# Patient Record
Sex: FEMALE | Race: WHITE | Employment: UNEMPLOYED | URBAN - METROPOLITAN AREA
[De-identification: names, ages, dates, MRNs, and addresses within clinical notes are randomized per-mention and may not be internally consistent; named-entity substitution may affect disease eponyms.]

---

## 2019-12-05 ENCOUNTER — OFFICE VISIT (OUTPATIENT)
Dept: OBGYN CLINIC | Age: 54
End: 2019-12-05
Payer: COMMERCIAL

## 2019-12-05 VITALS
BODY MASS INDEX: 27.28 KG/M2 | WEIGHT: 180 LBS | SYSTOLIC BLOOD PRESSURE: 130 MMHG | DIASTOLIC BLOOD PRESSURE: 76 MMHG | HEIGHT: 68 IN

## 2019-12-05 DIAGNOSIS — N81.89 PELVIC FLOOR WEAKNESS IN FEMALE: ICD-10-CM

## 2019-12-05 DIAGNOSIS — N95.0 PMB (POSTMENOPAUSAL BLEEDING): ICD-10-CM

## 2019-12-05 DIAGNOSIS — R68.82 DECREASED SEX DRIVE: Primary | ICD-10-CM

## 2019-12-05 DIAGNOSIS — N94.10 DYSPAREUNIA, FEMALE: ICD-10-CM

## 2019-12-05 DIAGNOSIS — N95.2 ATROPHIC VAGINITIS: ICD-10-CM

## 2019-12-05 LAB
FOLLICLE STIMULATING HORMONE: 26.6 MIU/ML
GONADOTROPIN, CHORIONIC (HCG) QUANT: <0.1 MIU/ML
LUTEINIZING HORMONE: 37.1 MIU/ML
T4 FREE: 1.19 NG/DL (ref 0.84–1.68)
TSH REFLEX: 6.5 UIU/ML (ref 0.44–3.86)

## 2019-12-05 PROCEDURE — 36415 COLL VENOUS BLD VENIPUNCTURE: CPT | Performed by: OBSTETRICS & GYNECOLOGY

## 2019-12-05 PROCEDURE — 99203 OFFICE O/P NEW LOW 30 MIN: CPT | Performed by: OBSTETRICS & GYNECOLOGY

## 2019-12-05 RX ORDER — CLOTRIMAZOLE 1 %
CREAM (GRAM) TOPICAL
COMMUNITY
Start: 2018-09-19 | End: 2020-11-02

## 2019-12-05 RX ORDER — MAGNESIUM OXIDE 400 MG/1
TABLET ORAL
Refills: 3 | COMMUNITY
Start: 2019-11-25 | End: 2021-04-28 | Stop reason: SDUPTHER

## 2019-12-05 RX ORDER — SPIRONOLACTONE 25 MG/1
25 TABLET ORAL
COMMUNITY
Start: 2019-06-21 | End: 2019-12-10 | Stop reason: ALTCHOICE

## 2019-12-05 RX ORDER — POTASSIUM CHLORIDE 750 MG/1
TABLET, FILM COATED, EXTENDED RELEASE ORAL
COMMUNITY
Start: 2019-12-02 | End: 2019-12-10 | Stop reason: ALTCHOICE

## 2019-12-05 RX ORDER — LEVOTHYROXINE SODIUM 0.03 MG/1
TABLET ORAL
COMMUNITY
Start: 2019-05-29 | End: 2019-12-10 | Stop reason: SDUPTHER

## 2019-12-05 RX ORDER — FOLIC ACID 1 MG/1
TABLET ORAL
COMMUNITY
Start: 2019-12-02 | End: 2020-08-18 | Stop reason: SDUPTHER

## 2019-12-05 RX ORDER — MONTELUKAST SODIUM 10 MG/1
TABLET ORAL
Refills: 3 | COMMUNITY
Start: 2019-11-25 | End: 2020-03-18 | Stop reason: SDUPTHER

## 2019-12-05 RX ORDER — DESLORATADINE 5 MG/1
TABLET ORAL
COMMUNITY
Start: 2019-07-24 | End: 2020-08-18 | Stop reason: SDUPTHER

## 2019-12-05 RX ORDER — ALBUTEROL SULFATE 2.5 MG/3ML
SOLUTION RESPIRATORY (INHALATION)
COMMUNITY
Start: 2014-08-12 | End: 2021-04-28 | Stop reason: SDUPTHER

## 2019-12-05 ASSESSMENT — ENCOUNTER SYMPTOMS
BLOOD IN STOOL: 0
ALLERGIC/IMMUNOLOGIC NEGATIVE: 1
NAUSEA: 0
CONSTIPATION: 0
RESPIRATORY NEGATIVE: 1
ANAL BLEEDING: 0
VOMITING: 0
ABDOMINAL PAIN: 0
DIARRHEA: 0
EYES NEGATIVE: 1
RECTAL PAIN: 0
ABDOMINAL DISTENTION: 0

## 2019-12-09 ENCOUNTER — TELEPHONE (OUTPATIENT)
Dept: OBGYN CLINIC | Age: 54
End: 2019-12-09

## 2019-12-10 ENCOUNTER — OFFICE VISIT (OUTPATIENT)
Dept: PRIMARY CARE CLINIC | Age: 54
End: 2019-12-10
Payer: COMMERCIAL

## 2019-12-10 VITALS
TEMPERATURE: 99.1 F | BODY MASS INDEX: 27.34 KG/M2 | WEIGHT: 180.4 LBS | RESPIRATION RATE: 18 BRPM | DIASTOLIC BLOOD PRESSURE: 76 MMHG | HEART RATE: 83 BPM | OXYGEN SATURATION: 98 % | HEIGHT: 68 IN | SYSTOLIC BLOOD PRESSURE: 120 MMHG

## 2019-12-10 DIAGNOSIS — E87.6 HYPOKALEMIA: ICD-10-CM

## 2019-12-10 DIAGNOSIS — Z00.00 ANNUAL PHYSICAL EXAM: Primary | ICD-10-CM

## 2019-12-10 DIAGNOSIS — Z00.00 ANNUAL PHYSICAL EXAM: ICD-10-CM

## 2019-12-10 DIAGNOSIS — E83.42 HYPOMAGNESEMIA: ICD-10-CM

## 2019-12-10 DIAGNOSIS — J45.30 MILD PERSISTENT ASTHMA WITHOUT COMPLICATION: ICD-10-CM

## 2019-12-10 DIAGNOSIS — I10 ESSENTIAL HYPERTENSION: ICD-10-CM

## 2019-12-10 DIAGNOSIS — Z00.00 HEALTH CARE MAINTENANCE: ICD-10-CM

## 2019-12-10 DIAGNOSIS — E03.9 ACQUIRED HYPOTHYROIDISM: ICD-10-CM

## 2019-12-10 PROBLEM — Z12.11 COLON CANCER SCREENING: Status: ACTIVE | Noted: 2018-08-28

## 2019-12-10 PROBLEM — K57.30 DIVERTICULOSIS OF SIGMOID COLON: Status: ACTIVE | Noted: 2018-09-11

## 2019-12-10 PROBLEM — M25.642 STIFFNESS OF FINGER JOINT, LEFT: Status: ACTIVE | Noted: 2018-09-27

## 2019-12-10 PROBLEM — S63.259A CLOSED DISLOCATION OF FINGER OF LEFT HAND: Status: ACTIVE | Noted: 2018-05-04

## 2019-12-10 LAB
ALBUMIN SERPL-MCNC: 4.6 G/DL (ref 3.5–4.6)
ALP BLD-CCNC: 147 U/L (ref 40–130)
ALT SERPL-CCNC: 17 U/L (ref 0–33)
ANION GAP SERPL CALCULATED.3IONS-SCNC: 20 MEQ/L (ref 9–15)
AST SERPL-CCNC: 41 U/L (ref 0–35)
BASOPHILS ABSOLUTE: 0.1 K/UL (ref 0–0.2)
BASOPHILS RELATIVE PERCENT: 1.3 %
BILIRUB SERPL-MCNC: 0.9 MG/DL (ref 0.2–0.7)
BUN BLDV-MCNC: 12 MG/DL (ref 6–20)
CALCIUM SERPL-MCNC: 9.5 MG/DL (ref 8.5–9.9)
CHLORIDE BLD-SCNC: 93 MEQ/L (ref 95–107)
CHOLESTEROL, TOTAL: 188 MG/DL (ref 0–199)
CO2: 20 MEQ/L (ref 20–31)
CREAT SERPL-MCNC: 0.85 MG/DL (ref 0.5–0.9)
EOSINOPHILS ABSOLUTE: 0.2 K/UL (ref 0–0.7)
EOSINOPHILS RELATIVE PERCENT: 4.5 %
GFR AFRICAN AMERICAN: >60
GFR NON-AFRICAN AMERICAN: >60
GLOBULIN: 3.4 G/DL (ref 2.3–3.5)
GLUCOSE BLD-MCNC: 67 MG/DL (ref 70–99)
HBA1C MFR BLD: 5.2 % (ref 4.8–5.9)
HCT VFR BLD CALC: 41.8 % (ref 37–47)
HDLC SERPL-MCNC: 63 MG/DL (ref 40–59)
HEMOGLOBIN: 14.1 G/DL (ref 12–16)
LDL CHOLESTEROL CALCULATED: 98 MG/DL (ref 0–129)
LYMPHOCYTES ABSOLUTE: 1.3 K/UL (ref 1–4.8)
LYMPHOCYTES RELATIVE PERCENT: 27.3 %
MAGNESIUM: 1.8 MG/DL (ref 1.7–2.4)
MCH RBC QN AUTO: 31.9 PG (ref 27–31.3)
MCHC RBC AUTO-ENTMCNC: 33.8 % (ref 33–37)
MCV RBC AUTO: 94.6 FL (ref 82–100)
MONOCYTES ABSOLUTE: 0.5 K/UL (ref 0.2–0.8)
MONOCYTES RELATIVE PERCENT: 10.3 %
NEUTROPHILS ABSOLUTE: 2.7 K/UL (ref 1.4–6.5)
NEUTROPHILS RELATIVE PERCENT: 56.6 %
PDW BLD-RTO: 15 % (ref 11.5–14.5)
PLATELET # BLD: 205 K/UL (ref 130–400)
POTASSIUM SERPL-SCNC: 4.6 MEQ/L (ref 3.4–4.9)
RBC # BLD: 4.42 M/UL (ref 4.2–5.4)
SODIUM BLD-SCNC: 133 MEQ/L (ref 135–144)
TOTAL PROTEIN: 8 G/DL (ref 6.3–8)
TRIGL SERPL-MCNC: 137 MG/DL (ref 0–150)
WBC # BLD: 4.7 K/UL (ref 4.8–10.8)

## 2019-12-10 PROCEDURE — 99396 PREV VISIT EST AGE 40-64: CPT | Performed by: FAMILY MEDICINE

## 2019-12-10 RX ORDER — FLUOXETINE 10 MG/1
CAPSULE ORAL
COMMUNITY
Start: 2019-12-09 | End: 2021-08-24

## 2019-12-10 RX ORDER — ALBUTEROL SULFATE 90 UG/1
2 AEROSOL, METERED RESPIRATORY (INHALATION) EVERY 4 HOURS PRN
Qty: 1 INHALER | Refills: 1 | Status: SHIPPED | OUTPATIENT
Start: 2019-12-10

## 2019-12-10 RX ORDER — LEVOTHYROXINE SODIUM 0.05 MG/1
50 TABLET ORAL DAILY
Qty: 30 TABLET | Refills: 1 | Status: SHIPPED | OUTPATIENT
Start: 2019-12-10 | End: 2020-01-14 | Stop reason: SDUPTHER

## 2019-12-10 SDOH — ECONOMIC STABILITY: INCOME INSECURITY: HOW HARD IS IT FOR YOU TO PAY FOR THE VERY BASICS LIKE FOOD, HOUSING, MEDICAL CARE, AND HEATING?: NOT HARD AT ALL

## 2019-12-10 SDOH — ECONOMIC STABILITY: FOOD INSECURITY: WITHIN THE PAST 12 MONTHS, THE FOOD YOU BOUGHT JUST DIDN'T LAST AND YOU DIDN'T HAVE MONEY TO GET MORE.: NEVER TRUE

## 2019-12-10 SDOH — ECONOMIC STABILITY: TRANSPORTATION INSECURITY
IN THE PAST 12 MONTHS, HAS THE LACK OF TRANSPORTATION KEPT YOU FROM MEDICAL APPOINTMENTS OR FROM GETTING MEDICATIONS?: NO

## 2019-12-10 SDOH — ECONOMIC STABILITY: TRANSPORTATION INSECURITY
IN THE PAST 12 MONTHS, HAS LACK OF TRANSPORTATION KEPT YOU FROM MEETINGS, WORK, OR FROM GETTING THINGS NEEDED FOR DAILY LIVING?: NO

## 2019-12-10 SDOH — ECONOMIC STABILITY: FOOD INSECURITY: WITHIN THE PAST 12 MONTHS, YOU WORRIED THAT YOUR FOOD WOULD RUN OUT BEFORE YOU GOT MONEY TO BUY MORE.: NEVER TRUE

## 2019-12-10 ASSESSMENT — ENCOUNTER SYMPTOMS
ABDOMINAL PAIN: 0
SHORTNESS OF BREATH: 0
NAUSEA: 0
TROUBLE SWALLOWING: 0
DIARRHEA: 0
BACK PAIN: 0
VOMITING: 0
CONSTIPATION: 0
COUGH: 0
EYE PAIN: 0
CHEST TIGHTNESS: 0

## 2019-12-10 ASSESSMENT — PATIENT HEALTH QUESTIONNAIRE - PHQ9
1. LITTLE INTEREST OR PLEASURE IN DOING THINGS: 0
2. FEELING DOWN, DEPRESSED OR HOPELESS: 0
SUM OF ALL RESPONSES TO PHQ QUESTIONS 1-9: 0
SUM OF ALL RESPONSES TO PHQ9 QUESTIONS 1 & 2: 0
SUM OF ALL RESPONSES TO PHQ QUESTIONS 1-9: 0

## 2019-12-11 ENCOUNTER — HOSPITAL ENCOUNTER (OUTPATIENT)
Dept: ULTRASOUND IMAGING | Age: 54
Discharge: HOME | End: 2019-12-13
Payer: COMMERCIAL

## 2019-12-11 DIAGNOSIS — R79.89 ELEVATED LFTS: Primary | ICD-10-CM

## 2019-12-11 DIAGNOSIS — N95.0 PMB (POSTMENOPAUSAL BLEEDING): ICD-10-CM

## 2019-12-11 PROCEDURE — 76830 TRANSVAGINAL US NON-OB: CPT

## 2019-12-11 PROCEDURE — 76856 US EXAM PELVIC COMPLETE: CPT

## 2019-12-12 ENCOUNTER — TELEPHONE (OUTPATIENT)
Dept: OBGYN CLINIC | Age: 54
End: 2019-12-12

## 2019-12-13 ENCOUNTER — OFFICE VISIT (OUTPATIENT)
Dept: OBGYN CLINIC | Age: 54
End: 2019-12-13
Payer: COMMERCIAL

## 2019-12-13 ENCOUNTER — TELEPHONE (OUTPATIENT)
Dept: OBGYN CLINIC | Age: 54
End: 2019-12-13

## 2019-12-13 VITALS
BODY MASS INDEX: 27.43 KG/M2 | SYSTOLIC BLOOD PRESSURE: 136 MMHG | WEIGHT: 181 LBS | DIASTOLIC BLOOD PRESSURE: 70 MMHG | HEIGHT: 68 IN

## 2019-12-13 DIAGNOSIS — N95.0 PMB (POSTMENOPAUSAL BLEEDING): ICD-10-CM

## 2019-12-13 DIAGNOSIS — N83.8 OVARIAN MASS: Primary | ICD-10-CM

## 2019-12-13 DIAGNOSIS — N83.8 OVARIAN MASS: ICD-10-CM

## 2019-12-13 DIAGNOSIS — N94.89 ADNEXAL MASS: ICD-10-CM

## 2019-12-13 LAB — CA 125: 14.4 U/ML (ref 0–35)

## 2019-12-13 PROCEDURE — 99213 OFFICE O/P EST LOW 20 MIN: CPT | Performed by: OBSTETRICS & GYNECOLOGY

## 2019-12-13 ASSESSMENT — ENCOUNTER SYMPTOMS
ABDOMINAL PAIN: 0
VOMITING: 0
ALLERGIC/IMMUNOLOGIC NEGATIVE: 1
BLOOD IN STOOL: 0
RESPIRATORY NEGATIVE: 1
CONSTIPATION: 0
RECTAL PAIN: 0
ABDOMINAL DISTENTION: 0
EYES NEGATIVE: 1
NAUSEA: 0
ANAL BLEEDING: 0
DIARRHEA: 0

## 2019-12-16 ENCOUNTER — TELEPHONE (OUTPATIENT)
Dept: OBGYN CLINIC | Age: 54
End: 2019-12-16

## 2019-12-16 ENCOUNTER — TELEPHONE (OUTPATIENT)
Dept: PRIMARY CARE CLINIC | Age: 54
End: 2019-12-16

## 2019-12-26 ENCOUNTER — HOSPITAL ENCOUNTER (OUTPATIENT)
Dept: WOMENS IMAGING | Age: 54
Discharge: HOME | End: 2019-12-28
Payer: COMMERCIAL

## 2019-12-26 DIAGNOSIS — Z00.00 HEALTH CARE MAINTENANCE: ICD-10-CM

## 2019-12-26 DIAGNOSIS — Z00.00 ANNUAL PHYSICAL EXAM: ICD-10-CM

## 2019-12-26 PROCEDURE — 77067 SCR MAMMO BI INCL CAD: CPT

## 2019-12-30 ENCOUNTER — TELEPHONE (OUTPATIENT)
Dept: OBGYN CLINIC | Age: 54
End: 2019-12-30

## 2019-12-30 DIAGNOSIS — N95.1 MENOPAUSAL STATE: Primary | ICD-10-CM

## 2020-01-09 PROBLEM — Z12.11 COLON CANCER SCREENING: Status: RESOLVED | Noted: 2018-08-28 | Resolved: 2020-01-09

## 2020-01-14 ENCOUNTER — OFFICE VISIT (OUTPATIENT)
Dept: PRIMARY CARE CLINIC | Age: 55
End: 2020-01-14
Payer: COMMERCIAL

## 2020-01-14 VITALS
WEIGHT: 183 LBS | RESPIRATION RATE: 18 BRPM | BODY MASS INDEX: 27.74 KG/M2 | HEART RATE: 87 BPM | SYSTOLIC BLOOD PRESSURE: 110 MMHG | DIASTOLIC BLOOD PRESSURE: 70 MMHG | HEIGHT: 68 IN | OXYGEN SATURATION: 98 % | TEMPERATURE: 98.2 F

## 2020-01-14 DIAGNOSIS — R79.89 ELEVATED LFTS: ICD-10-CM

## 2020-01-14 DIAGNOSIS — E03.9 ACQUIRED HYPOTHYROIDISM: ICD-10-CM

## 2020-01-14 DIAGNOSIS — N95.1 MENOPAUSAL STATE: ICD-10-CM

## 2020-01-14 PROBLEM — R19.00 PELVIC MASS: Status: ACTIVE | Noted: 2019-12-18

## 2020-01-14 LAB
ALBUMIN SERPL-MCNC: 4.5 G/DL (ref 3.5–4.6)
ALP BLD-CCNC: 175 U/L (ref 40–130)
ALT SERPL-CCNC: 23 U/L (ref 0–33)
ANION GAP SERPL CALCULATED.3IONS-SCNC: 13 MEQ/L (ref 9–15)
AST SERPL-CCNC: 46 U/L (ref 0–35)
BILIRUB SERPL-MCNC: 1.1 MG/DL (ref 0.2–0.7)
BUN BLDV-MCNC: 8 MG/DL (ref 6–20)
CALCIUM SERPL-MCNC: 9.7 MG/DL (ref 8.5–9.9)
CHLORIDE BLD-SCNC: 98 MEQ/L (ref 95–107)
CO2: 23 MEQ/L (ref 20–31)
CREAT SERPL-MCNC: 0.84 MG/DL (ref 0.5–0.9)
FOLLICLE STIMULATING HORMONE: 26.3 MIU/ML
GFR AFRICAN AMERICAN: >60
GFR NON-AFRICAN AMERICAN: >60
GLOBULIN: 3.5 G/DL (ref 2.3–3.5)
GLUCOSE BLD-MCNC: 105 MG/DL (ref 70–99)
LUTEINIZING HORMONE: 62.1 MIU/ML
POTASSIUM SERPL-SCNC: 5.5 MEQ/L (ref 3.4–4.9)
SODIUM BLD-SCNC: 134 MEQ/L (ref 135–144)
T4 FREE: 1.17 NG/DL (ref 0.84–1.68)
TOTAL PROTEIN: 8 G/DL (ref 6.3–8)
TSH SERPL DL<=0.05 MIU/L-ACNC: 7.07 UIU/ML (ref 0.44–3.86)

## 2020-01-14 PROCEDURE — 99213 OFFICE O/P EST LOW 20 MIN: CPT | Performed by: FAMILY MEDICINE

## 2020-01-14 RX ORDER — HYDROCHLOROTHIAZIDE 25 MG/1
25 TABLET ORAL EVERY MORNING
Qty: 90 TABLET | Refills: 3 | Status: SHIPPED | OUTPATIENT
Start: 2020-01-14 | End: 2020-12-23 | Stop reason: SDUPTHER

## 2020-01-14 RX ORDER — SPIRONOLACTONE 25 MG/1
TABLET ORAL
COMMUNITY
Start: 2019-12-14 | End: 2020-01-14

## 2020-01-14 RX ORDER — LEVOTHYROXINE SODIUM 0.07 MG/1
75 TABLET ORAL DAILY
Qty: 30 TABLET | Refills: 1 | Status: SHIPPED | OUTPATIENT
Start: 2020-01-14 | End: 2020-02-27 | Stop reason: SDUPTHER

## 2020-01-14 RX ORDER — MONTELUKAST SODIUM 10 MG/1
10 TABLET ORAL
COMMUNITY
End: 2020-01-14

## 2020-01-14 RX ORDER — LEVOTHYROXINE SODIUM 0.03 MG/1
50 TABLET ORAL
COMMUNITY
End: 2020-01-14

## 2020-01-14 RX ORDER — FLUOXETINE 10 MG/1
10 CAPSULE ORAL
COMMUNITY
Start: 2019-12-09 | End: 2020-01-14

## 2020-01-14 ASSESSMENT — PATIENT HEALTH QUESTIONNAIRE - PHQ9
1. LITTLE INTEREST OR PLEASURE IN DOING THINGS: 0
SUM OF ALL RESPONSES TO PHQ9 QUESTIONS 1 & 2: 0
2. FEELING DOWN, DEPRESSED OR HOPELESS: 0
SUM OF ALL RESPONSES TO PHQ QUESTIONS 1-9: 0
SUM OF ALL RESPONSES TO PHQ QUESTIONS 1-9: 0

## 2020-01-14 ASSESSMENT — ENCOUNTER SYMPTOMS
DIARRHEA: 0
CONSTIPATION: 0
NAUSEA: 0
CHEST TIGHTNESS: 0
SHORTNESS OF BREATH: 0
TROUBLE SWALLOWING: 0
VOMITING: 0
ABDOMINAL PAIN: 0

## 2020-01-14 NOTE — PROGRESS NOTES
Subjective:      Patient ID: Yvan Pineda is a 48 y.o. female who presents today for:  Chief Complaint   Patient presents with    Follow-up     follow for thyroid       HPI     She presents for follow-up on her blood pressure and thyroid. Patient states that she was confused and accidentally kept taking her spironolactone instead of stopping it, as we discussed at her previous visit. She states that she has been feeling a bit better since starting Synthroid. She states that she has noticed some overall retention of water in her arms and legs lately. She and her  adopted 2 baby kittens, and she is extremely excited about them. She denies any other complaints. She is due for repeat thyroid function test today.       Past Medical History:   Diagnosis Date    Allergic rhinitis     Allergies     Asthma     Closed dislocation of finger of left hand 5/4/2018    Essential hypertension 5/15/2018    Hyperthyroidism     Thyroid disease      Past Surgical History:   Procedure Laterality Date    MANDIBLE SURGERY       Social History     Socioeconomic History    Marital status:      Spouse name: Agnes Kemp Number of children: 0    Years of education: 23    Highest education level: Professional school degree (e.g., MD, DDS, DVM, VIVIENNE)   Occupational History    Occupation: retired   Social Needs    Financial resource strain: Not hard at all   Marli-Jesús insecurity:     Worry: Never true     Inability: Never true    Transportation needs:     Medical: No     Non-medical: No   Tobacco Use    Smoking status: Former Smoker    Smokeless tobacco: Never Used   Substance and Sexual Activity    Alcohol use: Yes     Comment: occassionally    Drug use: Not Currently    Sexual activity: Yes     Partners: Male     Comment:  and monogamous   Lifestyle    Physical activity:     Days per week: Not on file     Minutes per session: Not on file    Stress: Not on file   Relationships    Social connections:     Talks on phone: Not on file     Gets together: Not on file     Attends Zoroastrian service: Not on file     Active member of club or organization: Not on file     Attends meetings of clubs or organizations: Not on file     Relationship status: Not on file    Intimate partner violence:     Fear of current or ex partner: Not on file     Emotionally abused: Not on file     Physically abused: Not on file     Forced sexual activity: Not on file   Other Topics Concern    Not on file   Social History Narrative    Not on file     Family History   Problem Relation Age of Onset    Anemia Mother     Asthma Mother     Obesity Mother     Alcohol Abuse Neg Hx     Allergy (Severe) Neg Hx     Arthritis Neg Hx     Arrhythmia Neg Hx     Atrial Fibrillation Neg Hx     Birth Defects Neg Hx     Breast Cancer Neg Hx     Cancer Neg Hx     Coronary Art Dis Neg Hx     Colon Cancer Neg Hx     Depression Neg Hx     Diabetes Neg Hx     Early Death Neg Hx     Hearing Loss Neg Hx     Heart Attack Neg Hx     Heart Disease Neg Hx     High Blood Pressure Neg Hx     High Cholesterol Neg Hx     Kidney Disease Neg Hx     Learning Disabilities Neg Hx     Mental Illness Neg Hx     Mental Retardation Neg Hx     Miscarriages / Stillbirths Neg Hx     Osteoporosis Neg Hx     Prostate Cancer Neg Hx     Stroke Neg Hx     Substance Abuse Neg Hx     Vision Loss Neg Hx      Allergies   Allergen Reactions    Neomycin-Bacitracin-Polymyxin  [Bacitracin-Neomycin-Polymyxin] Rash     Current Outpatient Medications on File Prior to Visit   Medication Sig Dispense Refill    conjugated estrogens (PREMARIN) 0.625 MG/GM vaginal cream Use 0.5 g pv 2 to 3 times weekly.  1 Tube 3    FLUoxetine (PROZAC) 10 MG capsule       albuterol sulfate  (90 Base) MCG/ACT inhaler Inhale 2 puffs into the lungs every 4 hours as needed for Wheezing or Shortness of Breath 1 Inhaler 1    levothyroxine (SYNTHROID) 50 MCG tablet Take 1 tablet by mouth Daily 30 tablet 1    montelukast (SINGULAIR) 10 MG tablet TAKE 1 TABLET BY MOUTH DAILY AT BEDTIME. FOR ASTHMA AND ALLERGIES  3    folic acid (FOLVITE) 1 MG tablet       magnesium oxide (MAG-OX) 400 MG tablet TAKE 1 TABLET BY MOUTH EVERY DAY  3    albuterol (PROVENTIL) (2.5 MG/3ML) 0.083% nebulizer solution Inhale by nebulizer over 5-15 minutes three (3) to four (4) times a day as needed for cough/wheezing/shortness of breath      desloratadine (CLARINEX) 5 MG tablet TAKE 1 TABLET BY MOUTH EVERY DAY      conjugated estrogens (PREMARIN) 0.625 MG/GM vaginal cream Use 0.5 g pv 2 to 3 times weekly.  clotrimazole (LOTRIMIN) 1 % cream Apply topically       No current facility-administered medications on file prior to visit. Review of Systems   Constitutional: Positive for fatigue. Negative for chills, fever and unexpected weight change. HENT: Negative for tinnitus and trouble swallowing. Eyes: Negative for visual disturbance. Respiratory: Negative for chest tightness and shortness of breath. Cardiovascular: Negative for chest pain. Gastrointestinal: Negative for abdominal pain, constipation, diarrhea, nausea and vomiting. Skin: Negative for rash. Neurological: Negative for weakness and headaches. Psychiatric/Behavioral: Negative for suicidal ideas. Objective:   /70 (Site: Left Upper Arm, Position: Sitting, Cuff Size: Medium Adult)   Pulse 87   Temp 98.2 °F (36.8 °C) (Oral)   Resp 18   Ht 5' 8\" (1.727 m)   Wt 183 lb (83 kg)   SpO2 98%   BMI 27.83 kg/m²     Physical Exam  Vitals signs and nursing note reviewed. Constitutional:       Appearance: Normal appearance. She is well-developed. HENT:      Head: Normocephalic and atraumatic. Eyes:      Pupils: Pupils are equal, round, and reactive to light. Neck:      Musculoskeletal: Normal range of motion and neck supple. Cardiovascular:      Rate and Rhythm: Normal rate and regular rhythm. Heart sounds: Normal heart sounds. Pulmonary:      Effort: Pulmonary effort is normal.      Breath sounds: Normal breath sounds. Abdominal:      General: Bowel sounds are normal.      Palpations: Abdomen is soft. Musculoskeletal:      Right lower leg: Edema (Trace) present. Left lower leg: Edema (Trace) present. Skin:     General: Skin is warm and dry. Neurological:      General: No focal deficit present. Mental Status: She is alert and oriented to person, place, and time. Psychiatric:         Mood and Affect: Mood normal.         Behavior: Behavior normal.         Thought Content: Thought content normal.         Judgment: Judgment normal.         No results found for this visit on 01/14/20. Assessment:       Diagnosis Orders   1. Essential hypertension  hydrochlorothiazide (HYDRODIURIL) 25 MG tablet   2. Acquired hypothyroidism  TSH without Reflex    T4, Free         Plan:      Orders Placed This Encounter   Procedures    TSH without Reflex     Standing Status:   Future     Standing Expiration Date:   1/13/2021    T4, Free     Standing Status:   Future     Standing Expiration Date:   1/14/2021     Orders Placed This Encounter   Medications    hydrochlorothiazide (HYDRODIURIL) 25 MG tablet     Sig: Take 1 tablet by mouth every morning     Dispense:  90 tablet     Refill:  3     Spironolactone DC'd. HCTZ started. We will recheck cmp and thyroid function test today. Return in about 10 months (around 11/14/2020) for annual physical.    UpToDate was referenced for current practice guidelines and the current standard of care pertaining specifically to this patient. Please note this report has been partially produced using speech recognition software and may cause contain errors related to that system including grammar, punctuation and spelling as well as words and phrases that may seem inappropriate.  If there are questions or concerns please feel free to contact me to clarify.     Mere Swartz, DO

## 2020-02-03 RX ORDER — LEVOTHYROXINE SODIUM 0.05 MG/1
TABLET ORAL
Qty: 30 TABLET | Refills: 1 | OUTPATIENT
Start: 2020-02-03

## 2020-02-03 NOTE — TELEPHONE ENCOUNTER
Pharmacy  requesting medication refill.  Please approve or deny this request.    Rx requested:  Requested Prescriptions     Pending Prescriptions Disp Refills    levothyroxine (SYNTHROID) 50 MCG tablet [Pharmacy Med Name: LEVOTHYROXINE 50 MCG TABLET] 30 tablet 1     Sig: TAKE 1 TABLET BY MOUTH EVERY DAY         Last Office Visit:   1/14/2020      Next Visit Date:  Future Appointments   Date Time Provider Faith Alejandre   11/17/2020 11:00 AM Mere Klocwork, DO Faith Alejandre

## 2020-02-25 ENCOUNTER — TELEPHONE (OUTPATIENT)
Dept: PRIMARY CARE CLINIC | Age: 55
End: 2020-02-25

## 2020-02-26 ENCOUNTER — OFFICE VISIT (OUTPATIENT)
Dept: PRIMARY CARE CLINIC | Age: 55
End: 2020-02-26
Payer: COMMERCIAL

## 2020-02-26 VITALS
RESPIRATION RATE: 18 BRPM | DIASTOLIC BLOOD PRESSURE: 74 MMHG | HEART RATE: 93 BPM | BODY MASS INDEX: 28.13 KG/M2 | SYSTOLIC BLOOD PRESSURE: 114 MMHG | TEMPERATURE: 98.2 F | OXYGEN SATURATION: 98 % | WEIGHT: 185.6 LBS | HEIGHT: 68 IN

## 2020-02-26 DIAGNOSIS — E03.9 ACQUIRED HYPOTHYROIDISM: ICD-10-CM

## 2020-02-26 LAB
T4 FREE: 1.61 NG/DL (ref 0.84–1.68)
TSH SERPL DL<=0.05 MIU/L-ACNC: 2.91 UIU/ML (ref 0.44–3.86)

## 2020-02-26 PROCEDURE — 99213 OFFICE O/P EST LOW 20 MIN: CPT | Performed by: FAMILY MEDICINE

## 2020-02-26 ASSESSMENT — ENCOUNTER SYMPTOMS
TROUBLE SWALLOWING: 0
SHORTNESS OF BREATH: 0
CHEST TIGHTNESS: 0
NAUSEA: 0
CONSTIPATION: 0
VOMITING: 0
ABDOMINAL PAIN: 0
DIARRHEA: 0

## 2020-02-26 NOTE — PROGRESS NOTES
sulfate  (90 Base) MCG/ACT inhaler Inhale 2 puffs into the lungs every 4 hours as needed for Wheezing or Shortness of Breath 1 Inhaler 1    montelukast (SINGULAIR) 10 MG tablet TAKE 1 TABLET BY MOUTH DAILY AT BEDTIME. FOR ASTHMA AND ALLERGIES  3    folic acid (FOLVITE) 1 MG tablet       magnesium oxide (MAG-OX) 400 MG tablet TAKE 1 TABLET BY MOUTH EVERY DAY  3    albuterol (PROVENTIL) (2.5 MG/3ML) 0.083% nebulizer solution Inhale by nebulizer over 5-15 minutes three (3) to four (4) times a day as needed for cough/wheezing/shortness of breath      desloratadine (CLARINEX) 5 MG tablet TAKE 1 TABLET BY MOUTH EVERY DAY      clotrimazole (LOTRIMIN) 1 % cream Apply topically       No current facility-administered medications on file prior to visit. Review of Systems   Constitutional: Negative for chills, fever and unexpected weight change. HENT: Negative for tinnitus and trouble swallowing. Eyes: Negative for visual disturbance. Respiratory: Negative for chest tightness and shortness of breath. Cardiovascular: Negative for chest pain. Gastrointestinal: Negative for abdominal pain, constipation, diarrhea, nausea and vomiting. Genitourinary: Positive for vaginal bleeding. Negative for decreased urine volume, difficulty urinating, dyspareunia, dysuria, flank pain, frequency, hematuria, pelvic pain, urgency, vaginal discharge and vaginal pain. Skin: Negative for rash. Neurological: Negative for weakness and headaches. Psychiatric/Behavioral: Negative for suicidal ideas. Objective:   /74 (Site: Right Upper Arm, Position: Sitting, Cuff Size: Large Adult)   Pulse 93   Temp 98.2 °F (36.8 °C) (Oral)   Resp 18   Ht 5' 8\" (1.727 m)   Wt 185 lb 9.6 oz (84.2 kg)   SpO2 98%   BMI 28.22 kg/m²     Physical Exam  Vitals signs and nursing note reviewed. Constitutional:       Appearance: Normal appearance. She is well-developed. HENT:      Head: Normocephalic and atraumatic.

## 2020-02-27 RX ORDER — LEVOTHYROXINE SODIUM 0.07 MG/1
75 TABLET ORAL DAILY
Qty: 90 TABLET | Refills: 2 | Status: SHIPPED | OUTPATIENT
Start: 2020-02-27 | End: 2020-11-22 | Stop reason: SDUPTHER

## 2020-03-18 RX ORDER — MONTELUKAST SODIUM 10 MG/1
10 TABLET ORAL NIGHTLY
Qty: 90 TABLET | Refills: 3 | Status: SHIPPED | OUTPATIENT
Start: 2020-03-18 | End: 2021-04-28 | Stop reason: SDUPTHER

## 2020-06-15 ENCOUNTER — TELEPHONE (OUTPATIENT)
Dept: FAMILY MEDICINE CLINIC | Age: 55
End: 2020-06-15

## 2020-06-15 NOTE — TELEPHONE ENCOUNTER
Called patient to scheduled a colonoscopy. Patient stated that she has already had one and it was done with [facility]. Patient stated that will call back to get the fax number to fax the records over.

## 2020-07-27 ENCOUNTER — VIRTUAL VISIT (OUTPATIENT)
Dept: PRIMARY CARE CLINIC | Age: 55
End: 2020-07-27
Payer: COMMERCIAL

## 2020-07-27 PROCEDURE — 99441 PR PHYS/QHP TELEPHONE EVALUATION 5-10 MIN: CPT | Performed by: FAMILY MEDICINE

## 2020-07-27 ASSESSMENT — ENCOUNTER SYMPTOMS
ABDOMINAL PAIN: 0
DIARRHEA: 0
CONSTIPATION: 0
TROUBLE SWALLOWING: 0
VOMITING: 0
SHORTNESS OF BREATH: 0
CHEST TIGHTNESS: 0
NAUSEA: 0

## 2020-07-27 NOTE — PROGRESS NOTES
2020    TELEHEALTH EVALUATION -- Audio/Visual (During MQVDF-48 public health emergency)    Due to [place] 19 outbreak, patient's office visit was converted to a virtual visit. Patient was contacted and agreed to proceed with a virtual visit via Telephone Visit  The risks and benefits of converting to a virtual visit were discussed in light of the current infectious disease epidemic. Patient also understood that insurance coverage and co-pays are up to their individual insurance plans. HPI:    Mimi Duarte (:  1965) has requested an audio evaluation for the following concern(s):    Postop vaginal spotting. Patient underwent D&C at [facility] on 2020. She is having light vaginal spotting that necessitates 1 panty liner per day. She is about 4 weeks postop and is wondering when this will completely resolve. She has reached out to her surgeon, who has reassured the patient that this is normal.      Review of Systems   Constitutional: Negative for chills, fever and unexpected weight change. HENT: Negative for tinnitus and trouble swallowing. Eyes: Negative for visual disturbance. Respiratory: Negative for chest tightness and shortness of breath. Cardiovascular: Negative for chest pain. Gastrointestinal: Negative for abdominal pain, constipation, diarrhea, nausea and vomiting. Genitourinary: Positive for vaginal bleeding. Negative for difficulty urinating, dysuria, frequency, hematuria, pelvic pain, urgency, vaginal discharge and vaginal pain. Skin: Negative for rash. Neurological: Negative for weakness and headaches. Psychiatric/Behavioral: Negative for suicidal ideas. Prior to Visit Medications    Medication Sig Taking?  Authorizing Provider   montelukast (SINGULAIR) 10 MG tablet Take 1 tablet by mouth nightly  Mere Servetas, DO   levothyroxine (SYNTHROID) 75 MCG tablet Take 1 tablet by mouth Daily  Mere Servetas, DO   WIXELA INHUB 100-50 MCG/DOSE 7/27/2020 at 1:21 PM        Pursuant to the emergency declaration under the [facility] waiver authority and the [employer] Act, this Virtual  Visit was conducted, with patient's consent, to reduce the patient's risk of exposure to COVID-19 and provide continuity of care for an established patient. Services were provided through a telephone synchronous discussion virtually to substitute for in-person clinic visit.

## 2020-07-29 ENCOUNTER — TELEPHONE (OUTPATIENT)
Dept: FAMILY MEDICINE CLINIC | Age: 55
End: 2020-07-29

## 2020-07-29 NOTE — TELEPHONE ENCOUNTER
Pt called on the list for  colorectal screening. States that this was done 2 yrs ago at the [facility] not due for 3 more yrs .

## 2020-08-18 RX ORDER — DESLORATADINE 5 MG/1
5 TABLET ORAL DAILY
Qty: 90 TABLET | Refills: 3 | Status: SHIPPED | OUTPATIENT
Start: 2020-08-18 | End: 2021-04-28 | Stop reason: SDUPTHER

## 2020-08-18 RX ORDER — FOLIC ACID 1 MG/1
1 TABLET ORAL DAILY
Qty: 90 TABLET | Refills: 3 | Status: SHIPPED | OUTPATIENT
Start: 2020-08-18 | End: 2021-04-28 | Stop reason: SDUPTHER

## 2020-10-06 ENCOUNTER — PROCEDURE VISIT (OUTPATIENT)
Dept: OBGYN CLINIC | Age: 55
End: 2020-10-06
Payer: COMMERCIAL

## 2020-10-06 VITALS — DIASTOLIC BLOOD PRESSURE: 68 MMHG | WEIGHT: 185 LBS | SYSTOLIC BLOOD PRESSURE: 132 MMHG | BODY MASS INDEX: 28.13 KG/M2

## 2020-10-06 PROCEDURE — 99213 OFFICE O/P EST LOW 20 MIN: CPT | Performed by: OBSTETRICS & GYNECOLOGY

## 2020-10-06 ASSESSMENT — ENCOUNTER SYMPTOMS
DIARRHEA: 0
ALLERGIC/IMMUNOLOGIC NEGATIVE: 1
ANAL BLEEDING: 0
EYES NEGATIVE: 1
NAUSEA: 0
RECTAL PAIN: 0
CONSTIPATION: 0
BLOOD IN STOOL: 0
ABDOMINAL PAIN: 0
ABDOMINAL DISTENTION: 0
RESPIRATORY NEGATIVE: 1
VOMITING: 0

## 2020-10-06 NOTE — PROGRESS NOTES
Patient here to discuss IUD removal.  Patient states she started having \" PMB \"  After no cycle x 1 year. Started spotting daily x 6 weeks. Saw Dr Holli Kaufman [facility] ) and had EMB; negative. Mirena IUD placed to control spotting ( 6/25/2020 ). Bleeding resolved with Mirena. States she has also noticed mood irritability since IUD placed. Occasional NS as well. Discussed above and that menopausal sx more likely related to perimenopause and not IUD. Discussed natural supplements for sx including  Soy, Flaxseed, Black Cohash, Estroven or Vitamin E. Also discussed other meds if necessary including low dose anti-depressants with risks and benefits. All questions answered. After discussion, patient opted to keep IUD in place for now. F/U as directed. Vitals:  /68   Wt 185 lb (83.9 kg)   LMP  (LMP Unknown)   BMI 28.13 kg/m²   Past Medical History:   Diagnosis Date    Allergic rhinitis     Allergies     Asthma     Closed dislocation of finger of left hand 5/4/2018    Essential hypertension 5/15/2018    Hyperthyroidism     Thyroid disease      Past Surgical History:   Procedure Laterality Date    MANDIBLE SURGERY       Allergies:  Neomycin-bacitracin-polymyxin  [bacitracin-neomycin-polymyxin]  Current Outpatient Medications   Medication Sig Dispense Refill    desloratadine (CLARINEX) 5 MG tablet Take 1 tablet by mouth daily 90 tablet 3    folic acid (FOLVITE) 1 MG tablet Take 1 tablet by mouth daily 90 tablet 3    montelukast (SINGULAIR) 10 MG tablet Take 1 tablet by mouth nightly 90 tablet 3    levothyroxine (SYNTHROID) 75 MCG tablet Take 1 tablet by mouth Daily 90 tablet 2    WIXELA INHUB 100-50 MCG/DOSE diskus inhaler INHALE 1 PUFF AS INSTRUCTED TWICE DAILY. RINSE AND GARGLE MOUTH WITH WATER AFTER EACH USE      conjugated estrogens (PREMARIN) 0.625 MG/GM vaginal cream Use 0.5 g pv 2 to 3 times weekly.  1 Tube 3    conjugated estrogens (PREMARIN) 0.625 MG/GM vaginal cream Use 0.5 g pv 2 to 3 times weekly.  hydrochlorothiazide (HYDRODIURIL) 25 MG tablet Take 1 tablet by mouth every morning 90 tablet 3    FLUoxetine (PROZAC) 10 MG capsule       albuterol sulfate  (90 Base) MCG/ACT inhaler Inhale 2 puffs into the lungs every 4 hours as needed for Wheezing or Shortness of Breath 1 Inhaler 1    magnesium oxide (MAG-OX) 400 MG tablet TAKE 1 TABLET BY MOUTH EVERY DAY  3    albuterol (PROVENTIL) (2.5 MG/3ML) 0.083% nebulizer solution Inhale by nebulizer over 5-15 minutes three (3) to four (4) times a day as needed for cough/wheezing/shortness of breath      clotrimazole (LOTRIMIN) 1 % cream Apply topically       No current facility-administered medications for this visit.       Social History     Socioeconomic History    Marital status:      Spouse name: Waldemar Ibarra Number of children: 0    Years of education: 23    Highest education level: Professional school degree (e.g., MD, DDS, DVM, VIVIENNE)   Occupational History    Occupation: retired   Social Needs    Financial resource strain: Not hard at all   DrivenBI insecurity     Worry: Never true     Inability: Never true   Jumptap needs     Medical: No     Non-medical: No   Tobacco Use    Smoking status: Former Smoker    Smokeless tobacco: Never Used   Substance and Sexual Activity    Alcohol use: Yes     Comment: occassionally    Drug use: Not Currently    Sexual activity: Yes     Partners: Male     Comment:  and monogamous   Lifestyle    Physical activity     Days per week: Not on file     Minutes per session: Not on file    Stress: Not on file   Relationships    Social connections     Talks on phone: Not on file     Gets together: Not on file     Attends Hinduism service: Not on file     Active member of club or organization: Not on file     Attends meetings of clubs or organizations: Not on file     Relationship status: Not on file    Intimate partner violence     Fear of current or ex partner: Not on file     Emotionally abused: Not on file     Physically abused: Not on file     Forced sexual activity: Not on file   Other Topics Concern    Not on file   Social History Narrative    Not on file     Family History   Problem Relation Age of Onset    Anemia Mother     Asthma Mother     Obesity Mother     Alcohol Abuse Neg Hx     Allergy (Severe) Neg Hx     Arthritis Neg Hx     Arrhythmia Neg Hx     Atrial Fibrillation Neg Hx     Birth Defects Neg Hx     Breast Cancer Neg Hx     Cancer Neg Hx     Coronary Art Dis Neg Hx     Colon Cancer Neg Hx     Depression Neg Hx     Diabetes Neg Hx     Early Death Neg Hx     Hearing Loss Neg Hx     Heart Attack Neg Hx     Heart Disease Neg Hx     High Blood Pressure Neg Hx     High Cholesterol Neg Hx     Kidney Disease Neg Hx     Learning Disabilities Neg Hx     Mental Illness Neg Hx     Mental Retardation Neg Hx     Miscarriages / Stillbirths Neg Hx     Osteoporosis Neg Hx     Prostate Cancer Neg Hx     Stroke Neg Hx     Substance Abuse Neg Hx     Vision Loss Neg Hx        Review of Systems   Constitutional: Negative. Negative for activity change, appetite change, chills, diaphoresis, fatigue, fever and unexpected weight change. HENT: Negative. Eyes: Negative. Respiratory: Negative. Cardiovascular: Negative. Gastrointestinal: Negative for abdominal distention, abdominal pain, anal bleeding, blood in stool, constipation, diarrhea, nausea, rectal pain and vomiting. Endocrine: Negative. Genitourinary: Negative for decreased urine volume, difficulty urinating, dyspareunia, dysuria, enuresis, flank pain, frequency, genital sores, hematuria, menstrual problem, pelvic pain, urgency, vaginal bleeding, vaginal discharge and vaginal pain. Musculoskeletal: Negative. Skin: Negative. Allergic/Immunologic: Negative. Neurological: Negative. Hematological: Negative. Psychiatric/Behavioral: Positive for dysphoric mood.        [facility] Physical Exam  Constitutional:       General: She is not in acute distress. Appearance: She is well-developed. She is not diaphoretic. HENT:      Head: Normocephalic and atraumatic. Eyes:      Conjunctiva/sclera: Conjunctivae normal.   Neck:      Musculoskeletal: Normal range of motion and neck supple. Cardiovascular:      Rate and Rhythm: Normal rate and regular rhythm. Pulmonary:      Effort: Pulmonary effort is normal. No respiratory distress. Genitourinary:     Vagina: Normal.   Musculoskeletal: Normal range of motion. General: No tenderness or deformity. Skin:     General: Skin is warm and dry. Coloration: Skin is not pale. Neurological:      Mental Status: She is alert and oriented to person, place, and time. Motor: No abnormal muscle tone. Coordination: Coordination normal.   Psychiatric:         Behavior: Behavior normal.         Thought Content: Thought content normal.         Judgment: Judgment normal.         Assessment:      Diagnosis Orders   1. Postmenopausal symptoms           :      Medications placedthis encounter:  No orders of the defined types were placed in this encounter. Orders placedthis encounter:  No orders of the defined types were placed in this encounter.         Follow up:  Return for Annual.

## 2020-11-02 ENCOUNTER — OFFICE VISIT (OUTPATIENT)
Dept: OBGYN CLINIC | Age: 55
End: 2020-11-02
Payer: COMMERCIAL

## 2020-11-02 VITALS — BODY MASS INDEX: 28.28 KG/M2 | DIASTOLIC BLOOD PRESSURE: 82 MMHG | SYSTOLIC BLOOD PRESSURE: 124 MMHG | WEIGHT: 186 LBS

## 2020-11-02 PROCEDURE — 99396 PREV VISIT EST AGE 40-64: CPT | Performed by: OBSTETRICS & GYNECOLOGY

## 2020-11-02 ASSESSMENT — ENCOUNTER SYMPTOMS
BLOOD IN STOOL: 0
CONSTIPATION: 0
ABDOMINAL DISTENTION: 0
VOMITING: 0
RESPIRATORY NEGATIVE: 1
ABDOMINAL PAIN: 0
RECTAL PAIN: 0
DIARRHEA: 0
ANAL BLEEDING: 0
ALLERGIC/IMMUNOLOGIC NEGATIVE: 1
EYES NEGATIVE: 1
NAUSEA: 0

## 2020-11-02 ASSESSMENT — VISUAL ACUITY: OU: 1

## 2020-11-02 NOTE — PROGRESS NOTES
Subjective:      Patient ID: Ellen Heredia. Franki Overton is a 50 y.o. female    Annual exam.  [place] IUD in place ( see previous note ). States that still having some spotting q 2 weeks. May be cycles, but will order US to confirm IUD placement appropriate. No GYN complaints. Pap performed and screening mammogram ordered. Monthly SBE encouraged. Screening colonoscopy recommended per routine. F/U annual exam or prn. Vitals:  /82   Wt 186 lb (84.4 kg)   LMP  (LMP Unknown)   BMI 28.28 kg/m²   Past Medical History:   Diagnosis Date    Allergic rhinitis     Allergies     Asthma     Closed dislocation of finger of left hand 5/4/2018    Essential hypertension 5/15/2018    Hyperthyroidism     Thyroid disease      Past Surgical History:   Procedure Laterality Date    MANDIBLE SURGERY       Allergies:  Neomycin-bacitracin-polymyxin  [bacitracin-neomycin-polymyxin]  Current Outpatient Medications   Medication Sig Dispense Refill    desloratadine (CLARINEX) 5 MG tablet Take 1 tablet by mouth daily 90 tablet 3    folic acid (FOLVITE) 1 MG tablet Take 1 tablet by mouth daily 90 tablet 3    montelukast (SINGULAIR) 10 MG tablet Take 1 tablet by mouth nightly 90 tablet 3    levothyroxine (SYNTHROID) 75 MCG tablet Take 1 tablet by mouth Daily 90 tablet 2    WIXELA INHUB 100-50 MCG/DOSE diskus inhaler INHALE 1 PUFF AS INSTRUCTED TWICE DAILY.  RINSE AND GARGLE MOUTH WITH WATER AFTER EACH USE      hydrochlorothiazide (HYDRODIURIL) 25 MG tablet Take 1 tablet by mouth every morning 90 tablet 3    FLUoxetine (PROZAC) 10 MG capsule       albuterol sulfate  (90 Base) MCG/ACT inhaler Inhale 2 puffs into the lungs every 4 hours as needed for Wheezing or Shortness of Breath 1 Inhaler 1    magnesium oxide (MAG-OX) 400 MG tablet TAKE 1 TABLET BY MOUTH EVERY DAY  3    albuterol (PROVENTIL) (2.5 MG/3ML) 0.083% nebulizer solution Inhale by nebulizer over 5-15 minutes three (3) to four (4) times a day as needed for cough/wheezing/shortness of breath      conjugated estrogens (PREMARIN) 0.625 MG/GM vaginal cream Use 0.5 g pv 2 to 3 times weekly. (Patient not taking: Reported on 11/2/2020) 1 Tube 3     No current facility-administered medications for this visit.       Social History     Socioeconomic History    Marital status:      Spouse name: Hezzie Koyanagi Number of children: 0    Years of education: 23    Highest education level: Professional school degree (e.g., MD, DDS, DVM, VIVIENNE)   Occupational History    Occupation: retired   Social Needs    Financial resource strain: Not hard at all   Tellyo insecurity     Worry: Never true     Inability: Never true   [employer] needs     Medical: No     Non-medical: No   Tobacco Use    Smoking status: Former Smoker    Smokeless tobacco: Never Used   Substance and Sexual Activity    Alcohol use: Yes     Comment: occassionally    Drug use: Not Currently    Sexual activity: Yes     Partners: Male     Comment:  and monogamous   Lifestyle    Physical activity     Days per week: Not on file     Minutes per session: Not on file    Stress: Not on file   Relationships    Social connections     Talks on phone: Not on file     Gets together: Not on file     Attends Taoism service: Not on file     Active member of club or organization: Not on file     Attends meetings of clubs or organizations: Not on file     Relationship status: Not on file    Intimate partner violence     Fear of current or ex partner: Not on file     Emotionally abused: Not on file     Physically abused: Not on file     Forced sexual activity: Not on file   Other Topics Concern    Not on file   Social History Narrative    Not on file     Family History   Problem Relation Age of Onset    Anemia Mother     Asthma Mother     Obesity Mother     Alcohol Abuse Neg Hx     Allergy (Severe) Neg Hx     Arthritis Neg Hx     Arrhythmia Neg Hx     Atrial Fibrillation Neg Hx     Birth Defects Neg Hx  Breast Cancer Neg Hx     Cancer Neg Hx     Coronary Art Dis Neg Hx     Colon Cancer Neg Hx     Depression Neg Hx     Diabetes Neg Hx     Early Death Neg Hx     Hearing Loss Neg Hx     Heart Attack Neg Hx     Heart Disease Neg Hx     High Blood Pressure Neg Hx     High Cholesterol Neg Hx     Kidney Disease Neg Hx     Learning Disabilities Neg Hx     Mental Illness Neg Hx     Mental Retardation Neg Hx     Miscarriages / Stillbirths Neg Hx     Osteoporosis Neg Hx     Prostate Cancer Neg Hx     Stroke Neg Hx     Substance Abuse Neg Hx     Vision Loss Neg Hx        Review of Systems   Constitutional: Negative. Negative for activity change, appetite change, chills, diaphoresis, fatigue, fever and unexpected weight change. HENT: Negative. Eyes: Negative. Respiratory: Negative. Cardiovascular: Negative. Gastrointestinal: Negative for abdominal distention, abdominal pain, anal bleeding, blood in stool, constipation, diarrhea, nausea, rectal pain and vomiting. Endocrine: Negative. Genitourinary: Negative for decreased urine volume, difficulty urinating, dyspareunia, dysuria, enuresis, flank pain, frequency, genital sores, hematuria, menstrual problem, pelvic pain, urgency, vaginal bleeding, vaginal discharge and vaginal pain. Musculoskeletal: Negative. Skin: Negative. Allergic/Immunologic: Negative. Neurological: Negative. Hematological: Negative. Psychiatric/Behavioral: Negative. Objective:     Physical Exam  Constitutional:       Appearance: She is well-developed. HENT:      Head: Normocephalic. Eyes:      General: Lids are normal. Vision grossly intact. Neck:      Musculoskeletal: Normal range of motion and neck supple. Thyroid: No thyromegaly. Cardiovascular:      Rate and Rhythm: Normal rate and regular rhythm. Heart sounds: Normal heart sounds. Pulmonary:      Effort: Pulmonary effort is normal. No respiratory distress.       Breath sounds: Normal breath sounds. No wheezing or rales. Chest:      Chest wall: No tenderness. Breasts:         Right: Normal. No swelling, bleeding, inverted nipple, mass, nipple discharge, skin change or tenderness. Left: Normal. No swelling, bleeding, inverted nipple, mass, nipple discharge, skin change or tenderness. Abdominal:      General: There is no distension. Palpations: Abdomen is soft. There is no mass. Tenderness: There is no abdominal tenderness. There is no guarding or rebound. Hernia: No hernia is present. There is no hernia in the left inguinal area or right inguinal area. Genitourinary:     General: Normal vulva. Pubic Area: No rash. Labia:         Right: No rash, tenderness, lesion or injury. Left: No rash, tenderness, lesion or injury. Urethra: No prolapse, urethral swelling or urethral lesion. Vagina: Normal. No signs of injury and foreign body. No vaginal discharge, erythema, tenderness or bleeding. Cervix: No cervical motion tenderness, discharge or friability. Uterus: Not deviated, not enlarged, not fixed and not tender. Adnexa:         Right: No mass, tenderness or fullness. Left: No mass, tenderness or fullness. Rectum: Normal.   Musculoskeletal: Normal range of motion. General: No tenderness. Lymphadenopathy:      Cervical: No cervical adenopathy. Upper Body:      Right upper body: No supraclavicular or axillary adenopathy. Left upper body: No supraclavicular or axillary adenopathy. Lower Body: No right inguinal adenopathy. No left inguinal adenopathy. Skin:     General: Skin is warm and dry. Coloration: Skin is not pale. Findings: No erythema or rash. Neurological:      Mental Status: She is alert and oriented to person, place, and time. Psychiatric:         Behavior: Behavior normal.         Thought Content:  Thought content normal.         Judgment: Judgment normal.         Assessment:       Diagnosis Orders   1. Pap smear, as part of routine gynecological examination  PAP SMEAR   2. Screening for human papillomavirus  PAP SMEAR   3. Screening mammogram, encounter for  BIN DIGITAL SCREEN W OR WO CAD BILATERAL   4. Spotting          Plan:      Medications placedthis encounter:  No orders of the defined types were placed in this encounter. Orders placedthis encounter:  Orders Placed This Encounter   Procedures    BIN DIGITAL SCREEN W OR WO CAD BILATERAL     Standing Status:   Future     Standing Expiration Date:   11/2/2021    PAP SMEAR     Patient History:    No LMP recorded (lmp unknown). Patient is postmenopausal.  OBGYN Status: Postmenopausal  Past Surgical History:  No date: MANDIBLE SURGERY      Social History    Tobacco Use      Smoking status: Former Smoker      Smokeless tobacco: Never Used       Standing Status:   Future     Standing Expiration Date:   11/2/2021     Order Specific Question:   Collection Type     Answer: Thin Prep     Order Specific Question:   Prior Abnormal Pap Test     Answer:   No     Order Specific Question:   Screening or Diagnostic     Answer:   Screening     Order Specific Question:   HPV Requested? Answer:   Yes     Comments:   16/18     Order Specific Question:   High Risk Patient     Answer:   N/A         Follow up:  Return in about 1 year (around 11/2/2021) for Annual, Ultrasound, Results Review. Repeat Annual GYN exam every 1 year. Cervical Cytology exam starts age 24. If Negative Cytology;  follow-up screening per current guidelines. Mammograms yearly starting at age 36. Calcium and Vitamin D dosing reviewed ( age appropriate ). Colonoscopy and bone density screening discussed ( age appropriate ). Birth control and STD prevention discussed ( age appropriate ). Gardisil counseling completed for all patients 7-35 yo. Routine health maintenance ( per PCP and guidelines ).

## 2020-11-04 ENCOUNTER — HOSPITAL ENCOUNTER (OUTPATIENT)
Dept: ULTRASOUND IMAGING | Age: 55
Discharge: HOME | End: 2020-11-06
Payer: COMMERCIAL

## 2020-11-04 PROCEDURE — 76830 TRANSVAGINAL US NON-OB: CPT

## 2020-11-04 PROCEDURE — 76856 US EXAM PELVIC COMPLETE: CPT

## 2020-11-05 ENCOUNTER — OFFICE VISIT (OUTPATIENT)
Dept: OBGYN CLINIC | Age: 55
End: 2020-11-05
Payer: COMMERCIAL

## 2020-11-05 VITALS
SYSTOLIC BLOOD PRESSURE: 142 MMHG | WEIGHT: 186 LBS | DIASTOLIC BLOOD PRESSURE: 76 MMHG | BODY MASS INDEX: 28.19 KG/M2 | HEIGHT: 68 IN

## 2020-11-05 PROCEDURE — 99214 OFFICE O/P EST MOD 30 MIN: CPT | Performed by: OBSTETRICS & GYNECOLOGY

## 2020-11-05 ASSESSMENT — ENCOUNTER SYMPTOMS
DIARRHEA: 0
ALLERGIC/IMMUNOLOGIC NEGATIVE: 1
ANAL BLEEDING: 0
RESPIRATORY NEGATIVE: 1
RECTAL PAIN: 0
VOMITING: 0
CONSTIPATION: 0
ABDOMINAL PAIN: 0
EYES NEGATIVE: 1
BLOOD IN STOOL: 0
NAUSEA: 0
ABDOMINAL DISTENTION: 0

## 2020-11-05 NOTE — PROGRESS NOTES
Patient here for US results ( see previous note ). H/O right dermoid since last year. Saw Dr Angel Reyes ( GYN/ONC ) after finding of 11 cm solid/cystic right adnexal mass last year on exam.  Rads report did not separate size of ovary from cystic component on that [address].  negative. Dr Angel Reyes did a CT and counseled patient that this was statistically a benign dermoid and recommended conservative observation based on co-morbidities for surgery. Patient had repeat US 6/2020 and ovarian size smaller with cystic component being 5 cm. Also had IUD placed at that time for spotting after EMB negative. US just performed:         EXAMINATION: US NON OB TRANSVAGINAL, US PELVIS COMPLETE         CLINICAL HISTORY: SPOTTING, HISTORY OF CYSTIC OVARIAN LESION. Z30.431 IUD check up ICD10         COMPARISONS: PELVIC ULTRASOUND FROM DECEMBER 11, 2019         TECHNIQUE: Transabdominal and endovaginal multiplanar grayscale and Doppler ultrasound images of the pelvis were obtained.         FINDINGS:      The uterus measures 7.1 x 3.2 x 3.8 cm. The endometrial stripe measures 3 mm and there is an endometrial IUD device in place.         There is no free fluid.         The right ovary measures 8.3 x 5.7 x 5.6 cm for an estimated volume of 140 mL. There is a large cyst arising from the right ovary with a diameter of 6.2 cm. There is an eccentric nodule within the cyst which measures 1.7 cm in greatest diameter.         The left ovary measures 2.2 x 1.3 x 1.7 cm for an estimated volume of 2.4 mL. There are no focal solid or cystic lesions.               Impression         1. There is an IUD within the endometrial cavity. The uterus is normal in size and sonographic appearance. 2. Left ovary is within normal limits. 3. There is a large, greater than 6 cm cystic mass arising from the right ovary in containing a mural nodule.  The findings are similar to the prior study from 11 months prior however the differential includes a malignant cystic ovarian tumor versus    endometrioma versus other etiologies. Pelvic MRI correlation may be considered.                Discussed US as above with smaller ovarian size and overall stability of dermoid at 6 cm. Discussed options again including conservative management with repeat  and US 6 months vs surgical removal.  Risks and benefits discussed. Patient has opted to proceed with conservative management for now. Cyst is currently asx. Precautions discussed. Pt was seen with total face to face time of 25 minutes with more than 50% of the visit being counseling and education regarding encounter dx of ovarian cyst.  See discussion /counseling details as stated above. Vitals:  BP (!) 142/76   Ht 5' 8\" (1.727 m)   Wt 186 lb (84.4 kg)   LMP  (LMP Unknown)   BMI 28.28 kg/m²   Past Medical History:   Diagnosis Date    Allergic rhinitis     Allergies     Asthma     Closed dislocation of finger of left hand 5/4/2018    Dermoid tumor     Essential hypertension 5/15/2018    Hyperthyroidism     Thyroid disease      Past Surgical History:   Procedure Laterality Date    MANDIBLE SURGERY       Allergies:  Neomycin-bacitracin-polymyxin  [bacitracin-neomycin-polymyxin]  Current Outpatient Medications   Medication Sig Dispense Refill    desloratadine (CLARINEX) 5 MG tablet Take 1 tablet by mouth daily 90 tablet 3    folic acid (FOLVITE) 1 MG tablet Take 1 tablet by mouth daily 90 tablet 3    montelukast (SINGULAIR) 10 MG tablet Take 1 tablet by mouth nightly 90 tablet 3    levothyroxine (SYNTHROID) 75 MCG tablet Take 1 tablet by mouth Daily 90 tablet 2    WIXELA INHUB 100-50 MCG/DOSE diskus inhaler INHALE 1 PUFF AS INSTRUCTED TWICE DAILY. RINSE AND GARGLE MOUTH WITH WATER AFTER EACH USE      conjugated estrogens (PREMARIN) 0.625 MG/GM vaginal cream Use 0.5 g pv 2 to 3 times weekly.  (Patient not taking: Reported on 11/2/2020) 1 Tube 3    hydrochlorothiazide (HYDRODIURIL) 25 MG tablet Take 1 Not on file   Social History Narrative    Not on file        Family History   Problem Relation Age of Onset    Anemia Mother     Asthma Mother     Obesity Mother     Alcohol Abuse Neg Hx     Allergy (Severe) Neg Hx     Arthritis Neg Hx     Arrhythmia Neg Hx     Atrial Fibrillation Neg Hx     Birth Defects Neg Hx     Breast Cancer Neg Hx     Cancer Neg Hx     Coronary Art Dis Neg Hx     Colon Cancer Neg Hx     Depression Neg Hx     Diabetes Neg Hx     Early Death Neg Hx     Hearing Loss Neg Hx     Heart Attack Neg Hx     Heart Disease Neg Hx     High Blood Pressure Neg Hx     High Cholesterol Neg Hx     Kidney Disease Neg Hx     Learning Disabilities Neg Hx     Mental Illness Neg Hx     Mental Retardation Neg Hx     Miscarriages / Stillbirths Neg Hx     Osteoporosis Neg Hx     Prostate Cancer Neg Hx     Stroke Neg Hx     Substance Abuse Neg Hx     Vision Loss Neg Hx        Review of Systems   Constitutional: Negative. Negative for activity change, appetite change, chills, diaphoresis, fatigue, fever and unexpected weight change. HENT: Negative. Eyes: Negative. Respiratory: Negative. Cardiovascular: Negative. Gastrointestinal: Negative for abdominal distention, abdominal pain, anal bleeding, blood in stool, constipation, diarrhea, nausea, rectal pain and vomiting. Endocrine: Negative. Genitourinary: Negative for decreased urine volume, difficulty urinating, dyspareunia, dysuria, enuresis, flank pain, frequency, genital sores, hematuria, menstrual problem, pelvic pain, urgency, vaginal bleeding, vaginal discharge and vaginal pain. Musculoskeletal: Negative. Skin: Negative. Allergic/Immunologic: Negative. Neurological: Negative. Hematological: Negative. Psychiatric/Behavioral: Negative. Objective:     Physical Exam  Constitutional:       General: She is not in acute distress. Appearance: She is well-developed. She is not diaphoretic.    HENT: Head: Normocephalic and atraumatic. Eyes:      Conjunctiva/sclera: Conjunctivae normal.   Neck:      Musculoskeletal: Normal range of motion and neck supple. Cardiovascular:      Rate and Rhythm: Normal rate and regular rhythm. Pulmonary:      Effort: Pulmonary effort is normal. No respiratory distress. Musculoskeletal: Normal range of motion. General: No tenderness or deformity. Skin:     General: Skin is warm and dry. Coloration: Skin is not pale. Neurological:      Mental Status: She is alert and oriented to person, place, and time. Motor: No abnormal muscle tone. Coordination: Coordination normal.   Psychiatric:         Behavior: Behavior normal.         Thought Content: Thought content normal.         Judgment: Judgment normal.         Assessment:          Diagnosis Orders   1. Follow up     2. IUD check up     3. Adnexal mass  Ca 125    US PELVIS COMPLETE    US NON OB TRANSVAGINAL        Plan:      Medications placedthis encounter:  No orders of the defined types were placed in this encounter. Orders placedthis encounter:  Orders Placed This Encounter   Procedures    US PELVIS COMPLETE     Standing Status:   Future     Standing Expiration Date:   11/5/2021    US NON OB TRANSVAGINAL     Standing Status:   Future     Standing Expiration Date:   11/5/2021    Ca 125     Standing Status:   Future     Standing Expiration Date:   11/5/2021         Follow up:  Return in about 6 months (around 5/5/2021) for Ultrasound, Results Review.

## 2020-11-06 ENCOUNTER — NURSE ONLY (OUTPATIENT)
Dept: OBGYN CLINIC | Age: 55
End: 2020-11-06

## 2020-11-06 DIAGNOSIS — N94.89 ADNEXAL MASS: ICD-10-CM

## 2020-11-06 LAB — CA 125: 14 U/ML (ref 0–35)

## 2020-11-10 NOTE — LETTER
[address] [place] [address],[facility] [address] [place] [address]  Phone: [phone]  Fax: [phone]    Juan Miguel Pan MD        November 11, 2020    [place] [address] [id number]      Dear Kasie:    The results of your most recent Pap smear are normal. This means that no cancerous or precancerous cells were seen. We recommend that you come back in 1 year for your next routine Pap smear. If you have any questions or concerns, please don't hesitate to call.     Sincerely,        Juan Miguel Pan MD

## 2020-11-22 RX ORDER — LEVOTHYROXINE SODIUM 0.07 MG/1
75 TABLET ORAL DAILY
Qty: 90 TABLET | Refills: 2 | Status: SHIPPED | OUTPATIENT
Start: 2020-11-22 | End: 2021-04-28 | Stop reason: SDUPTHER

## 2020-11-23 ENCOUNTER — OFFICE VISIT (OUTPATIENT)
Dept: PRIMARY CARE CLINIC | Age: 55
End: 2020-11-23
Payer: COMMERCIAL

## 2020-11-23 VITALS
HEIGHT: 66 IN | WEIGHT: 186.8 LBS | HEART RATE: 89 BPM | OXYGEN SATURATION: 98 % | BODY MASS INDEX: 30.02 KG/M2 | DIASTOLIC BLOOD PRESSURE: 80 MMHG | SYSTOLIC BLOOD PRESSURE: 134 MMHG | RESPIRATION RATE: 16 BRPM

## 2020-11-23 DIAGNOSIS — E87.5 HYPERKALEMIA: ICD-10-CM

## 2020-11-23 DIAGNOSIS — Z00.00 PREVENTATIVE HEALTH CARE: ICD-10-CM

## 2020-11-23 LAB
ALBUMIN SERPL-MCNC: 4.5 G/DL (ref 3.5–4.6)
ALP BLD-CCNC: 171 U/L (ref 40–130)
ALT SERPL-CCNC: 39 U/L (ref 0–33)
ANION GAP SERPL CALCULATED.3IONS-SCNC: 15 MEQ/L (ref 9–15)
AST SERPL-CCNC: 78 U/L (ref 0–35)
BILIRUB SERPL-MCNC: 1 MG/DL (ref 0.2–0.7)
BUN BLDV-MCNC: 9 MG/DL (ref 6–20)
CALCIUM SERPL-MCNC: 9.5 MG/DL (ref 8.5–9.9)
CHLORIDE BLD-SCNC: 88 MEQ/L (ref 95–107)
CO2: 27 MEQ/L (ref 20–31)
CREAT SERPL-MCNC: 0.77 MG/DL (ref 0.5–0.9)
GFR AFRICAN AMERICAN: >60
GFR NON-AFRICAN AMERICAN: >60
GLOBULIN: 3.4 G/DL (ref 2.3–3.5)
GLUCOSE BLD-MCNC: 109 MG/DL (ref 70–99)
POTASSIUM SERPL-SCNC: 3.5 MEQ/L (ref 3.4–4.9)
SODIUM BLD-SCNC: 130 MEQ/L (ref 135–144)
TOTAL PROTEIN: 7.9 G/DL (ref 6.3–8)

## 2020-11-23 PROCEDURE — 99214 OFFICE O/P EST MOD 30 MIN: CPT | Performed by: INTERNAL MEDICINE

## 2020-11-23 ASSESSMENT — PATIENT HEALTH QUESTIONNAIRE - PHQ9
SUM OF ALL RESPONSES TO PHQ QUESTIONS 1-9: 0
1. LITTLE INTEREST OR PLEASURE IN DOING THINGS: 0
2. FEELING DOWN, DEPRESSED OR HOPELESS: 0
SUM OF ALL RESPONSES TO PHQ QUESTIONS 1-9: 0
SUM OF ALL RESPONSES TO PHQ9 QUESTIONS 1 & 2: 0
SUM OF ALL RESPONSES TO PHQ QUESTIONS 1-9: 0

## 2020-11-23 ASSESSMENT — ENCOUNTER SYMPTOMS
ABDOMINAL PAIN: 0
NAUSEA: 0
COUGH: 0
DIARRHEA: 0
COLOR CHANGE: 1
SHORTNESS OF BREATH: 0
VOMITING: 0
WHEEZING: 0

## 2020-11-23 NOTE — PROGRESS NOTES
abcess to the back of right knee phone: Not on file     Gets together: Not on file     Attends Latter-day service: Not on file     Active member of club or organization: Not on file     Attends meetings of clubs or organizations: Not on file     Relationship status: Not on file    Intimate partner violence     Fear of current or ex partner: Not on file     Emotionally abused: Not on file     Physically abused: Not on file     Forced sexual activity: Not on file   Other Topics Concern    Not on file   Social History Narrative    Not on file     Family History   Problem Relation Age of Onset    Anemia Mother     Asthma Mother     Obesity Mother     Alcohol Abuse Neg Hx     Allergy (Severe) Neg Hx     Arthritis Neg Hx     Arrhythmia Neg Hx     Atrial Fibrillation Neg Hx     Birth Defects Neg Hx     Breast Cancer Neg Hx     Cancer Neg Hx     Coronary Art Dis Neg Hx     Colon Cancer Neg Hx     Depression Neg Hx     Diabetes Neg Hx     Early Death Neg Hx     Hearing Loss Neg Hx     Heart Attack Neg Hx     Heart Disease Neg Hx     High Blood Pressure Neg Hx     High Cholesterol Neg Hx     Kidney Disease Neg Hx     Learning Disabilities Neg Hx     Mental Illness Neg Hx     Mental Retardation Neg Hx     Miscarriages / Stillbirths Neg Hx     Osteoporosis Neg Hx     Prostate Cancer Neg Hx     Stroke Neg Hx     Substance Abuse Neg Hx     Vision Loss Neg Hx      Allergies:  Neomycin-bacitracin-polymyxin  [bacitracin-neomycin-polymyxin]  Patient Active Problem List   Diagnosis    Acquired hypothyroidism    Allergic conjunctivitis of both eyes    Closed dislocation of finger of left hand    Diverticulosis of sigmoid colon    Essential hypertension    Mild persistent asthma without complication    Myopia of both eyes    Seasonal and perennial allergic rhinitis    Stiffness of finger joint, left    Pelvic mass     Current Outpatient Medications on File Prior to Visit   Medication Sig Dispense Refill    levothyroxine (SYNTHROID) 75 MCG tablet Take 1 tablet by mouth Daily 90 tablet 2    desloratadine (CLARINEX) 5 MG tablet Take 1 tablet by mouth daily 90 tablet 3    folic acid (FOLVITE) 1 MG tablet Take 1 tablet by mouth daily 90 tablet 3    montelukast (SINGULAIR) 10 MG tablet Take 1 tablet by mouth nightly 90 tablet 3    WIXELA INHUB 100-50 MCG/DOSE diskus inhaler INHALE 1 PUFF AS INSTRUCTED TWICE DAILY. RINSE AND GARGLE MOUTH WITH WATER AFTER EACH USE      conjugated estrogens (PREMARIN) 0.625 MG/GM vaginal cream Use 0.5 g pv 2 to 3 times weekly. 1 Tube 3    hydrochlorothiazide (HYDRODIURIL) 25 MG tablet Take 1 tablet by mouth every morning 90 tablet 3    FLUoxetine (PROZAC) 10 MG capsule       albuterol sulfate  (90 Base) MCG/ACT inhaler Inhale 2 puffs into the lungs every 4 hours as needed for Wheezing or Shortness of Breath 1 Inhaler 1    magnesium oxide (MAG-OX) 400 MG tablet TAKE 1 TABLET BY MOUTH EVERY DAY  3    albuterol (PROVENTIL) (2.5 MG/3ML) 0.083% nebulizer solution Inhale by nebulizer over 5-15 minutes three (3) to four (4) times a day as needed for cough/wheezing/shortness of breath       No current facility-administered medications on file prior to visit. Review of Systems   Constitutional: Negative for chills, diaphoresis, fatigue and fever. HENT: Negative for congestion, ear discharge and ear pain. Respiratory: Negative for cough, shortness of breath and wheezing. Cardiovascular: Negative for chest pain. Gastrointestinal: Negative for abdominal pain, diarrhea, nausea and vomiting. Endocrine: Negative for cold intolerance and heat intolerance. Genitourinary: Negative for dysuria and frequency. Skin: Positive for color change and rash. Neurological: Negative for dizziness and light-headedness. Psychiatric/Behavioral: Negative for dysphoric mood. The patient is not nervous/anxious.       Objective:   /80 (Site: Left Upper Arm, Position: Sitting, Cuff Size: Medium Adult)   Pulse 89   Resp 16   Ht 5' 6\" (1.676 m)   Wt 186 lb 12.8 oz (84.7 kg)   LMP  (LMP Unknown)   SpO2 98%   BMI 30.15 kg/m²     Physical Exam  Constitutional:       General: She is not in acute distress. Appearance: Normal appearance. She is well-developed. Cardiovascular:      Rate and Rhythm: Normal rate and regular rhythm. Pulses: Normal pulses. Heart sounds: Normal heart sounds. No murmur. Pulmonary:      Effort: Pulmonary effort is normal. No respiratory distress. Breath sounds: Normal breath sounds. Abdominal:      General: Bowel sounds are normal. There is no distension. Palpations: Abdomen is soft. Tenderness: There is no abdominal tenderness. There is no guarding. Skin:     Comments: Left forearm and anterior thigh erythematous scaly patches. Neurological:      Mental Status: She is alert. Psychiatric:         Mood and Affect: Mood normal.         Behavior: Behavior normal.       Assessment:       Diagnosis Orders   1. Actinic keratosis  IFEOMA Dexter MD, Dermatology, [facility]   2. Hyperkalemia  Comprehensive Metabolic Panel   3.  Preventative health care  zoster recombinant adjuvanted vaccine [facility]) 50 MCG/0.5ML SUSR injection    HIV-1,2 Combo Ag/Ab By JAMES, Reflexive Panel     Plan:      Orders Placed This Encounter   Procedures    Comprehensive Metabolic Panel     Standing Status:   Future     Number of Occurrences:   1     Standing Expiration Date:   11/23/2021    HIV-1,2 Combo Ag/Ab By JAMES, Reflexive Panel     Standing Status:   Future     Number of Occurrences:   1     Standing Expiration Date:   11/23/2021   Fermín Gee MD, Dermatology, [facility]     Referral Priority:   Routine     Referral Type:   Eval and Treat     Referral Reason:   Specialty Services Required     Referred to Provider:   Yajaira Burch DO     Requested Specialty:   Dermatology     Number of Visits Requested:   1     Orders Placed This Encounter   Medications    zoster recombinant adjuvanted vaccine [facility]) 50 MCG/0.5ML SUSR injection     Sig: Inject 0.5 mLs into the muscle once for 1 dose 50 MCG IM then repeat 2-6 months. Dispense:  1 each     Refill:  1     Return in about 6 months (around 5/23/2021) for with PCP.

## 2020-11-24 LAB — HIV 1,2 COMBO ANTIGEN/ANTIBODY: NEGATIVE

## 2020-12-23 RX ORDER — HYDROCHLOROTHIAZIDE 25 MG/1
25 TABLET ORAL EVERY MORNING
Qty: 90 TABLET | Refills: 3 | Status: SHIPPED | OUTPATIENT
Start: 2020-12-23 | End: 2021-04-28 | Stop reason: SDUPTHER

## 2021-01-15 ENCOUNTER — VIRTUAL VISIT (OUTPATIENT)
Dept: PRIMARY CARE CLINIC | Age: 56
End: 2021-01-15
Payer: COMMERCIAL

## 2021-01-15 DIAGNOSIS — H26.9 CATARACT OF RIGHT EYE, UNSPECIFIED CATARACT TYPE: Primary | ICD-10-CM

## 2021-01-15 PROCEDURE — 99441 PR PHYS/QHP TELEPHONE EVALUATION 5-10 MIN: CPT | Performed by: INTERNAL MEDICINE

## 2021-01-15 NOTE — PROGRESS NOTES
Mimi Talley is a 50 y.o. female evaluated via telephone on 1/15/2021. Consent:  She and/or health care decision maker is aware that that she may receive a bill for this telephone service, depending on her insurance coverage, and has provided verbal consent to proceed: Yes  Documentation:  I communicated with the patient and/or health care decision maker. Details of this discussion including any medical advice provided:     Pt requests referral to ophthalmology for cataract resection. Told by optometrist that she had a right eye cataract. I affirm this is a Patient Initiated Episode with a Patient who has not had a related appointment within my department in the past 7 days or scheduled within the next 24 hours.     Patient identification was verified at the start of the visit: Yes    Total Time: minutes: 5-10 minutes    Note: not billable if this call serves to triage the patient into an appointment for the relevant concern    Adeel Busing

## 2021-04-28 DIAGNOSIS — J45.30 MILD PERSISTENT ASTHMA WITHOUT COMPLICATION: ICD-10-CM

## 2021-04-28 DIAGNOSIS — Z00.00 PREVENTATIVE HEALTH CARE: ICD-10-CM

## 2021-04-28 DIAGNOSIS — J30.2 SEASONAL ALLERGIES: Primary | ICD-10-CM

## 2021-04-28 RX ORDER — MAGNESIUM OXIDE 400 MG/1
TABLET ORAL
Qty: 60 TABLET | Refills: 3 | Status: SHIPPED | OUTPATIENT
Start: 2021-04-28 | End: 2021-06-21 | Stop reason: SDUPTHER

## 2021-04-28 RX ORDER — ALBUTEROL SULFATE 2.5 MG/3ML
SOLUTION RESPIRATORY (INHALATION)
Qty: 120 EACH | Refills: 5 | Status: SHIPPED | OUTPATIENT
Start: 2021-04-28

## 2021-04-28 RX ORDER — FOLIC ACID 1 MG/1
1 TABLET ORAL DAILY
Qty: 90 TABLET | Refills: 3 | Status: SHIPPED | OUTPATIENT
Start: 2021-04-28 | End: 2022-06-01

## 2021-04-28 RX ORDER — CONJUGATED ESTROGENS 0.62 MG/G
CREAM VAGINAL
Qty: 3 TUBE | Refills: 3 | Status: SHIPPED | OUTPATIENT
Start: 2021-04-28 | End: 2021-12-01 | Stop reason: SDUPTHER

## 2021-04-28 RX ORDER — DESLORATADINE 5 MG/1
5 TABLET ORAL DAILY
Qty: 90 TABLET | Refills: 3 | Status: SHIPPED | OUTPATIENT
Start: 2021-04-28 | End: 2022-05-26

## 2021-04-28 RX ORDER — MONTELUKAST SODIUM 10 MG/1
10 TABLET ORAL NIGHTLY
Qty: 90 TABLET | Refills: 3 | Status: SHIPPED | OUTPATIENT
Start: 2021-04-28 | End: 2022-03-29 | Stop reason: SDUPTHER

## 2021-06-21 RX ORDER — MAGNESIUM OXIDE 400 MG/1
TABLET ORAL
Qty: 90 TABLET | Refills: 3 | Status: SHIPPED | OUTPATIENT
Start: 2021-06-21 | End: 2021-12-01 | Stop reason: SDUPTHER

## 2021-06-30 ENCOUNTER — OFFICE VISIT (OUTPATIENT)
Dept: PRIMARY CARE CLINIC | Age: 56
End: 2021-06-30
Payer: COMMERCIAL

## 2021-06-30 VITALS
OXYGEN SATURATION: 98 % | HEIGHT: 66 IN | DIASTOLIC BLOOD PRESSURE: 82 MMHG | WEIGHT: 180.2 LBS | BODY MASS INDEX: 28.96 KG/M2 | HEART RATE: 90 BPM | RESPIRATION RATE: 18 BRPM | SYSTOLIC BLOOD PRESSURE: 122 MMHG

## 2021-06-30 DIAGNOSIS — R79.89 LFT ELEVATION: ICD-10-CM

## 2021-06-30 DIAGNOSIS — Z00.00 HEALTH CARE MAINTENANCE: ICD-10-CM

## 2021-06-30 DIAGNOSIS — R16.0 LIVER MASS: ICD-10-CM

## 2021-06-30 PROCEDURE — 99214 OFFICE O/P EST MOD 30 MIN: CPT | Performed by: INTERNAL MEDICINE

## 2021-06-30 SDOH — ECONOMIC STABILITY: FOOD INSECURITY: WITHIN THE PAST 12 MONTHS, YOU WORRIED THAT YOUR FOOD WOULD RUN OUT BEFORE YOU GOT MONEY TO BUY MORE.: NEVER TRUE

## 2021-06-30 SDOH — ECONOMIC STABILITY: FOOD INSECURITY: WITHIN THE PAST 12 MONTHS, THE FOOD YOU BOUGHT JUST DIDN'T LAST AND YOU DIDN'T HAVE MONEY TO GET MORE.: NEVER TRUE

## 2021-06-30 ASSESSMENT — PATIENT HEALTH QUESTIONNAIRE - PHQ9
SUM OF ALL RESPONSES TO PHQ QUESTIONS 1-9: 0
2. FEELING DOWN, DEPRESSED OR HOPELESS: 0
1. LITTLE INTEREST OR PLEASURE IN DOING THINGS: 0
SUM OF ALL RESPONSES TO PHQ QUESTIONS 1-9: 0
SUM OF ALL RESPONSES TO PHQ QUESTIONS 1-9: 0
SUM OF ALL RESPONSES TO PHQ9 QUESTIONS 1 & 2: 0

## 2021-06-30 ASSESSMENT — ENCOUNTER SYMPTOMS
NAUSEA: 0
DIARRHEA: 0
VOMITING: 0
COUGH: 0
ABDOMINAL PAIN: 1
WHEEZING: 0
SHORTNESS OF BREATH: 0

## 2021-06-30 ASSESSMENT — SOCIAL DETERMINANTS OF HEALTH (SDOH): HOW HARD IS IT FOR YOU TO PAY FOR THE VERY BASICS LIKE FOOD, HOUSING, MEDICAL CARE, AND HEATING?: NOT VERY HARD

## 2021-06-30 NOTE — PROGRESS NOTES
Subjective:      Patient ID: Brandon Watts. Tejas Monahan is a 49 y.o. female    Follow up for LFT elevation   HPI  Pt presents for follow up regarding LFT elevation. Yet to complete f/u labs and live US. However stopped drinking alcohol  2 months ago. She apprised me of liver US at [facility] in 2019: Hepatomegaly with hepatic steatosis. 2. Ill-defined 7.7 x 4.4 x 4.2 cm hypoechoic area adjacent to the gallbladder fossa, may reflect focal fatty sparing but recommend MRI with contrast to further evaluate. 3. Sludge within the gallbladder. 4. Small amount of ascites. Pt complains of epigastric abd mass but no pain. NO NVDC.    Past Medical History:   Diagnosis Date    Allergic rhinitis     Allergies     Asthma     Closed dislocation of finger of left hand 5/4/2018    Dermoid tumor     Essential hypertension 5/15/2018    Hyperthyroidism     Thyroid disease      Past Surgical History:   Procedure Laterality Date    MANDIBLE SURGERY       Social History     Socioeconomic History    Marital status:      Spouse name: Terry Claire Number of children: 0    Years of education: 23    Highest education level: Professional school degree (e.g., MD, DDS, DVM, VIVIENNE)   Occupational History    Occupation: retired   Tobacco Use    Smoking status: Former Smoker    Smokeless tobacco: Never Used   Vaping Use    Vaping Use: Never used   Substance and Sexual Activity    Alcohol use: Yes     Comment: occassionally    Drug use: Not Currently    Sexual activity: Yes     Partners: Male     Comment:  and monogamous   Other Topics Concern    Not on file   Social History Narrative    Not on file     Social Determinants of Health     Financial Resource Strain: Low Risk     Difficulty of Paying Living Expenses: Not very hard   Food Insecurity: No Food Insecurity    Worried About Running Out of Food in the Last Year: Never true    Ashvin of Food in the Last Year: Never true   Transportation Needs:     Lack of Transportation (Medical):      Lack of Transportation (Non-Medical):    Physical Activity:     Days of Exercise per Week:     Minutes of Exercise per Session:    Stress:     Feeling of Stress :    Social Connections:     Frequency of Communication with Friends and Family:     Frequency of Social Gatherings with Friends and Family:     Attends Mosque Services:     Active Member of Clubs or Organizations:     Attends Club or Organization Meetings:     Marital Status:    Intimate Partner Violence:     Fear of Current or Ex-Partner:     Emotionally Abused:     Physically Abused:     Sexually Abused:      Family History   Problem Relation Age of Onset    Anemia Mother     Asthma Mother     Obesity Mother     Alcohol Abuse Neg Hx     Allergy (Severe) Neg Hx     Arthritis Neg Hx     Arrhythmia Neg Hx     Atrial Fibrillation Neg Hx     Birth Defects Neg Hx     Breast Cancer Neg Hx     Cancer Neg Hx     Coronary Art Dis Neg Hx     Colon Cancer Neg Hx     Depression Neg Hx     Diabetes Neg Hx     Early Death Neg Hx     Hearing Loss Neg Hx     Heart Attack Neg Hx     Heart Disease Neg Hx     High Blood Pressure Neg Hx     High Cholesterol Neg Hx     Kidney Disease Neg Hx     Learning Disabilities Neg Hx     Mental Illness Neg Hx     Mental Retardation Neg Hx     Miscarriages / Stillbirths Neg Hx     Osteoporosis Neg Hx     Prostate Cancer Neg Hx     Stroke Neg Hx     Substance Abuse Neg Hx     Vision Loss Neg Hx      Allergies:  Neomycin-bacitracin-polymyxin  [bacitracin-neomycin-polymyxin]  Patient Active Problem List   Diagnosis    Acquired hypothyroidism    Allergic conjunctivitis of both eyes    Closed dislocation of finger of left hand    Diverticulosis of sigmoid colon    Essential hypertension    Mild persistent asthma without complication    Myopia of both eyes    Seasonal and perennial allergic rhinitis    Stiffness of finger joint, left    Pelvic mass Objective:   /82 (Site: Left Upper Arm, Position: Sitting, Cuff Size: Medium Adult)   Pulse 90   Resp 18   Ht 5' 6\" (1.676 m)   Wt 180 lb 3.2 oz (81.7 kg)   LMP  (LMP Unknown)   SpO2 98%   BMI 29.09 kg/m²     Physical Exam  Constitutional:       General: She is in acute distress (anxious about underging further work up for LFT elevation ). Appearance: She is not diaphoretic. Cardiovascular:      Rate and Rhythm: Normal rate and regular rhythm. Pulses: Normal pulses. Heart sounds: Normal heart sounds, S1 normal and S2 normal.   Pulmonary:      Effort: Pulmonary effort is normal. No respiratory distress. Breath sounds: Normal breath sounds. No wheezing or rales. Chest:      Chest wall: No tenderness. Abdominal:      General: Bowel sounds are normal.      Palpations: There is mass (soft superficial nontender epigastric region mass palpable only upon sitting up or standing, disappears with laying down). Tenderness: There is no abdominal tenderness. Neurological:      General: No focal deficit present. Mental Status: She is alert. Cranial Nerves: No cranial nerve deficit. Psychiatric:         Mood and Affect: Mood normal.         Thought Content: Thought content normal.       Assessment:       Diagnosis Orders   1. Liver mass  MRI ABDOMEN W CONTRAST   2. Health care maintenance  TSH with Reflex   3.  LFT elevation  MRI ABDOMEN W CONTRAST     Plan:      Orders Placed This Encounter   Procedures    MRI ABDOMEN W CONTRAST     3 or 4 phase MRI     Standing Status:   Future     Standing Expiration Date:   6/30/2022     Scheduling Instructions:      3 or 4 phase MRI     Order Specific Question:   Reason for exam:     Answer:   hx alcohol abuse, LFT elevation and liver mass    TSH with Reflex     Standing Status:   Future     Number of Occurrences:   1     Standing Expiration Date:   6/30/2022     No orders of the defined types were placed in this encounter. Return in about 1 month (around 7/30/2021) for follow up.

## 2021-07-08 DIAGNOSIS — R21 RASH: Primary | ICD-10-CM

## 2021-07-08 RX ORDER — MOMETASONE FUROATE 1 MG/G
CREAM TOPICAL
Qty: 1 TUBE | Refills: 1 | Status: SHIPPED | OUTPATIENT
Start: 2021-07-08 | End: 2021-12-02 | Stop reason: SDUPTHER

## 2021-07-14 ENCOUNTER — APPOINTMENT (OUTPATIENT)
Dept: ULTRASOUND IMAGING | Age: 56
End: 2021-07-14
Payer: COMMERCIAL

## 2021-07-14 ENCOUNTER — HOSPITAL ENCOUNTER (OUTPATIENT)
Dept: WOMENS IMAGING | Age: 56
Discharge: HOME | End: 2021-07-16
Payer: COMMERCIAL

## 2021-07-14 ENCOUNTER — HOSPITAL ENCOUNTER (OUTPATIENT)
Dept: ULTRASOUND IMAGING | Age: 56
Discharge: HOME | End: 2021-07-16
Payer: COMMERCIAL

## 2021-07-14 VITALS — BODY MASS INDEX: 27.4 KG/M2 | HEIGHT: 68 IN

## 2021-07-14 DIAGNOSIS — N94.89 ADNEXAL MASS: ICD-10-CM

## 2021-07-14 DIAGNOSIS — Z12.31 SCREENING MAMMOGRAM, ENCOUNTER FOR: ICD-10-CM

## 2021-07-14 PROCEDURE — 76830 TRANSVAGINAL US NON-OB: CPT

## 2021-07-14 PROCEDURE — 76856 US EXAM PELVIC COMPLETE: CPT

## 2021-07-14 PROCEDURE — 77067 SCR MAMMO BI INCL CAD: CPT

## 2021-07-16 ENCOUNTER — TELEPHONE (OUTPATIENT)
Dept: PRIMARY CARE CLINIC | Age: 56
End: 2021-07-16

## 2021-07-19 ENCOUNTER — NURSE TRIAGE (OUTPATIENT)
Dept: OTHER | Facility: CLINIC | Age: 56
End: 2021-07-19

## 2021-07-19 ENCOUNTER — OFFICE VISIT (OUTPATIENT)
Dept: PRIMARY CARE CLINIC | Age: 56
End: 2021-07-19
Payer: COMMERCIAL

## 2021-07-19 VITALS
HEART RATE: 61 BPM | DIASTOLIC BLOOD PRESSURE: 60 MMHG | TEMPERATURE: 98.5 F | SYSTOLIC BLOOD PRESSURE: 98 MMHG | WEIGHT: 179 LBS | HEIGHT: 67 IN | OXYGEN SATURATION: 98 % | BODY MASS INDEX: 28.09 KG/M2

## 2021-07-19 DIAGNOSIS — R20.2 NUMBNESS AND TINGLING OF BOTH FEET: Primary | ICD-10-CM

## 2021-07-19 DIAGNOSIS — R20.0 NUMBNESS AND TINGLING OF BOTH FEET: ICD-10-CM

## 2021-07-19 DIAGNOSIS — R16.0 LIVER MASS: ICD-10-CM

## 2021-07-19 DIAGNOSIS — R20.0 NUMBNESS AND TINGLING OF BOTH FEET: Primary | ICD-10-CM

## 2021-07-19 DIAGNOSIS — R20.2 NUMBNESS AND TINGLING OF BOTH FEET: ICD-10-CM

## 2021-07-19 DIAGNOSIS — R74.8 ALKALINE PHOSPHATASE ELEVATION: ICD-10-CM

## 2021-07-19 DIAGNOSIS — E87.1 HYPONATREMIA: ICD-10-CM

## 2021-07-19 DIAGNOSIS — R74.8 ELEVATED LIVER ENZYMES: ICD-10-CM

## 2021-07-19 LAB
ALBUMIN SERPL-MCNC: 4.8 G/DL (ref 3.5–4.6)
ALP BLD-CCNC: 82 U/L (ref 40–130)
ALT SERPL-CCNC: 14 U/L (ref 0–33)
ANION GAP SERPL CALCULATED.3IONS-SCNC: 15 MEQ/L (ref 9–15)
AST SERPL-CCNC: 21 U/L (ref 0–35)
BILIRUB SERPL-MCNC: 2.3 MG/DL (ref 0.2–0.7)
BUN BLDV-MCNC: 12 MG/DL (ref 6–20)
CALCIUM SERPL-MCNC: 10.5 MG/DL (ref 8.5–9.9)
CHLORIDE BLD-SCNC: 91 MEQ/L (ref 95–107)
CO2: 26 MEQ/L (ref 20–31)
CREAT SERPL-MCNC: 0.91 MG/DL (ref 0.5–0.9)
GAMMA GLUTAMYL TRANSFERASE: 136 U/L (ref 0–40)
GFR AFRICAN AMERICAN: >60
GFR NON-AFRICAN AMERICAN: >60
GLOBULIN: 3.2 G/DL (ref 2.3–3.5)
GLUCOSE BLD-MCNC: 91 MG/DL (ref 70–99)
HBA1C MFR BLD: 5.7 % (ref 4.8–5.9)
HEPATITIS C ANTIBODY INTERPRETATION: NORMAL
POTASSIUM SERPL-SCNC: 3.5 MEQ/L (ref 3.4–4.9)
SODIUM BLD-SCNC: 132 MEQ/L (ref 135–144)
TOTAL PROTEIN: 8 G/DL (ref 6.3–8)
VITAMIN B-12: 654 PG/ML (ref 232–1245)

## 2021-07-19 PROCEDURE — 99214 OFFICE O/P EST MOD 30 MIN: CPT | Performed by: INTERNAL MEDICINE

## 2021-07-19 ASSESSMENT — ENCOUNTER SYMPTOMS
ABDOMINAL PAIN: 0
SINUS PRESSURE: 0
SHORTNESS OF BREATH: 0
COUGH: 0
VOMITING: 0
DIARRHEA: 0
SINUS PAIN: 0
WHEEZING: 0
SORE THROAT: 0
RHINORRHEA: 0
NAUSEA: 0

## 2021-07-19 NOTE — TELEPHONE ENCOUNTER
Received call from [facility] at [facility] with Red Flag Complaint. Brief description of triage: Numbness in feet since going to [facility], last normal was last Wednesday or Thursday. States she has an appointment at 10:30 this morning but wanted to see about getting in sooner so she could head back to [facility]. Triage indicates for patient to be seen within 3 days. Advised patient to go to [facility] if unable to get appointment. Care advice provided, patient verbalizes understanding; denies any other questions or concerns; instructed to call back for any new or worsening symptoms. Called [facility] [place] but was on hold for a couple minutes so message was sent to [place] to call patient for scheduling. Attention Provider: Thank you for allowing me to participate in the care of your patient. The patient was connected to triage in response to information provided to the [facility]. Please do not respond through this encounter as the response is not directed to a shared pool. Reason for Disposition   Numbness or tingling on both sides of body and is a new symptom lasting > 24 hours    Answer Assessment - Initial Assessment Questions  1. SYMPTOM: \"What is the main symptom you are concerned about? \" (e.g., weakness, numbness)      Numbness/tingling in feet, feeling like she has socks on    2. ONSET: \"When did this start? \" (minutes, hours, days; while sleeping)      A couple weeks ago    3. LAST NORMAL: \"When was the last time you were normal (no symptoms)? \"      Last Wed or Thurs    4. PATTERN \"Does this come and go, or has it been constant since it started? \"  \"Is it present now? \"      Comes and goes    5. CARDIAC SYMPTOMS: \"Have you had any of the following symptoms: chest pain, difficulty breathing, palpitations? \"      No    6. NEUROLOGIC SYMPTOMS: \"Have you had any of the following symptoms: headache, dizziness, vision loss, double vision, changes in speech, unsteady on your feet? \"      No    7.  OTHER

## 2021-07-19 NOTE — PROGRESS NOTES
Subjective:      Patient ID: Kiki Murphy. Jaleesa Hernandez is a 49 y.o. female    Foot numbness x 5 days   HPI  Pt presents with 5 days numbness and tingling in both feet and worse at night. No foot weakness. Attests to walking in hard shoes. Normal TSH last month, HIV negative in 2020. No double vision, no incontinence or eye pain. No  fam hx MS. [place] to complete abd MRI due to cost and would rather be oblivious of any possible \"bad news\" than be made aware. She might reconsider.   Past Medical History:   Diagnosis Date    Allergic rhinitis     Allergies     Asthma     Closed dislocation of finger of left hand 2018    Dermoid tumor     Essential hypertension 5/15/2018    Hyperthyroidism     Thyroid disease      Past Surgical History:   Procedure Laterality Date    LASIK Bilateral 2021    MANDIBLE SURGERY       Social History     Socioeconomic History    Marital status:      Spouse name: Miguelina Tate Number of children: 0    Years of education: 23    Highest education level: Professional school degree (e.g., MD, DDS, DVM, VIVIENNE)   Occupational History    Occupation: retired   Tobacco Use    Smoking status: Former Smoker     Packs/day: 0.25     Years: 5.00     Pack years: 1.25     Types: Cigarettes     Quit date: 1990     Years since quittin.0    Smokeless tobacco: Never Used   Vaping Use    Vaping Use: Never used   Substance and Sexual Activity    Alcohol use: Yes     Comment: occassionally    Drug use: Not Currently    Sexual activity: Yes     Partners: Male     Comment:  and monogamous   Other Topics Concern    Not on file   Social History Narrative    Not on file     Social Determinants of Health     Financial Resource Strain: Low Risk     Difficulty of Paying Living Expenses: Not very hard   Food Insecurity: No Food Insecurity    Worried About Running Out of Food in the Last Year: Never true    Ashvin of Food in the Last Year: Never true   Transportation Needs:     Lack of Transportation (Medical):      Lack of Transportation (Non-Medical):    Physical Activity:     Days of Exercise per Week:     Minutes of Exercise per Session:    Stress:     Feeling of Stress :    Social Connections:     Frequency of Communication with Friends and Family:     Frequency of Social Gatherings with Friends and Family:     Attends Latter day Services:     Active Member of Clubs or Organizations:     Attends Club or Organization Meetings:     Marital Status:    Intimate Partner Violence:     Fear of Current or Ex-Partner:     Emotionally Abused:     Physically Abused:     Sexually Abused:      Family History   Problem Relation Age of Onset    Anemia Mother     Asthma Mother     Obesity Mother     Alcohol Abuse Neg Hx     Allergy (Severe) Neg Hx     Arthritis Neg Hx     Arrhythmia Neg Hx     Atrial Fibrillation Neg Hx     Birth Defects Neg Hx     Breast Cancer Neg Hx     Cancer Neg Hx     Coronary Art Dis Neg Hx     Colon Cancer Neg Hx     Depression Neg Hx     Diabetes Neg Hx     Early Death Neg Hx     Hearing Loss Neg Hx     Heart Attack Neg Hx     Heart Disease Neg Hx     High Blood Pressure Neg Hx     High Cholesterol Neg Hx     Kidney Disease Neg Hx     Learning Disabilities Neg Hx     Mental Illness Neg Hx     Mental Retardation Neg Hx     Miscarriages / Stillbirths Neg Hx     Osteoporosis Neg Hx     Prostate Cancer Neg Hx     Stroke Neg Hx     Substance Abuse Neg Hx     Vision Loss Neg Hx      Allergies:  Neomycin-bacitracin-polymyxin  [bacitracin-neomycin-polymyxin]  Patient Active Problem List   Diagnosis    Acquired hypothyroidism    Allergic conjunctivitis of both eyes    Closed dislocation of finger of left hand    Diverticulosis of sigmoid colon    Essential hypertension    Mild persistent asthma without complication    Myopia of both eyes    Seasonal and perennial allergic rhinitis    Stiffness of finger joint, left    Pelvic mass dysuria and frequency. Neurological: Positive for numbness. Negative for dizziness and light-headedness. Objective:   BP 98/60   Pulse 61   Temp 98.5 °F (36.9 °C)   Ht 5' 6.5\" (1.689 m)   Wt 179 lb (81.2 kg)   LMP  (LMP Unknown)   SpO2 98%   Breastfeeding No   BMI 28.46 kg/m²     Physical Exam  Constitutional:       General: She is not in acute distress. Appearance: She is not diaphoretic. Cardiovascular:      Rate and Rhythm: Normal rate and regular rhythm. Pulses: Normal pulses. Heart sounds: Normal heart sounds, S1 normal and S2 normal. No murmur heard. Pulmonary:      Effort: Pulmonary effort is normal. No respiratory distress. Breath sounds: Normal breath sounds. No wheezing or rales. Chest:      Chest wall: No tenderness. Abdominal:      General: Bowel sounds are normal.      Tenderness: There is no abdominal tenderness. Neurological:      General: No focal deficit present. Mental Status: She is alert. Cranial Nerves: No cranial nerve deficit. Sensory: No sensory deficit (no hypoesthesia to soft touch or on prick). Psychiatric:         Mood and Affect: Mood normal.       Assessment:       Diagnosis Orders   1. Numbness and tingling of both feet  Hemoglobin A1C    Vitamin B12   2. Liver mass      await liver MRI if pt decides to complete it      Plan:      Orders Placed This Encounter   Procedures    Hemoglobin A1C     Standing Status:   Future     Number of Occurrences:   1     Standing Expiration Date:   7/19/2022    Vitamin B12     Standing Status:   Future     Number of Occurrences:   1     Standing Expiration Date:   7/19/2022     No orders of the defined types were placed in this encounter. No follow-ups on file.

## 2021-07-20 ENCOUNTER — OFFICE VISIT (OUTPATIENT)
Dept: OBGYN CLINIC | Age: 56
End: 2021-07-20
Payer: COMMERCIAL

## 2021-07-20 VITALS — SYSTOLIC BLOOD PRESSURE: 126 MMHG | DIASTOLIC BLOOD PRESSURE: 74 MMHG | WEIGHT: 178 LBS | BODY MASS INDEX: 28.3 KG/M2

## 2021-07-20 DIAGNOSIS — N94.89 ADNEXAL MASS: ICD-10-CM

## 2021-07-20 DIAGNOSIS — N94.89 ADNEXAL MASS: Primary | ICD-10-CM

## 2021-07-20 LAB — CA 125: 12.6 U/ML (ref 0–35)

## 2021-07-20 PROCEDURE — 99212 OFFICE O/P EST SF 10 MIN: CPT | Performed by: OBSTETRICS & GYNECOLOGY

## 2021-07-20 PROCEDURE — 36415 COLL VENOUS BLD VENIPUNCTURE: CPT | Performed by: OBSTETRICS & GYNECOLOGY

## 2021-07-20 ASSESSMENT — ENCOUNTER SYMPTOMS
BLOOD IN STOOL: 0
DIARRHEA: 0
ABDOMINAL PAIN: 0
ALLERGIC/IMMUNOLOGIC NEGATIVE: 1
NAUSEA: 0
ABDOMINAL DISTENTION: 0
EYES NEGATIVE: 1
VOMITING: 0
RECTAL PAIN: 0
RESPIRATORY NEGATIVE: 1
CONSTIPATION: 0
ANAL BLEEDING: 0

## 2021-07-20 NOTE — PROGRESS NOTES
Patient here to discuss F/U US results for persistent right adnexal mass ( see previous note ). US as follows:       EXAMINATION: US PELVIS COMPLETE, US NON OB TRANSVAGINAL       DATE AND TIME:7/14/2021 8:24 AM       CLINICAL HISTORY: Pelvic pain  N94.89 Adnexal mass ICD10        COMPARISON: November 4, 2020       TECHNIQUE:Transabdominal and transvaginal. Transvaginal scans provided for more accurate assessment of the uterus and adnexa.       FINDINGS:       Uterus is midline uterus measures 7.3 cm. There is an IUD device in the midline endometrium in proper position. Optimal assessment of the endometrium is otherwise limited. No focal uterine mass.       Within the right adnexa there is a prominent 5 x 5.3 cm ovarian cyst with low-level echoes and peripheral nodularity similar in appearance to the previous ultrasound. Portions of the inferior margin are ill-defined. The possibility of ovarian dermoid is    not excluded. This could be further characterized with CT if clinically warranted. There is good flow to the right ovary.       Left ovary was not able to be visualized.           Impression   PERSISTENT 5.3 CM COMPLEX CYSTIC MASS IN THE RIGHT ADNEXA WITH MURAL NODULARITY. LESION IS STABLE. TUMOR NOT EXCLUDED. THE POSSIBILITY OF OVARIAN DERMOID IS RAISED. THIS COULD BE BETTER CHARACTERIZED WITH CT OR MRI IF CLINICALLY WARRANTED.                Discussed US findings with slightly smaller right adnexal mass; primarily cystic with some mural nodularity. More likely dermoid, but tumor not excluded. Patient has opted to continue to manage conservatively. Has not had  yet ( F/U ) ; done today. Also encouraged patient to have pelvic MRI for further evaluation. All questions answered. F/U as directed.         Vitals:  /74   Wt 178 lb (80.7 kg)   LMP  (LMP Unknown)   BMI 28.30 kg/m²   Past Medical History:   Diagnosis Date    Allergic rhinitis     Allergies     Asthma     Closed dislocation of finger of left hand 5/4/2018    Dermoid tumor     Essential hypertension 5/15/2018    Hyperthyroidism     Thyroid disease      Past Surgical History:   Procedure Laterality Date    LASIK Bilateral 05/2021    MANDIBLE SURGERY       Allergies:  Neomycin-bacitracin-polymyxin  [bacitracin-neomycin-polymyxin]  Current Outpatient Medications   Medication Sig Dispense Refill    mometasone (ELOCON) 0.1 % cream Apply topically daily. 1 Tube 1    magnesium oxide (MAG-OX) 400 MG tablet TAKE 1 TABLET BY MOUTH EVERY DAY 90 tablet 3    levothyroxine (SYNTHROID) 75 MCG tablet Take 1 tablet by mouth Daily 90 tablet 3    hydroCHLOROthiazide (HYDRODIURIL) 25 MG tablet Take 1 tablet by mouth every morning 90 tablet 3    conjugated estrogens (PREMARIN) 0.625 MG/GM vaginal cream Use 0.5 g pv 2 to 3 times weekly. 3 Tube 3    montelukast (SINGULAIR) 10 MG tablet Take 1 tablet by mouth nightly 90 tablet 3    folic acid (FOLVITE) 1 MG tablet Take 1 tablet by mouth daily 90 tablet 3    desloratadine (CLARINEX) 5 MG tablet Take 1 tablet by mouth daily 90 tablet 3    WIXELA INHUB 100-50 MCG/DOSE diskus inhaler INHALE 1 PUFF AS INSTRUCTED TWICE DAILY. RINSE AND GARGLE MOUTH WITH WATER AFTER EACH USE 60 each 5    albuterol (PROVENTIL) (2.5 MG/3ML) 0.083% nebulizer solution Inhale by nebulizer over 5-15 minutes three (3) to four (4) times a day as needed for cough/wheezing/shortness of breath 120 each 5    FLUoxetine (PROZAC) 10 MG capsule  (Patient not taking: Reported on 7/19/2021)      albuterol sulfate  (90 Base) MCG/ACT inhaler Inhale 2 puffs into the lungs every 4 hours as needed for Wheezing or Shortness of Breath 1 Inhaler 1     No current facility-administered medications for this visit.      Social History     Socioeconomic History    Marital status:      Spouse name: Ada Narayanan Number of children: 0    Years of education: 23    Highest education level: Professional school degree (e.g., MD, FIONA, Roni Smith) Occupational History    Occupation: retired   Tobacco Use    Smoking status: Former Smoker     Packs/day: 0.25     Years: 5.00     Pack years: 1.25     Types: Cigarettes     Quit date: 1990     Years since quittin.0    Smokeless tobacco: Never Used   Vaping Use    Vaping Use: Never used   Substance and Sexual Activity    Alcohol use: Yes     Comment: occassionally    Drug use: Not Currently    Sexual activity: Yes     Partners: Male     Comment:  and monogamous   Other Topics Concern    Not on file   Social History Narrative    Not on file     Social Determinants of Health     Financial Resource Strain: Low Risk     Difficulty of Paying Living Expenses: Not very hard   Food Insecurity: No Food Insecurity    Worried About Running Out of Food in the Last Year: Never true    Ashvin of Food in the Last Year: Never true   Transportation Needs:     Lack of Transportation (Medical):      Lack of Transportation (Non-Medical):    Physical Activity:     Days of Exercise per Week:     Minutes of Exercise per Session:    Stress:     Feeling of Stress :    Social Connections:     Frequency of Communication with Friends and Family:     Frequency of Social Gatherings with Friends and Family:     Attends Congregation Services:     Active Member of Clubs or Organizations:     Attends Club or Organization Meetings:     Marital Status:    Intimate Partner Violence:     Fear of Current or Ex-Partner:     Emotionally Abused:     Physically Abused:     Sexually Abused:         Family History   Problem Relation Age of Onset    Anemia Mother     Asthma Mother     Obesity Mother     Alcohol Abuse Neg Hx     Allergy (Severe) Neg Hx     Arthritis Neg Hx     Arrhythmia Neg Hx     Atrial Fibrillation Neg Hx     Birth Defects Neg Hx     Breast Cancer Neg Hx     Cancer Neg Hx     Coronary Art Dis Neg Hx     Colon Cancer Neg Hx     Depression Neg Hx     Diabetes Neg Hx     Early Death Neg Hx     Hearing Loss Neg Hx     Heart Attack Neg Hx     Heart Disease Neg Hx     High Blood Pressure Neg Hx     High Cholesterol Neg Hx     Kidney Disease Neg Hx     Learning Disabilities Neg Hx     Mental Illness Neg Hx     Mental Retardation Neg Hx     Miscarriages / Stillbirths Neg Hx     Osteoporosis Neg Hx     Prostate Cancer Neg Hx     Stroke Neg Hx     Substance Abuse Neg Hx     Vision Loss Neg Hx        Review of Systems   Constitutional: Negative. Negative for activity change, appetite change, chills, diaphoresis, fatigue, fever and unexpected weight change. HENT: Negative. Eyes: Negative. Respiratory: Negative. Cardiovascular: Negative. Gastrointestinal: Negative for abdominal distention, abdominal pain, anal bleeding, blood in stool, constipation, diarrhea, nausea, rectal pain and vomiting. Endocrine: Negative. Genitourinary: Negative for decreased urine volume, difficulty urinating, dyspareunia, dysuria, enuresis, flank pain, frequency, genital sores, hematuria, menstrual problem, pelvic pain, urgency, vaginal bleeding, vaginal discharge and vaginal pain. Musculoskeletal: Negative. Skin: Negative. Allergic/Immunologic: Negative. Neurological: Negative. Hematological: Negative. Psychiatric/Behavioral: Negative. Objective:     Physical Exam  Constitutional:       General: She is not in acute distress. Appearance: She is well-developed. She is not diaphoretic. HENT:      Head: Normocephalic and atraumatic. Eyes:      Conjunctiva/sclera: Conjunctivae normal.   Cardiovascular:      Rate and Rhythm: Normal rate and regular rhythm. Pulmonary:      Effort: Pulmonary effort is normal. No respiratory distress. Musculoskeletal:         General: No tenderness or deformity. Normal range of motion. Cervical back: Normal range of motion and neck supple. Skin:     General: Skin is warm and dry. Coloration: Skin is not pale.    Neurological: Mental Status: She is alert and oriented to person, place, and time. Motor: No abnormal muscle tone. Coordination: Coordination normal.   Psychiatric:         Behavior: Behavior normal.         Thought Content: Thought content normal.         Judgment: Judgment normal.         Assessment:          Diagnosis Orders   1. Adnexal mass  MRI PELVIS W WO CONTRAST    Ca 125        Plan:      Medications placedthis encounter:  No orders of the defined types were placed in this encounter. Orders placedthis encounter:  Orders Placed This Encounter   Procedures    MRI PELVIS W WO CONTRAST     Standing Status:   Future     Standing Expiration Date:   7/20/2022    Ca 125     Standing Status:   Future     Standing Expiration Date:   7/20/2022         Follow up:  Return for MRI Pelvis, Results Review.

## 2021-07-21 ENCOUNTER — TELEPHONE (OUTPATIENT)
Dept: PRIMARY CARE CLINIC | Age: 56
End: 2021-07-21

## 2021-07-21 DIAGNOSIS — N17.9 AKI (ACUTE KIDNEY INJURY) (HCC): ICD-10-CM

## 2021-07-21 DIAGNOSIS — E83.52 HYPERCALCEMIA: Primary | ICD-10-CM

## 2021-07-21 LAB
MISCELLANEOUS LAB TEST ORDER: ABNORMAL
REASON FOR REJECTION: NORMAL
REJECTED TEST: NORMAL
WHOPPER PROMPT: ABNORMAL

## 2021-07-21 NOTE — TELEPHONE ENCOUNTER
----- Message from Bob Hammond sent at 7/20/2021  7:00 PM EDT -----  Subject: Message to Provider     QUESTIONS  Information for Provider? Pt would like to discuss her lab results for   liver. Please call as soon as possible.  ---------------------------------------------------------------------------  --------------  CALL BACK INFO  What is the best way for the office to contact you? OK to leave message on   voicemail  Preferred Call Back Phone Number? [phone]  ---------------------------------------------------------------------------  --------------  SCRIPT ANSWERS  Relationship to Patient?  Self

## 2021-07-21 NOTE — TELEPHONE ENCOUNTER
----- Message from Rajwinder Cleaning sent at 7/20/2021  7:00 PM EDT -----  Subject: Message to Provider    QUESTIONS  Information for Provider? Pt would like to discuss her lab results for   liver. Please call as soon as possible.  ---------------------------------------------------------------------------  --------------  CALL BACK INFO  What is the best way for the office to contact you? OK to leave message on   voicemail  Preferred Call Back Phone Number? [phone]  ---------------------------------------------------------------------------  --------------  SCRIPT ANSWERS  Relationship to Patient?  Self

## 2021-07-22 LAB — MITOCHONDRIAL M2 AB, IGG: 1.5 U/ML (ref 0–4)

## 2021-07-23 ENCOUNTER — TELEPHONE (OUTPATIENT)
Dept: PRIMARY CARE CLINIC | Age: 56
End: 2021-07-23

## 2021-07-23 DIAGNOSIS — R74.8 ELEVATED LIVER ENZYMES: ICD-10-CM

## 2021-07-23 DIAGNOSIS — R16.0 LIVER MASS: Primary | ICD-10-CM

## 2021-07-23 NOTE — TELEPHONE ENCOUNTER
She is asking if you can give her a call regarding some health questions she has for you? Her # is [phone].

## 2021-07-26 ENCOUNTER — OFFICE VISIT (OUTPATIENT)
Dept: GASTROENTEROLOGY | Age: 56
End: 2021-07-26
Payer: COMMERCIAL

## 2021-07-26 DIAGNOSIS — K70.30 ALCOHOLIC CIRRHOSIS OF LIVER WITHOUT ASCITES (HCC): Primary | ICD-10-CM

## 2021-07-26 PROCEDURE — 99204 OFFICE O/P NEW MOD 45 MIN: CPT | Performed by: INTERNAL MEDICINE

## 2021-07-26 ASSESSMENT — ENCOUNTER SYMPTOMS
PHOTOPHOBIA: 0
WHEEZING: 0
VOMITING: 0
VOICE CHANGE: 0
CONSTIPATION: 0
BLOOD IN STOOL: 0
NAUSEA: 0
ABDOMINAL DISTENTION: 0
TROUBLE SWALLOWING: 0
ABDOMINAL PAIN: 0
RECTAL PAIN: 0
EYE REDNESS: 0
EYE PAIN: 0
SHORTNESS OF BREATH: 0
CHEST TIGHTNESS: 0
COLOR CHANGE: 0
DIARRHEA: 0

## 2021-07-26 NOTE — PROGRESS NOTES
Subjective:      Patient ID: Luz Rojas. Jose Miguel Leonard is a 49 y.o. female who presents today for:  Chief Complaint   Patient presents with    Cirrhosis       HPI  This is a very pleasant 61-year-old who came in today for the evaluation management of cirrhosis. Etiology of cirrhosis thought to be related to alcohol. Patient was diagnosed with cirrhosis on previous imaging almost 2 years ago she has been abstinent alcohol of over the last 2 months. Patient denies any history of hematemesis melena hematochezia no previous study of esophageal variceal bleed. Patient mentioned that she had previous EGD long time ago following the diagnosis of food impaction. Patient denies increased abdominal girth though she reports that she gained 10 pounds over the last few weeks. Denies any hospitalization related to confusion change in mental status/liver condition. Denies any jaundice.   Patient mentioned that she is about to have imaging of her liver MRI abdomen  Past Medical History:   Diagnosis Date    Allergic rhinitis     Allergies     Asthma     Closed dislocation of finger of left hand 2018    Dermoid tumor     Essential hypertension 5/15/2018    Hyperthyroidism     Thyroid disease      Past Surgical History:   Procedure Laterality Date    LASIK Bilateral 2021    MANDIBLE SURGERY       Social History     Socioeconomic History    Marital status:      Spouse name: Justyn Floyd Number of children: 0    Years of education: 23    Highest education level: Professional school degree (e.g., MD, DDS, DVM, VIVIENNE)   Occupational History    Occupation: retired   Tobacco Use    Smoking status: Former Smoker     Packs/day: 0.25     Years: 5.00     Pack years: 1.25     Types: Cigarettes     Quit date: 1990     Years since quittin.0    Smokeless tobacco: Never Used   Vaping Use    Vaping Use: Never used   Substance and Sexual Activity    Alcohol use: Yes     Comment: occassionally    Drug use: Not Currently    Sexual activity: Yes     Partners: Male     Comment:  and monogamous   Other Topics Concern    Not on file   Social History Narrative    Not on file     Social Determinants of Health     Financial Resource Strain: Low Risk     Difficulty of Paying Living Expenses: Not very hard   Food Insecurity: No Food Insecurity    Worried About Running Out of Food in the Last Year: Never true    Ashvin of Food in the Last Year: Never true   Transportation Needs:     Lack of Transportation (Medical):      Lack of Transportation (Non-Medical):    Physical Activity:     Days of Exercise per Week:     Minutes of Exercise per Session:    Stress:     Feeling of Stress :    Social Connections:     Frequency of Communication with Friends and Family:     Frequency of Social Gatherings with Friends and Family:     Attends [employer] Services:     Active Member of Clubs or Organizations:     Attends Club or Organization Meetings:     Marital Status:    Intimate Partner Violence:     Fear of Current or Ex-Partner:     Emotionally Abused:     Physically Abused:     Sexually Abused:      Family History   Problem Relation Age of Onset    Anemia Mother     Asthma Mother     Obesity Mother     Alcohol Abuse Neg Hx     Allergy (Severe) Neg Hx     Arthritis Neg Hx     Arrhythmia Neg Hx     Atrial Fibrillation Neg Hx     Birth Defects Neg Hx     Breast Cancer Neg Hx     Cancer Neg Hx     Coronary Art Dis Neg Hx     Colon Cancer Neg Hx     Depression Neg Hx     Diabetes Neg Hx     Early Death Neg Hx     Hearing Loss Neg Hx     Heart Attack Neg Hx     Heart Disease Neg Hx     High Blood Pressure Neg Hx     High Cholesterol Neg Hx     Kidney Disease Neg Hx     Learning Disabilities Neg Hx     Mental Illness Neg Hx     Mental Retardation Neg Hx     Miscarriages / Stillbirths Neg Hx     Osteoporosis Neg Hx     Prostate Cancer Neg Hx     Stroke Neg Hx     Substance Abuse Neg Hx     Vision Loss Neg Hx      Allergies   Allergen Reactions    Neomycin-Bacitracin-Polymyxin  [Bacitracin-Neomycin-Polymyxin] Rash         Review of Systems   Constitutional: Negative for appetite change, chills, fatigue, fever and unexpected weight change. HENT: Negative for nosebleeds, tinnitus, trouble swallowing and voice change. Eyes: Negative for photophobia, pain and redness. Respiratory: Negative for chest tightness, shortness of breath and wheezing. Cardiovascular: Negative for chest pain, palpitations and leg swelling. Gastrointestinal: Negative for abdominal distention, abdominal pain, blood in stool, constipation, diarrhea, nausea, rectal pain and vomiting. Endocrine: Negative for polydipsia, polyphagia and polyuria. Genitourinary: Negative for difficulty urinating and hematuria. Skin: Negative for color change, pallor and rash. Neurological: Negative for dizziness, speech difficulty and headaches. Psychiatric/Behavioral: Negative for confusion and suicidal ideas. Objective:   LMP  (LMP Unknown)     Physical Exam  Constitutional:       General: She is not in acute distress. Appearance: She is well-developed. HENT:      Head: Normocephalic and atraumatic. Eyes:      Conjunctiva/sclera: Conjunctivae normal.      Pupils: Pupils are equal, round, and reactive to light. Cardiovascular:      Rate and Rhythm: Normal rate and regular rhythm. Heart sounds: Normal heart sounds. Pulmonary:      Effort: Pulmonary effort is normal. No respiratory distress. Breath sounds: Normal breath sounds. No wheezing or rales. Abdominal:      General: Bowel sounds are normal. There is no distension. Palpations: Abdomen is soft. Abdomen is not rigid. There is no hepatomegaly, splenomegaly or mass. Tenderness: There is no abdominal tenderness. There is no guarding or rebound. Musculoskeletal:         General: No tenderness or deformity. Normal range of motion. Cervical back: Neck supple. Skin:     Coloration: Skin is not pale. Findings: No erythema or rash. Neurological:      Mental Status: She is alert and oriented to person, place, and time. Laboratory, Pathology, Radiology reviewed in detail with relevantimportant investigations summarized below:  Lab Results   Component Value Date    WBC 4.7 12/10/2019    HGB 14.1 12/10/2019    HCT 41.8 12/10/2019    MCV 94.6 12/10/2019     12/10/2019    . Lab Results   Component Value Date    ALT 14 07/19/2021    ALT 39 11/23/2020    ALT 23 01/14/2020    AST 21 07/19/2021    AST 78 11/23/2020    AST 46 01/14/2020     07/19/2021    ALKPHOS 82 07/19/2021    ALKPHOS 171 11/23/2020    ALKPHOS 175 01/14/2020    BILITOT 2.3 07/19/2021    BILITOT 1.0 11/23/2020    BILITOT 1.1 01/14/2020       US NON OB TRANSVAGINAL    Result Date: 7/15/2021  EXAMINATION: US PELVIS COMPLETE, US NON OB TRANSVAGINAL DATE AND TIME:7/14/2021 8:24 AM CLINICAL HISTORY: Pelvic pain  N94.89 Adnexal mass ICD10 COMPARISON: November 4, 2020 TECHNIQUE:Transabdominal and transvaginal. Transvaginal scans provided for more accurate assessment of the uterus and adnexa. FINDINGS: Uterus is midline uterus measures 7.3 cm. There is an IUD device in the midline endometrium in proper position. Optimal assessment of the endometrium is otherwise limited. No focal uterine mass. Within the right adnexa there is a prominent 5 x 5.3 cm ovarian cyst with low-level echoes and peripheral nodularity similar in appearance to the previous ultrasound. Portions of the inferior margin are ill-defined. The possibility of ovarian dermoid is not excluded. This could be further characterized with CT if clinically warranted. There is good flow to the right ovary. Left ovary was not able to be visualized. PERSISTENT 5.3 CM COMPLEX CYSTIC MASS IN THE RIGHT ADNEXA WITH MURAL NODULARITY. LESION IS STABLE. TUMOR NOT EXCLUDED.  THE POSSIBILITY OF OVARIAN DERMOID IS RAISED. THIS COULD BE BETTER CHARACTERIZED WITH CT OR MRI IF CLINICALLY WARRANTED. US PELVIS COMPLETE    Result Date: 7/15/2021  EXAMINATION: US PELVIS COMPLETE, US NON OB TRANSVAGINAL DATE AND TIME:7/14/2021 8:24 AM CLINICAL HISTORY: Pelvic pain  N94.89 Adnexal mass ICD10 COMPARISON: November 4, 2020 TECHNIQUE:Transabdominal and transvaginal. Transvaginal scans provided for more accurate assessment of the uterus and adnexa. FINDINGS: Uterus is midline uterus measures 7.3 cm. There is an IUD device in the midline endometrium in proper position. Optimal assessment of the endometrium is otherwise limited. No focal uterine mass. Within the right adnexa there is a prominent 5 x 5.3 cm ovarian cyst with low-level echoes and peripheral nodularity similar in appearance to the previous ultrasound. Portions of the inferior margin are ill-defined. The possibility of ovarian dermoid is not excluded. This could be further characterized with CT if clinically warranted. There is good flow to the right ovary. Left ovary was not able to be visualized. PERSISTENT 5.3 CM COMPLEX CYSTIC MASS IN THE RIGHT ADNEXA WITH MURAL NODULARITY. LESION IS STABLE. TUMOR NOT EXCLUDED. THE POSSIBILITY OF OVARIAN DERMOID IS RAISED. THIS COULD BE BETTER CHARACTERIZED WITH CT OR MRI IF CLINICALLY WARRANTED. [facility] DIGITAL SCREEN W OR WO CAD BILATERAL    Result Date: 7/15/2021  EXAMINATION: [facility] DIGITAL SCREEN W OR WO CAD BILATERAL   CLINICAL HISTORY:Z12.31 SCREENING MAMMOGRAM, ENCOUNTER FOR ICD10 COMPARISON: DECEMBER 26, 2019 FINDINGS:Bilateral digital mammography was performed. There are scattered fibroglandular densities within each breast.   There are no suspicious masses, areas of architectural distortion or suspicious areas of microcalcifications. CAD analysis was performed and used in the interpretation. BI-RADS 2: BENIGN FINDINGS. ROUTINE FOLLOW-UP MAMMOGRAPHY IS SUGGESTED IN ONE YEAR.  There are scattered fibroglandular densities within each breast.   Board Certified Radiologists. Accredited by the ACR and FDA. MAMMOGRAPHY IS VERY IMPORTANT TO YOUR HEALTH. THE [employer] SOCIET[employer] GUIDELINES RECOMMEND THAT WOMEN 36YEARS OF AGE AND OLDER SHOULD HAVE A MAMMOGRAM EVERY YEAR. A REMINDER LETTER WILL BE SENT AT THE APPROPRIATE TIME.  THIS FACILITY UTILIZES A REMINDER SYSTEM TO ENSURE ALL PATIENTS RECEIVE REMINDER NOTIFICATIONS AT THE APPROPRIATE TIME BASED ON THE RECOMMENDATIONS OF THIS EXAM. THIS INCLUDES REMINDERS FOR ROUTINE  SCREENING MAMMOGRAMS, DIAGNOSTIC MAMMOGRAMS IN WHICH THE PATIENT IS ASKED TO RETURN FOR ADDITIONAL VIEWS, OR OTHER BREAST IMAGING INTERVENTIONS WHEN APPROPRIATE. THE PATIENT WILL BE PLACED IN THE APPROPRIATE REMINDER SYSTEM INCLUDING A REMINDER AT THE APPROPRIATE TIME FOR ANY PENDING ADDITIONAL VIEWS. 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 Last alcohol use was 2 months prior to current visit  2 -  Ascites:   Not significant clinically. 3- EGD for Variceal surveillance: We will proceed with upper endoscopy to assess for esophageal varices  4 - HCC screening:   Patient scheduled for MRI  5-  Overt Hepatic encephalopathy:   None at this time  6- Nutrition :   Continue multivitamin / Nutritional  support   7- Preventive measure:   - Check Hep A and Hep B immunity, Flu and pneumonia vaccinations   8-Associated medical conditions  Ovarian? Cystic mass for which patient is currently undergoing evaluation    Return in about 4 weeks (around 8/23/2021) for Post procedure results discussion, further management.       Cameron Powers MD

## 2021-07-29 ENCOUNTER — HOSPITAL ENCOUNTER (OUTPATIENT)
Dept: LAB | Age: 56
Discharge: HOME | End: 2021-07-29
Payer: COMMERCIAL

## 2021-07-29 ENCOUNTER — TELEPHONE (OUTPATIENT)
Dept: PRIMARY CARE CLINIC | Age: 56
End: 2021-07-29

## 2021-07-29 ENCOUNTER — HOSPITAL ENCOUNTER (OUTPATIENT)
Dept: MRI IMAGING | Age: 56
Discharge: HOME | End: 2021-07-31
Payer: COMMERCIAL

## 2021-07-29 DIAGNOSIS — R16.0 LIVER MASS: ICD-10-CM

## 2021-07-29 DIAGNOSIS — R79.89 LFT ELEVATION: ICD-10-CM

## 2021-07-29 DIAGNOSIS — E87.6 HYPOKALEMIA: Primary | ICD-10-CM

## 2021-07-29 DIAGNOSIS — N94.89 ADNEXAL MASS: ICD-10-CM

## 2021-07-29 LAB
ALBUMIN SERPL-MCNC: 4.7 G/DL (ref 3.5–4.6)
ALP BLD-CCNC: 74 U/L (ref 40–130)
ALT SERPL-CCNC: 12 U/L (ref 0–33)
ANION GAP SERPL CALCULATED.3IONS-SCNC: 15 MEQ/L (ref 9–15)
AST SERPL-CCNC: 19 U/L (ref 0–35)
BILIRUB SERPL-MCNC: 1.5 MG/DL (ref 0.2–0.7)
BUN BLDV-MCNC: 15 MG/DL (ref 6–20)
CALCIUM SERPL-MCNC: 10 MG/DL (ref 8.5–9.9)
CHLORIDE BLD-SCNC: 86 MEQ/L (ref 95–107)
CO2: 29 MEQ/L (ref 20–31)
CREAT SERPL-MCNC: 0.93 MG/DL (ref 0.5–0.9)
GFR AFRICAN AMERICAN: >60
GFR NON-AFRICAN AMERICAN: >60
GLOBULIN: 2.8 G/DL (ref 2.3–3.5)
GLUCOSE BLD-MCNC: 88 MG/DL (ref 70–99)
POTASSIUM SERPL-SCNC: 2.8 MEQ/L (ref 3.4–4.9)
SODIUM BLD-SCNC: 130 MEQ/L (ref 135–144)
TOTAL PROTEIN: 7.5 G/DL (ref 6.3–8)

## 2021-07-29 PROCEDURE — 72197 MRI PELVIS W/O & W/DYE: CPT

## 2021-07-29 PROCEDURE — 74183 MRI ABD W/O CNTR FLWD CNTR: CPT

## 2021-07-29 PROCEDURE — 6360000004 HC RX CONTRAST MEDICATION: Performed by: INTERNAL MEDICINE

## 2021-07-29 PROCEDURE — A9577 INJ MULTIHANCE: HCPCS | Performed by: INTERNAL MEDICINE

## 2021-07-29 PROCEDURE — 80053 COMPREHEN METABOLIC PANEL: CPT

## 2021-07-29 RX ORDER — POTASSIUM CHLORIDE 20 MEQ/1
20 TABLET, EXTENDED RELEASE ORAL DAILY
Qty: 10 TABLET | Refills: 1 | Status: SHIPPED | OUTPATIENT
Start: 2021-07-29 | End: 2021-08-20 | Stop reason: SDUPTHER

## 2021-07-29 RX ADMIN — GADOBENATE DIMEGLUMINE 20 ML: 529 INJECTION, SOLUTION INTRAVENOUS at 09:15

## 2021-07-30 ENCOUNTER — TELEPHONE (OUTPATIENT)
Dept: OBGYN CLINIC | Age: 56
End: 2021-07-30

## 2021-08-02 ENCOUNTER — OFFICE VISIT (OUTPATIENT)
Dept: OBGYN CLINIC | Age: 56
End: 2021-08-02
Payer: COMMERCIAL

## 2021-08-02 VITALS
HEIGHT: 68 IN | BODY MASS INDEX: 27.28 KG/M2 | DIASTOLIC BLOOD PRESSURE: 70 MMHG | WEIGHT: 180 LBS | SYSTOLIC BLOOD PRESSURE: 132 MMHG

## 2021-08-02 DIAGNOSIS — Z09 FOLLOW UP: Primary | ICD-10-CM

## 2021-08-02 DIAGNOSIS — N94.89 ADNEXAL MASS: ICD-10-CM

## 2021-08-02 PROCEDURE — 99212 OFFICE O/P EST SF 10 MIN: CPT | Performed by: OBSTETRICS & GYNECOLOGY

## 2021-08-02 ASSESSMENT — ENCOUNTER SYMPTOMS
ALLERGIC/IMMUNOLOGIC NEGATIVE: 1
DIARRHEA: 0
ABDOMINAL DISTENTION: 0
BLOOD IN STOOL: 0
EYES NEGATIVE: 1
RESPIRATORY NEGATIVE: 1
NAUSEA: 0
ANAL BLEEDING: 0
CONSTIPATION: 0
VOMITING: 0
RECTAL PAIN: 0
ABDOMINAL PAIN: 0

## 2021-08-02 NOTE — PROGRESS NOTES
Patient here to discuss MRI for right adnexal mass. MRI as follows:    EXAMINATION: MRI PELVIS W WO CONTRAST       DATE AND TIME:7/29/2021 7:45 AM       CLINICAL HISTORY: N94.89 Adnexal mass ICD10        COMPARISON: None       TECHNIQUE:Multiplanar, multi-pulse sequence MRI of the pelvis. . 20 cc of MultiHance contrast administered intravenously.       Uterus:         Position: Anteverted. No focal uterine mass.                  Size: 7 x 5.2 x 3.3 cm.              Endometrium: 3 mm              Mass: In the right adnexal area there is a complex mass measuring 9.3 x 4.6 x 5.1 cm. This contains a mixture of soft tissue density and fat consistent with a dermoid. Left ovary appears grossly normal.        Other findings:  Urinary bladder is normal. There is no free fluid. Organs of the pelvis are otherwise unremarkable.           Impression   9.3 X 4.6 X 5.1 CM RIGHT ADNEXAL MASS CONSISTENT WITH AN OVARIAN DERMOID TUMOR.              Discussed large right dermoid tumor on MRI. Discussed surgical removal with risks and benefits. Patient opts for removal.  Referred to  [facility] for surgical consultation. All questions answered. Vitals:  /70   Ht 5' 8\" (1.727 m)   Wt 180 lb (81.6 kg)   LMP  (LMP Unknown)   BMI 27.37 kg/m²   Past Medical History:   Diagnosis Date    Allergic rhinitis     Allergies     Asthma     Closed dislocation of finger of left hand 5/4/2018    Dermoid tumor     Essential hypertension 5/15/2018    Hyperthyroidism     Thyroid disease      Past Surgical History:   Procedure Laterality Date    LASIK Bilateral 05/2021    MANDIBLE SURGERY       Allergies:  Neomycin-bacitracin-polymyxin  [bacitracin-neomycin-polymyxin]  Current Outpatient Medications   Medication Sig Dispense Refill    potassium chloride (KLOR-CON M) 20 MEQ extended release tablet Take 1 tablet by mouth daily 1 pill qd po for 3 days, then 1 pill twice a week.  10 tablet 1    mometasone (ELOCON) 0.1 % cream Apply topically daily. 1 Tube 1    magnesium oxide (MAG-OX) 400 MG tablet TAKE 1 TABLET BY MOUTH EVERY DAY 90 tablet 3    levothyroxine (SYNTHROID) 75 MCG tablet Take 1 tablet by mouth Daily 90 tablet 3    hydroCHLOROthiazide (HYDRODIURIL) 25 MG tablet Take 1 tablet by mouth every morning 90 tablet 3    conjugated estrogens (PREMARIN) 0.625 MG/GM vaginal cream Use 0.5 g pv 2 to 3 times weekly. 3 Tube 3    montelukast (SINGULAIR) 10 MG tablet Take 1 tablet by mouth nightly 90 tablet 3    folic acid (FOLVITE) 1 MG tablet Take 1 tablet by mouth daily 90 tablet 3    desloratadine (CLARINEX) 5 MG tablet Take 1 tablet by mouth daily 90 tablet 3    WIXELA INHUB 100-50 MCG/DOSE diskus inhaler INHALE 1 PUFF AS INSTRUCTED TWICE DAILY. RINSE AND GARGLE MOUTH WITH WATER AFTER EACH USE 60 each 5    albuterol (PROVENTIL) (2.5 MG/3ML) 0.083% nebulizer solution Inhale by nebulizer over 5-15 minutes three (3) to four (4) times a day as needed for cough/wheezing/shortness of breath 120 each 5    FLUoxetine (PROZAC) 10 MG capsule  (Patient not taking: Reported on 2021)      albuterol sulfate  (90 Base) MCG/ACT inhaler Inhale 2 puffs into the lungs every 4 hours as needed for Wheezing or Shortness of Breath 1 Inhaler 1     No current facility-administered medications for this visit.      Social History     Socioeconomic History    Marital status:      Spouse name: Shaheed Mcintyre Number of children: 0    Years of education: 23    Highest education level: Professional school degree (e.g., MD, DDS, DVM, VIVIENNE)   Occupational History    Occupation: retired   Tobacco Use    Smoking status: Former Smoker     Packs/day: 0.25     Years: 5.00     Pack years: 1.25     Types: Cigarettes     Quit date: 1990     Years since quittin.0    Smokeless tobacco: Never Used   Vaping Use    Vaping Use: Never used   Substance and Sexual Activity    Alcohol use: Yes     Comment: occassionally    Drug use: Not Currently    Sexual activity: Yes     Partners: Male     Comment:  and monogamous   Other Topics Concern    Not on file   Social History Narrative    Not on file     Social Determinants of Health     Financial Resource Strain: Low Risk     Difficulty of Paying Living Expenses: Not very hard   Food Insecurity: No Food Insecurity    Worried About Running Out of Food in the Last Year: Never true    Ashvin of Food in the Last Year: Never true   Transportation Needs:     Lack of Transportation (Medical):      Lack of Transportation (Non-Medical):    Physical Activity:     Days of Exercise per Week:     Minutes of Exercise per Session:    Stress:     Feeling of Stress :    Social Connections:     Frequency of Communication with Friends and Family:     Frequency of Social Gatherings with Friends and Family:     Attends Tenri[employer] Services:     Active Member of Clubs or Organizations:     Attends Club or Organization Meetings:     Marital Status:    Intimate Partner Violence:     Fear of Current or Ex-Partner:     Emotionally Abused:     Physically Abused:     Sexually Abused:         Family History   Problem Relation Age of Onset    Anemia Mother     Asthma Mother     Obesity Mother     Alcohol Abuse Neg Hx     Allergy (Severe) Neg Hx     Arthritis Neg Hx     Arrhythmia Neg Hx     Atrial Fibrillation Neg Hx     Birth Defects Neg Hx     Breast Cancer Neg Hx     Cancer Neg Hx     Coronary Art Dis Neg Hx     Colon Cancer Neg Hx     Depression Neg Hx     Diabetes Neg Hx     Early Death Neg Hx     Hearing Loss Neg Hx     Heart Attack Neg Hx     Heart Disease Neg Hx     High Blood Pressure Neg Hx     High Cholesterol Neg Hx     Kidney Disease Neg Hx     Learning Disabilities Neg Hx     Mental Illness Neg Hx     Mental Retardation Neg Hx     Miscarriages / Stillbirths Neg Hx     Osteoporosis Neg Hx     Prostate Cancer Neg Hx     Stroke Neg Hx     Substance Abuse Neg Hx     Vision Loss Neg Hx        Review of Systems   Constitutional: Negative. Negative for activity change, appetite change, chills, diaphoresis, fatigue, fever and unexpected weight change. HENT: Negative. Eyes: Negative. Respiratory: Negative. Cardiovascular: Negative. Gastrointestinal: Negative for abdominal distention, abdominal pain, anal bleeding, blood in stool, constipation, diarrhea, nausea, rectal pain and vomiting. Endocrine: Negative. Genitourinary: Negative for decreased urine volume, difficulty urinating, dyspareunia, dysuria, enuresis, flank pain, frequency, genital sores, hematuria, menstrual problem, pelvic pain, urgency, vaginal bleeding, vaginal discharge and vaginal pain. Musculoskeletal: Negative. Skin: Negative. Allergic/Immunologic: Negative. Neurological: Negative. Hematological: Negative. Psychiatric/Behavioral: Negative. Objective:     Physical Exam  Constitutional:       General: She is not in acute distress. Appearance: She is well-developed. She is not diaphoretic. HENT:      Head: Normocephalic and atraumatic. Eyes:      Conjunctiva/sclera: Conjunctivae normal.   Cardiovascular:      Rate and Rhythm: Normal rate and regular rhythm. Pulmonary:      Effort: Pulmonary effort is normal. No respiratory distress. Musculoskeletal:         General: No tenderness or deformity. Normal range of motion. Cervical back: Normal range of motion and neck supple. Skin:     General: Skin is warm and dry. Coloration: Skin is not pale. Neurological:      Mental Status: She is alert and oriented to person, place, and time. Motor: No abnormal muscle tone. Coordination: Coordination normal.   Psychiatric:         Behavior: Behavior normal.         Thought Content: Thought content normal.         Judgment: Judgment normal.         Assessment:          Diagnosis Orders   1. Follow up     2.  Adnexal mass          Plan:      Medications placedthis encounter:  No orders of the defined types were placed in this encounter. Orders placedthis encounter:  No orders of the defined types were placed in this encounter. Follow up:  Return for Consult  [facility] ( Surgery Consult ).

## 2021-08-11 ENCOUNTER — PATIENT MESSAGE (OUTPATIENT)
Dept: GASTROENTEROLOGY | Age: 56
End: 2021-08-11

## 2021-08-11 NOTE — TELEPHONE ENCOUNTER
From: Iris Duverney. [place]  To: Eduard Hernandez MD  Sent: 8/11/2021 9:08 AM EDT  Subject: Non-Urgent Medical Question    Good morning! I hope you all are well and that the power is working. :) I got the letter saying I needed more bloodwork. I did get blood drawn when I got the MRIs on 7/29. Do I need to get another blood draw before I come in on 8/24? Thank you and have a great day!  Mimi

## 2021-08-19 DIAGNOSIS — E87.6 HYPOKALEMIA: Primary | ICD-10-CM

## 2021-08-19 DIAGNOSIS — E87.6 HYPOKALEMIA: ICD-10-CM

## 2021-08-20 RX ORDER — POTASSIUM CHLORIDE 20 MEQ/1
20 TABLET, EXTENDED RELEASE ORAL DAILY
Qty: 30 TABLET | Refills: 2 | Status: SHIPPED | OUTPATIENT
Start: 2021-08-20 | End: 2021-08-26 | Stop reason: SDUPTHER

## 2021-08-24 ENCOUNTER — OFFICE VISIT (OUTPATIENT)
Dept: GASTROENTEROLOGY | Age: 56
End: 2021-08-24
Payer: COMMERCIAL

## 2021-08-24 ENCOUNTER — OFFICE VISIT (OUTPATIENT)
Dept: OBGYN CLINIC | Age: 56
End: 2021-08-24
Payer: COMMERCIAL

## 2021-08-24 VITALS
WEIGHT: 179 LBS | DIASTOLIC BLOOD PRESSURE: 72 MMHG | HEIGHT: 68 IN | HEART RATE: 72 BPM | SYSTOLIC BLOOD PRESSURE: 110 MMHG | BODY MASS INDEX: 27.13 KG/M2

## 2021-08-24 VITALS
SYSTOLIC BLOOD PRESSURE: 130 MMHG | DIASTOLIC BLOOD PRESSURE: 70 MMHG | BODY MASS INDEX: 27.22 KG/M2 | HEART RATE: 83 BPM | WEIGHT: 179 LBS | OXYGEN SATURATION: 98 %

## 2021-08-24 DIAGNOSIS — R19.00 PELVIC MASS: Primary | ICD-10-CM

## 2021-08-24 DIAGNOSIS — K74.60 CIRRHOSIS OF LIVER WITHOUT ASCITES, UNSPECIFIED HEPATIC CIRRHOSIS TYPE (HCC): Primary | ICD-10-CM

## 2021-08-24 PROCEDURE — 99214 OFFICE O/P EST MOD 30 MIN: CPT | Performed by: OBSTETRICS & GYNECOLOGY

## 2021-08-24 PROCEDURE — 99215 OFFICE O/P EST HI 40 MIN: CPT | Performed by: INTERNAL MEDICINE

## 2021-08-24 RX ORDER — POLYETHYLENE GLYCOL 3350 17 G/17G
POWDER, FOR SOLUTION ORAL
Qty: 1020 G | Refills: 0 | Status: SHIPPED | OUTPATIENT
Start: 2021-08-24 | End: 2021-11-16 | Stop reason: ALTCHOICE

## 2021-08-24 ASSESSMENT — ENCOUNTER SYMPTOMS
RECTAL PAIN: 0
VOMITING: 0
EYE REDNESS: 0
ABDOMINAL PAIN: 0
PHOTOPHOBIA: 0
VOICE CHANGE: 0
CHEST TIGHTNESS: 0
NAUSEA: 0
SHORTNESS OF BREATH: 0
WHEEZING: 0
COLOR CHANGE: 0
CONSTIPATION: 0
EYE PAIN: 0
TROUBLE SWALLOWING: 0
ABDOMINAL DISTENTION: 0
BLOOD IN STOOL: 0
DIARRHEA: 0

## 2021-08-24 NOTE — PROGRESS NOTES
education: 23    Highest education level: Professional school degree (e.g., MD, KAYYS, DVM, VIVIENNE)   Occupational History    Occupation: retired   Tobacco Use    Smoking status: Former Smoker     Packs/day: 0.25     Years: 5.00     Pack years: 1.25     Types: Cigarettes     Quit date: 1990     Years since quittin.1    Smokeless tobacco: Never Used   Vaping Use    Vaping Use: Never used   Substance and Sexual Activity    Alcohol use: Yes     Comment: occassionally    Drug use: Not Currently    Sexual activity: Yes     Partners: Male     Comment:  and monogamous   Other Topics Concern    Not on file   Social History Narrative    Not on file     Social Determinants of Health     Financial Resource Strain: Low Risk     Difficulty of Paying Living Expenses: Not very hard   Food Insecurity: No Food Insecurity    Worried About Running Out of Food in the Last Year: Never true    Ashvin of Food in the Last Year: Never true   Transportation Needs:     Lack of Transportation (Medical):  Lack of Transportation (Non-Medical):    Physical Activity:     Days of Exercise per Week:     Minutes of Exercise per Session:    Stress:     Feeling of Stress :    Social Connections:     Frequency of Communication with Friends and Family:     Frequency of Social Gatherings with Friends and Family:     Attends [employer] Services:     Active Member of Clubs or Organizations:     Attends Club or Organization Meetings:     Marital Status:    Intimate Partner Violence:     Fear of Current or Ex-Partner:     Emotionally Abused:     Physically Abused:     Sexually Abused:        Contraceptive method:  none    Patient's medications, allergies, past medical, surgical, social and family histories were reviewed and updated as appropriate. Review of Systems  As per chief complaint   All other systems reviewed and are negative.     Physical Exam:  Vitals:  /72 (Site: Left Upper Arm, Position: Sitting, Cuff Size: Medium Adult)   Pulse 72   Ht 5' 8\" (1.727 m)   Wt 179 lb (81.2 kg)   LMP  (LMP Unknown)   BMI 27.22 kg/m²   Lungs: CTAB   Heart : Regular S1/S2, no M/R/G  Abdomen: Soft , NT, ND , + BS   Pelvic exam : deferred    Assessment:      Diagnosis Orders   1. Pelvic mass         Plan: For laparoscopic pelvic mass removal and possible right oophorectomy. Counseling: The patient was counseled on all options both medical and surgical, conservative as well as definitive. She has elected to proceed with the procedure as stated above. The patient was counseled on the procedure. Risks and complications were reviewed in detail. The patients orders, labs, consents have been completed. The history and physical as well as all supporting surgical documentation will be forwarded to the pre-operative holding area. The patient is aware that this procedure may not alleviate her symptoms. That there may be a necessity for a second surgery and that there may be an incomplete removal of abnormal tissue. Discussed all risks and benefits of procedure in detail including risks of anesthesia, blood loss, need for transfusion, infection;  also potential for complication, injury, need for repair and/or removal of uterus, tubes, ovaries, bowel, bladder, ureters, blood vessels and nerves. Also discussed pre-operative and post-operative expectations. Patient verbalizes understanding. Patient was seen with total face to face time of 30 minutes. More than 50% of this visit was counseling and education regarding The encounter diagnosis was Pelvic mass. and Surgical Consult (Referred by Dr. Ashley Roman.)   as well as  counseling on preventative health maintenance follow-up. No orders of the defined types were placed in this encounter.     Orders Placed This Encounter   Medications    polyethylene glycol (MIRALAX) 17 GM/SCOOP powder     Sig: Use ONE CAPEFUL at 10 am and then 2 pm on day prior to procedure Dispense:  1020 g     Refill:  0       Follow Up:  No follow-ups on file.         Yesi Gruber MD

## 2021-08-24 NOTE — PROGRESS NOTES
Subjective:      Patient ID: Bob Quinones. Ren Sierra is a 49 y.o. female who presents today for:  Chief Complaint   Patient presents with    Cirrhosis       HPI   Patient came in today for follow-up visit. Since the initial diagnosis patient has been abstinent from alcohol with last alcohol use was in May 2021. Patient also been following with gynecology team regarding right adnexal mass. 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 Denies any increased abdominal girth. Denies any hematemesis melena hematochezia. No recent EGD. Had previous EGD years ago following food impaction. No repeat EGD since. Patient came in today for follow-up visit. Background  This is a very pleasant 82-year-old who came in today for the evaluation management of cirrhosis. Etiology of cirrhosis thought to be related to alcohol. Patient was diagnosed with cirrhosis on previous imaging almost 2 years ago she has been abstinent alcohol of over the last 2 months. Patient denies any history of hematemesis melena hematochezia no previous study of esophageal variceal bleed. Patient mentioned that she had previous EGD long time ago following the diagnosis of food impaction. Patient denies increased abdominal girth though she reports that she gained 10 pounds over the last few weeks. Denies any hospitalization related to confusion change in mental status/liver condition. Denies any jaundice.   Patient mentioned that she is about to have imaging of her liver MRI abdomen  Past Medical History:   Diagnosis Date    Allergic rhinitis     Allergies     Asthma     Closed dislocation of finger of left hand 5/4/2018    Dermoid tumor     Essential hypertension 5/15/2018    Hyperthyroidism     Thyroid disease      Past Surgical History:   Procedure Laterality Date    LASIK Bilateral 05/2021    MANDIBLE SURGERY       Social History     Socioeconomic History    Marital status:      Spouse name: Rekha Hutchins Number of children: 0    Years of education: 23    Highest education level: Professional school degree (e.g., MD, DDS, DVM, VIVIENNE)   Occupational History    Occupation: retired   Tobacco Use    Smoking status: Former Smoker     Packs/day: 0.25     Years: 5.00     Pack years: 1.25     Types: Cigarettes     Quit date: 1990     Years since quittin.1    Smokeless tobacco: Never Used   Vaping Use    Vaping Use: Never used   Substance and Sexual Activity    Alcohol use: Yes     Comment: occassionally    Drug use: Not Currently    Sexual activity: Yes     Partners: Male     Comment:  and monogamous   Other Topics Concern    Not on file   Social History Narrative    Not on file     Social Determinants of Health     Financial Resource Strain: Low Risk     Difficulty of Paying Living Expenses: Not very hard   Food Insecurity: No Food Insecurity    Worried About Running Out of Food in the Last Year: Never true    Ashvin of Food in the Last Year: Never true   Transportation Needs:     Lack of Transportation (Medical):      Lack of Transportation (Non-Medical):    Physical Activity:     Days of Exercise per Week:     Minutes of Exercise per Session:    Stress:     Feeling of Stress :    Social Connections:     Frequency of Communication with Friends and Family:     Frequency of Social Gatherings with Friends and Family:     Attends Faith Services:     Active Member of Clubs or Organizations:     Attends Club or Organization Meetings:     Marital Status:    Intimate Partner Violence:     Fear of Current or Ex-Partner:     Emotionally Abused:     Physically Abused:     Sexually Abused:      Family History   Problem Relation Age of Onset    Anemia Mother     Asthma Mother     Obesity Mother     Alcohol Abuse Neg Hx     Allergy (Severe) Neg Hx     Arthritis Neg Hx     Arrhythmia Neg Hx     Atrial Fibrillation Neg Hx     Birth Defects Neg Hx     Breast Cancer Neg Hx     Cancer Neg Hx     Coronary Art Dis Neg Hx     Colon Cancer Neg Hx     Depression Neg Hx     Diabetes Neg Hx     Early Death Neg Hx     Hearing Loss Neg Hx     Heart Attack Neg Hx     Heart Disease Neg Hx     High Blood Pressure Neg Hx     High Cholesterol Neg Hx     Kidney Disease Neg Hx     Learning Disabilities Neg Hx     Mental Illness Neg Hx     Mental Retardation Neg Hx     Miscarriages / Stillbirths Neg Hx     Osteoporosis Neg Hx     Prostate Cancer Neg Hx     Stroke Neg Hx     Substance Abuse Neg Hx     Vision Loss Neg Hx      No Known Allergies      Review of Systems   Constitutional: Negative for appetite change, chills, fatigue, fever and unexpected weight change. HENT: Negative for nosebleeds, tinnitus, trouble swallowing and voice change. Eyes: Negative for photophobia, pain and redness. Respiratory: Negative for chest tightness, shortness of breath and wheezing. Cardiovascular: Negative for chest pain, palpitations and leg swelling. Gastrointestinal: Negative for abdominal distention, abdominal pain, blood in stool, constipation, diarrhea, nausea, rectal pain and vomiting. Endocrine: Negative for polydipsia, polyphagia and polyuria. Genitourinary: Negative for difficulty urinating and hematuria. Skin: Negative for color change, pallor and rash. Neurological: Negative for dizziness, speech difficulty and headaches. Psychiatric/Behavioral: Negative for confusion and suicidal ideas. Objective:   /70 (Site: Right Upper Arm, Position: Sitting, Cuff Size: Large Adult)   Pulse 83   Wt 179 lb (81.2 kg)   LMP  (LMP Unknown)   SpO2 98%   BMI 27.22 kg/m²     Physical Exam  Constitutional:       General: She is not in acute distress. Appearance: She is well-developed. HENT:      Head: Normocephalic and atraumatic. Eyes:      Conjunctiva/sclera: Conjunctivae normal.      Pupils: Pupils are equal, round, and reactive to light.    Cardiovascular:      Rate and Rhythm: Normal rate and regular rhythm. Heart sounds: Normal heart sounds. Pulmonary:      Effort: Pulmonary effort is normal. No respiratory distress. Breath sounds: Normal breath sounds. No wheezing or rales. Abdominal:      General: Bowel sounds are normal. There is no distension. Palpations: Abdomen is soft. Abdomen is not rigid. There is no hepatomegaly, splenomegaly or mass. Tenderness: There is no abdominal tenderness. There is no guarding or rebound. Musculoskeletal:         General: No tenderness or deformity. Normal range of motion. Cervical back: Neck supple. Skin:     Coloration: Skin is not pale. Findings: No erythema or rash. Neurological:      Mental Status: She is alert and oriented to person, place, and time. Laboratory, Pathology, Radiology reviewed in detail with relevantimportant investigations summarized below:  Lab Results   Component Value Date    WBC 3.9 08/13/2021    WBC 4.7 12/10/2019    HGB 13.5 08/13/2021    HGB 14.1 12/10/2019    HCT 39.0 08/13/2021    HCT 41.8 12/10/2019    MCV 90.4 08/13/2021    MCV 94.6 12/10/2019     08/13/2021     12/10/2019    . Lab Results   Component Value Date    ALT 9 08/13/2021    ALT 12 07/29/2021    ALT 14 07/19/2021    AST 19 08/13/2021    AST 19 07/29/2021    AST 21 07/19/2021     07/19/2021    ALKPHOS 74 08/13/2021    ALKPHOS 74 07/29/2021    ALKPHOS 82 07/19/2021    BILITOT 1.2 08/13/2021    BILITOT 1.5 07/29/2021    BILITOT 2.3 07/19/2021       MRI PELVIS W WO CONTRAST    Result Date: 7/29/2021  EXAMINATION: MRI PELVIS W WO CONTRAST DATE AND TIME:7/29/2021 7:45 AM CLINICAL HISTORY: N94.89 Adnexal mass ICD10 COMPARISON: None TECHNIQUE:Multiplanar, multi-pulse sequence MRI of the pelvis. . 20 cc of MultiHance contrast administered intravenously. Uterus:       Position: Anteverted. No focal uterine mass. Size: 7 x 5.2 x 3.3 cm.        Endometrium: 3 mm       Mass: In the right adnexal area there is a complex mass measuring 9.3 x 4.6 x 5.1 cm. This contains a mixture of soft tissue density and fat consistent with a dermoid. Left ovary appears grossly normal. Other findings:  Urinary bladder is normal. There is no free fluid. Organs of the pelvis are otherwise unremarkable. 9.3 X 4.6 X 5.1 CM RIGHT ADNEXAL MASS CONSISTENT WITH AN OVARIAN DERMOID TUMOR. MRI ABDOMEN W WO CONTRAST    Result Date: 7/29/2021  EXAMINATION: MRI ABDOMEN W WO CONTRAST DATE AND TIME:7/29/2021 7:45 AM CLINICAL HISTORY: R79.89 LFT elevation ICD10 COMPARISON: May 30, 2019 TECHNIQUE:Multiplanar, multi-pulse sequence MRI of the abdomen without intravenous contrast). Pulse sequences included but not limited to: axial in- and out of phase, coronal 2-D FIESTA, coronal fat-saturation T2, axial T2 without and with fat-saturation, axial fat-suppressed T1. 20 cc of gadolinium contrast administered intravenously. FINDINGS:      Liver:  Right hepatic lobe less prominent than on the previous exam with mild lobulated hepatic contour now demonstrated. Left hepatic lobe atrophy. Findings compatible with cirrhosis. There is focal fatty sparing in the left hepatic lobe again noted. On axial delayed phase images there is delayed contrast washout compatible with confluent fibrosis. No focal enhancing lesions. There is a small recanalized paraumbilical vein. Findings suggest possible portal hypertension. Gallbladder: There are no filling defects in the gallbladder to suggest gallstones. There is no MR evidence of acute cholecystitis. There is no biliary ductal dilation. Pancreas: The pancreas has normal signal intensity with no mass or ductal dilatation. Spleen: Normal      Adrenals: Normal      Kidneys:  No focal renal mass. No hydronephrosis. Bowel: The included small bowel and colonic loops are all normal in caliber and wall thickness.   No evidence of small bowel obstruction. Other findings: Incidental umbilical fat hernia again noted. There is no abdominal adenopathy, including imaged retroperitoneum. No free fluid or fluid collection. Normal caliber aorta. FINDINGS CONSISTENT WITH CIRRHOSIS . FOCAL FATTY SPARING IN THE LEFT HEPATIC LOBE. Lab Results   Component Value Date    IRON 98 08/13/2021    TIBC 330 08/13/2021    FERRITIN 182.2 08/13/2021     Lab Results   Component Value Date    INR 1.0 08/13/2021     No components found for: ACUTEHEPATITISSCREEN  No components found for: CELIACPANEL  No components found for: STOOLCULTURE, C.DIFF, STOOLOVAPARASITE, STOOLLEUCOCYTE        Assessment:      1-  Cirrhosis secondary to ETOH  :  Patient is currently abstinent from alcohol since mid May 2021  2 -  Ascites:   Not  clinically significant  3- EGD for Variceal surveillance: We will proceed with upper endoscopy to assess for esophageal varices  4 - HCC screening:   MRI reviewed with no evidence of liver mass. Noted recanalization of the umbilical vein that may raise question regarding portal hypertension. Noted however normal spleen size and platelet count  5-  Overt Hepatic encephalopathy:   None at this time  6- Nutrition :   Continue multivitamin / Nutritional  support   7- Preventive measure:   -Hepatitis A immune. Will need hepatitis B vaccination. 8- Right adnexal mass  - Patient is scheduled or planned to have laparoscopic pelvic mass removal and possible right oophorectomy  - Noted Child-Wells / CP A convey low surgical risk. Normal platelet and coagulation profile  - [facility] risk Post-operative Mortality Risk in Patients with Cirrhosis:  0.5 % at 7 days and 2.2 %, 3.6% at 30 days and 90 days respectively  -Patient with portal hypertension on imaging with recanalization of umbilical vein that may convey higher surgical risk of decompensation postop  -Patient has been alcohol abstinent over the last 2 to 3-month.   Patient mentioned that she will discuss with her gynecology regarding timing of the procedure. Return in about 2 months (around 10/24/2021) for Post procedure results discussion, further management.       Thu Woods MD

## 2021-08-25 ENCOUNTER — PATIENT MESSAGE (OUTPATIENT)
Dept: GASTROENTEROLOGY | Age: 56
End: 2021-08-25

## 2021-08-25 NOTE — TELEPHONE ENCOUNTER
From: [employer] [facility]  To: Phillip Arcos MD  Sent: 8/25/2021 7:53 AM EDT  Subject: Non-Urgent Medical Question    Good morning! First, thank you for what we discussed yesterday. I just had a couple questions. What hepatitis vaccines should I get? A, B, C or is there a shot for all? Second, where and when should I get the vaccine? Finally, why is the esophageal check needed? I'll call to schedule that after I take care if the ovary issue. Thank you again and have a great day!

## 2021-08-26 DIAGNOSIS — E87.6 HYPOKALEMIA: ICD-10-CM

## 2021-08-26 RX ORDER — POTASSIUM CHLORIDE 20 MEQ/1
TABLET, EXTENDED RELEASE ORAL
Qty: 30 TABLET | Refills: 2 | Status: SHIPPED | OUTPATIENT
Start: 2021-08-26 | End: 2021-12-01 | Stop reason: SDUPTHER

## 2021-10-18 ENCOUNTER — HOSPITAL ENCOUNTER (OUTPATIENT)
Dept: PREADMISSION TESTING | Age: 56
Discharge: HOME | End: 2021-10-22
Payer: COMMERCIAL

## 2021-10-18 VITALS
HEART RATE: 91 BPM | HEIGHT: 68 IN | SYSTOLIC BLOOD PRESSURE: 115 MMHG | WEIGHT: 176.6 LBS | DIASTOLIC BLOOD PRESSURE: 75 MMHG | TEMPERATURE: 96.7 F | RESPIRATION RATE: 16 BRPM | BODY MASS INDEX: 26.76 KG/M2 | OXYGEN SATURATION: 98 %

## 2021-10-18 LAB
ABO/RH: NORMAL
ANION GAP SERPL CALCULATED.3IONS-SCNC: 11 MEQ/L (ref 9–15)
ANTIBODY SCREEN: NORMAL
BUN BLDV-MCNC: 12 MG/DL (ref 6–20)
CALCIUM SERPL-MCNC: 10.1 MG/DL (ref 8.5–9.9)
CHLORIDE BLD-SCNC: 86 MEQ/L (ref 95–107)
CO2: 28 MEQ/L (ref 20–31)
CREAT SERPL-MCNC: 1.03 MG/DL (ref 0.5–0.9)
EKG ATRIAL RATE: 81 BPM
EKG P AXIS: 58 DEGREES
EKG P-R INTERVAL: 200 MS
EKG Q-T INTERVAL: 408 MS
EKG QRS DURATION: 64 MS
EKG QTC CALCULATION (BAZETT): 473 MS
EKG R AXIS: 12 DEGREES
EKG T AXIS: 48 DEGREES
EKG VENTRICULAR RATE: 81 BPM
GFR AFRICAN AMERICAN: >60
GFR NON-AFRICAN AMERICAN: 55.7
GLUCOSE BLD-MCNC: 101 MG/DL (ref 70–99)
HCT VFR BLD CALC: 40.5 % (ref 37–47)
HEMOGLOBIN: 14 G/DL (ref 12–16)
MCH RBC QN AUTO: 29.3 PG (ref 27–31.3)
MCHC RBC AUTO-ENTMCNC: 34.5 % (ref 33–37)
MCV RBC AUTO: 85 FL (ref 82–100)
PDW BLD-RTO: 12.6 % (ref 11.5–14.5)
PLATELET # BLD: 288 K/UL (ref 130–400)
POTASSIUM SERPL-SCNC: 3.7 MEQ/L (ref 3.4–4.9)
RBC # BLD: 4.76 M/UL (ref 4.2–5.4)
SODIUM BLD-SCNC: 125 MEQ/L (ref 135–144)
WBC # BLD: 6.2 K/UL (ref 4.8–10.8)

## 2021-10-18 PROCEDURE — 86900 BLOOD TYPING SEROLOGIC ABO: CPT

## 2021-10-18 PROCEDURE — 86850 RBC ANTIBODY SCREEN: CPT

## 2021-10-18 PROCEDURE — 93005 ELECTROCARDIOGRAM TRACING: CPT | Performed by: OBSTETRICS & GYNECOLOGY

## 2021-10-18 PROCEDURE — 93010 ELECTROCARDIOGRAM REPORT: CPT | Performed by: INTERNAL MEDICINE

## 2021-10-18 PROCEDURE — 85027 COMPLETE CBC AUTOMATED: CPT

## 2021-10-18 PROCEDURE — 86901 BLOOD TYPING SEROLOGIC RH(D): CPT

## 2021-10-18 PROCEDURE — 80048 BASIC METABOLIC PNL TOTAL CA: CPT

## 2021-10-18 RX ORDER — SODIUM CHLORIDE 0.9 % (FLUSH) 0.9 %
10 SYRINGE (ML) INJECTION PRN
Status: CANCELLED | OUTPATIENT
Start: 2021-10-20

## 2021-10-18 RX ORDER — SODIUM CHLORIDE 0.9 % (FLUSH) 0.9 %
10 SYRINGE (ML) INJECTION EVERY 12 HOURS SCHEDULED
Status: CANCELLED | OUTPATIENT
Start: 2021-10-20

## 2021-10-18 RX ORDER — SODIUM CHLORIDE 9 MG/ML
25 INJECTION, SOLUTION INTRAVENOUS PRN
Status: CANCELLED | OUTPATIENT
Start: 2021-10-20

## 2021-10-18 RX ORDER — SODIUM CHLORIDE, SODIUM LACTATE, POTASSIUM CHLORIDE, CALCIUM CHLORIDE 600; 310; 30; 20 MG/100ML; MG/100ML; MG/100ML; MG/100ML
INJECTION, SOLUTION INTRAVENOUS CONTINUOUS
Status: CANCELLED | OUTPATIENT
Start: 2021-10-20

## 2021-10-18 RX ORDER — LIDOCAINE HYDROCHLORIDE 10 MG/ML
1 INJECTION, SOLUTION EPIDURAL; INFILTRATION; INTRACAUDAL; PERINEURAL
Status: CANCELLED | OUTPATIENT
Start: 2021-10-20 | End: 2021-10-20

## 2021-10-20 ENCOUNTER — ANESTHESIA EVENT (OUTPATIENT)
Dept: OPERATING ROOM | Age: 56
End: 2021-10-20
Payer: COMMERCIAL

## 2021-10-20 ENCOUNTER — HOSPITAL ENCOUNTER (OUTPATIENT)
Age: 56
Setting detail: OUTPATIENT SURGERY
Discharge: HOME | End: 2021-10-20
Attending: OBSTETRICS & GYNECOLOGY | Admitting: OBSTETRICS & GYNECOLOGY
Payer: COMMERCIAL

## 2021-10-20 ENCOUNTER — ANESTHESIA (OUTPATIENT)
Dept: OPERATING ROOM | Age: 56
End: 2021-10-20
Payer: COMMERCIAL

## 2021-10-20 VITALS
DIASTOLIC BLOOD PRESSURE: 77 MMHG | HEIGHT: 68 IN | SYSTOLIC BLOOD PRESSURE: 122 MMHG | BODY MASS INDEX: 26.67 KG/M2 | HEART RATE: 70 BPM | OXYGEN SATURATION: 100 % | TEMPERATURE: 96.1 F | WEIGHT: 176 LBS | RESPIRATION RATE: 19 BRPM

## 2021-10-20 VITALS — TEMPERATURE: 63.5 F | DIASTOLIC BLOOD PRESSURE: 89 MMHG | SYSTOLIC BLOOD PRESSURE: 152 MMHG | OXYGEN SATURATION: 90 %

## 2021-10-20 DIAGNOSIS — G89.18 POSTOPERATIVE PAIN: Primary | ICD-10-CM

## 2021-10-20 PROCEDURE — 3700000000 HC ANESTHESIA ATTENDED CARE: Performed by: OBSTETRICS & GYNECOLOGY

## 2021-10-20 PROCEDURE — 6360000002 HC RX W HCPCS: Performed by: ANESTHESIOLOGY

## 2021-10-20 PROCEDURE — 7100000010 HC PHASE II RECOVERY - FIRST 15 MIN: Performed by: OBSTETRICS & GYNECOLOGY

## 2021-10-20 PROCEDURE — 2720000010 HC SURG SUPPLY STERILE: Performed by: OBSTETRICS & GYNECOLOGY

## 2021-10-20 PROCEDURE — 2580000003 HC RX 258: Performed by: OBSTETRICS & GYNECOLOGY

## 2021-10-20 PROCEDURE — 3600000014 HC SURGERY LEVEL 4 ADDTL 15MIN: Performed by: OBSTETRICS & GYNECOLOGY

## 2021-10-20 PROCEDURE — 6370000000 HC RX 637 (ALT 250 FOR IP): Performed by: ANESTHESIOLOGY

## 2021-10-20 PROCEDURE — 88305 TISSUE EXAM BY PATHOLOGIST: CPT

## 2021-10-20 PROCEDURE — 7100000011 HC PHASE II RECOVERY - ADDTL 15 MIN: Performed by: OBSTETRICS & GYNECOLOGY

## 2021-10-20 PROCEDURE — 2709999900 HC NON-CHARGEABLE SUPPLY: Performed by: OBSTETRICS & GYNECOLOGY

## 2021-10-20 PROCEDURE — 6360000002 HC RX W HCPCS: Performed by: NURSE ANESTHETIST, CERTIFIED REGISTERED

## 2021-10-20 PROCEDURE — C1760 CLOSURE DEV, VASC: HCPCS | Performed by: OBSTETRICS & GYNECOLOGY

## 2021-10-20 PROCEDURE — 2580000003 HC RX 258: Performed by: NURSE PRACTITIONER

## 2021-10-20 PROCEDURE — 76942 ECHO GUIDE FOR BIOPSY: CPT | Performed by: ANESTHESIOLOGY

## 2021-10-20 PROCEDURE — 2500000003 HC RX 250 WO HCPCS: Performed by: NURSE ANESTHETIST, CERTIFIED REGISTERED

## 2021-10-20 PROCEDURE — 3700000001 HC ADD 15 MINUTES (ANESTHESIA): Performed by: OBSTETRICS & GYNECOLOGY

## 2021-10-20 PROCEDURE — 58661 LAPAROSCOPY REMOVE ADNEXA: CPT | Performed by: OBSTETRICS & GYNECOLOGY

## 2021-10-20 PROCEDURE — 6360000002 HC RX W HCPCS: Performed by: OBSTETRICS & GYNECOLOGY

## 2021-10-20 PROCEDURE — 3600000004 HC SURGERY LEVEL 4 BASE: Performed by: OBSTETRICS & GYNECOLOGY

## 2021-10-20 PROCEDURE — 2580000003 HC RX 258: Performed by: NURSE ANESTHETIST, CERTIFIED REGISTERED

## 2021-10-20 PROCEDURE — 7100000000 HC PACU RECOVERY - FIRST 15 MIN: Performed by: OBSTETRICS & GYNECOLOGY

## 2021-10-20 PROCEDURE — 7100000001 HC PACU RECOVERY - ADDTL 15 MIN: Performed by: OBSTETRICS & GYNECOLOGY

## 2021-10-20 RX ORDER — ROCURONIUM BROMIDE 10 MG/ML
INJECTION, SOLUTION INTRAVENOUS PRN
Status: DISCONTINUED | OUTPATIENT
Start: 2021-10-20 | End: 2021-10-20 | Stop reason: SDUPTHER

## 2021-10-20 RX ORDER — HYDROCODONE BITARTRATE AND ACETAMINOPHEN 5; 325 MG/1; MG/1
2 TABLET ORAL PRN
Status: COMPLETED | OUTPATIENT
Start: 2021-10-20 | End: 2021-10-20

## 2021-10-20 RX ORDER — OXYCODONE HYDROCHLORIDE AND ACETAMINOPHEN 5; 325 MG/1; MG/1
2 TABLET ORAL EVERY 4 HOURS PRN
Status: DISCONTINUED | OUTPATIENT
Start: 2021-10-20 | End: 2021-10-20 | Stop reason: HOSPADM

## 2021-10-20 RX ORDER — ROPIVACAINE HYDROCHLORIDE 5 MG/ML
INJECTION, SOLUTION EPIDURAL; INFILTRATION; PERINEURAL PRN
Status: DISCONTINUED | OUTPATIENT
Start: 2021-10-20 | End: 2021-10-20 | Stop reason: SDUPTHER

## 2021-10-20 RX ORDER — ONDANSETRON 2 MG/ML
4 INJECTION INTRAMUSCULAR; INTRAVENOUS EVERY 6 HOURS PRN
Status: DISCONTINUED | OUTPATIENT
Start: 2021-10-20 | End: 2021-10-20 | Stop reason: HOSPADM

## 2021-10-20 RX ORDER — DOCUSATE SODIUM 100 MG/1
100 CAPSULE, LIQUID FILLED ORAL 2 TIMES DAILY PRN
Qty: 60 CAPSULE | Refills: 2 | Status: SHIPPED | OUTPATIENT
Start: 2021-10-20 | End: 2021-11-16 | Stop reason: ALTCHOICE

## 2021-10-20 RX ORDER — SIMETHICONE 80 MG
80 TABLET,CHEWABLE ORAL 4 TIMES DAILY PRN
Qty: 180 TABLET | Refills: 1 | Status: SHIPPED | OUTPATIENT
Start: 2021-10-20 | End: 2021-11-16 | Stop reason: ALTCHOICE

## 2021-10-20 RX ORDER — SODIUM CHLORIDE 0.9 % (FLUSH) 0.9 %
5-40 SYRINGE (ML) INJECTION PRN
Status: DISCONTINUED | OUTPATIENT
Start: 2021-10-20 | End: 2021-10-20 | Stop reason: HOSPADM

## 2021-10-20 RX ORDER — MEPERIDINE HYDROCHLORIDE 25 MG/ML
12.5 INJECTION INTRAMUSCULAR; INTRAVENOUS; SUBCUTANEOUS EVERY 5 MIN PRN
Status: DISCONTINUED | OUTPATIENT
Start: 2021-10-20 | End: 2021-10-20 | Stop reason: HOSPADM

## 2021-10-20 RX ORDER — METOCLOPRAMIDE HYDROCHLORIDE 5 MG/ML
10 INJECTION INTRAMUSCULAR; INTRAVENOUS
Status: DISCONTINUED | OUTPATIENT
Start: 2021-10-20 | End: 2021-10-20 | Stop reason: HOSPADM

## 2021-10-20 RX ORDER — SODIUM CHLORIDE 0.9 % (FLUSH) 0.9 %
5-40 SYRINGE (ML) INJECTION EVERY 12 HOURS SCHEDULED
Status: DISCONTINUED | OUTPATIENT
Start: 2021-10-20 | End: 2021-10-20 | Stop reason: HOSPADM

## 2021-10-20 RX ORDER — LIDOCAINE HYDROCHLORIDE 20 MG/ML
INJECTION, SOLUTION EPIDURAL; INFILTRATION; INTRACAUDAL; PERINEURAL PRN
Status: DISCONTINUED | OUTPATIENT
Start: 2021-10-20 | End: 2021-10-20 | Stop reason: SDUPTHER

## 2021-10-20 RX ORDER — DEXAMETHASONE SODIUM PHOSPHATE 4 MG/ML
INJECTION, SOLUTION INTRA-ARTICULAR; INTRALESIONAL; INTRAMUSCULAR; INTRAVENOUS; SOFT TISSUE PRN
Status: DISCONTINUED | OUTPATIENT
Start: 2021-10-20 | End: 2021-10-20 | Stop reason: SDUPTHER

## 2021-10-20 RX ORDER — HYDROCODONE BITARTRATE AND ACETAMINOPHEN 5; 325 MG/1; MG/1
1 TABLET ORAL PRN
Status: COMPLETED | OUTPATIENT
Start: 2021-10-20 | End: 2021-10-20

## 2021-10-20 RX ORDER — MIDAZOLAM HYDROCHLORIDE 1 MG/ML
INJECTION INTRAMUSCULAR; INTRAVENOUS PRN
Status: DISCONTINUED | OUTPATIENT
Start: 2021-10-20 | End: 2021-10-20 | Stop reason: SDUPTHER

## 2021-10-20 RX ORDER — OXYCODONE HYDROCHLORIDE AND ACETAMINOPHEN 5; 325 MG/1; MG/1
2 TABLET ORAL NIGHTLY PRN
Qty: 14 TABLET | Refills: 0 | Status: SHIPPED | OUTPATIENT
Start: 2021-10-20 | End: 2021-11-03

## 2021-10-20 RX ORDER — DIPHENHYDRAMINE HYDROCHLORIDE 50 MG/ML
12.5 INJECTION INTRAMUSCULAR; INTRAVENOUS
Status: DISCONTINUED | OUTPATIENT
Start: 2021-10-20 | End: 2021-10-20 | Stop reason: HOSPADM

## 2021-10-20 RX ORDER — SODIUM CHLORIDE, SODIUM LACTATE, POTASSIUM CHLORIDE, CALCIUM CHLORIDE 600; 310; 30; 20 MG/100ML; MG/100ML; MG/100ML; MG/100ML
INJECTION, SOLUTION INTRAVENOUS CONTINUOUS
Status: DISCONTINUED | OUTPATIENT
Start: 2021-10-20 | End: 2021-10-20 | Stop reason: HOSPADM

## 2021-10-20 RX ORDER — MAGNESIUM HYDROXIDE 1200 MG/15ML
LIQUID ORAL CONTINUOUS PRN
Status: COMPLETED | OUTPATIENT
Start: 2021-10-20 | End: 2021-10-20

## 2021-10-20 RX ORDER — SODIUM CHLORIDE 9 MG/ML
25 INJECTION, SOLUTION INTRAVENOUS PRN
Status: DISCONTINUED | OUTPATIENT
Start: 2021-10-20 | End: 2021-10-20 | Stop reason: HOSPADM

## 2021-10-20 RX ORDER — ACETAMINOPHEN 500 MG
1000 TABLET ORAL EVERY 6 HOURS PRN
Qty: 60 TABLET | Refills: 1 | Status: SHIPPED | OUTPATIENT
Start: 2021-10-20 | End: 2021-11-16 | Stop reason: ALTCHOICE

## 2021-10-20 RX ORDER — ONDANSETRON 2 MG/ML
4 INJECTION INTRAMUSCULAR; INTRAVENOUS
Status: DISCONTINUED | OUTPATIENT
Start: 2021-10-20 | End: 2021-10-20 | Stop reason: HOSPADM

## 2021-10-20 RX ORDER — PROPOFOL 10 MG/ML
INJECTION, EMULSION INTRAVENOUS PRN
Status: DISCONTINUED | OUTPATIENT
Start: 2021-10-20 | End: 2021-10-20 | Stop reason: SDUPTHER

## 2021-10-20 RX ORDER — POLYETHYLENE GLYCOL 3350 17 G/17G
17 POWDER, FOR SOLUTION ORAL 2 TIMES DAILY PRN
Qty: 1020 G | Refills: 1 | Status: SHIPPED | OUTPATIENT
Start: 2021-10-20 | End: 2021-11-16 | Stop reason: ALTCHOICE

## 2021-10-20 RX ORDER — SODIUM CHLORIDE 0.9 % (FLUSH) 0.9 %
10 SYRINGE (ML) INJECTION PRN
Status: DISCONTINUED | OUTPATIENT
Start: 2021-10-20 | End: 2021-10-20 | Stop reason: HOSPADM

## 2021-10-20 RX ORDER — ONDANSETRON 4 MG/1
4 TABLET, ORALLY DISINTEGRATING ORAL EVERY 8 HOURS PRN
Status: DISCONTINUED | OUTPATIENT
Start: 2021-10-20 | End: 2021-10-20 | Stop reason: HOSPADM

## 2021-10-20 RX ORDER — ACETAMINOPHEN 325 MG/1
650 TABLET ORAL EVERY 4 HOURS PRN
Status: DISCONTINUED | OUTPATIENT
Start: 2021-10-20 | End: 2021-10-20 | Stop reason: HOSPADM

## 2021-10-20 RX ORDER — SODIUM CHLORIDE 0.9 % (FLUSH) 0.9 %
10 SYRINGE (ML) INJECTION EVERY 12 HOURS SCHEDULED
Status: DISCONTINUED | OUTPATIENT
Start: 2021-10-20 | End: 2021-10-20 | Stop reason: HOSPADM

## 2021-10-20 RX ORDER — ONDANSETRON 4 MG/1
4 TABLET, FILM COATED ORAL EVERY 6 HOURS PRN
Qty: 30 TABLET | Refills: 1 | Status: SHIPPED | OUTPATIENT
Start: 2021-10-20 | End: 2021-11-16 | Stop reason: ALTCHOICE

## 2021-10-20 RX ORDER — KETOROLAC TROMETHAMINE 30 MG/ML
30 INJECTION, SOLUTION INTRAMUSCULAR; INTRAVENOUS EVERY 6 HOURS
Status: DISCONTINUED | OUTPATIENT
Start: 2021-10-20 | End: 2021-10-20 | Stop reason: HOSPADM

## 2021-10-20 RX ORDER — FENTANYL CITRATE 50 UG/ML
25 INJECTION, SOLUTION INTRAMUSCULAR; INTRAVENOUS EVERY 10 MIN PRN
Status: DISCONTINUED | OUTPATIENT
Start: 2021-10-20 | End: 2021-10-20 | Stop reason: HOSPADM

## 2021-10-20 RX ORDER — SODIUM CHLORIDE, SODIUM LACTATE, POTASSIUM CHLORIDE, CALCIUM CHLORIDE 600; 310; 30; 20 MG/100ML; MG/100ML; MG/100ML; MG/100ML
INJECTION, SOLUTION INTRAVENOUS CONTINUOUS PRN
Status: DISCONTINUED | OUTPATIENT
Start: 2021-10-20 | End: 2021-10-20 | Stop reason: SDUPTHER

## 2021-10-20 RX ORDER — LIDOCAINE HYDROCHLORIDE 10 MG/ML
1 INJECTION, SOLUTION EPIDURAL; INFILTRATION; INTRACAUDAL; PERINEURAL
Status: DISCONTINUED | OUTPATIENT
Start: 2021-10-20 | End: 2021-10-20 | Stop reason: HOSPADM

## 2021-10-20 RX ORDER — ONDANSETRON 2 MG/ML
INJECTION INTRAMUSCULAR; INTRAVENOUS PRN
Status: DISCONTINUED | OUTPATIENT
Start: 2021-10-20 | End: 2021-10-20 | Stop reason: SDUPTHER

## 2021-10-20 RX ORDER — IBUPROFEN 800 MG/1
800 TABLET ORAL EVERY 6 HOURS PRN
Qty: 60 TABLET | Refills: 1 | Status: SHIPPED | OUTPATIENT
Start: 2021-10-20 | End: 2021-11-16 | Stop reason: ALTCHOICE

## 2021-10-20 RX ADMIN — HYDROCODONE BITARTRATE AND ACETAMINOPHEN 2 TABLET: 5; 325 TABLET ORAL at 14:55

## 2021-10-20 RX ADMIN — SODIUM CHLORIDE, POTASSIUM CHLORIDE, SODIUM LACTATE AND CALCIUM CHLORIDE: 600; 310; 30; 20 INJECTION, SOLUTION INTRAVENOUS at 11:31

## 2021-10-20 RX ADMIN — LIDOCAINE HYDROCHLORIDE 50 MG: 20 INJECTION, SOLUTION EPIDURAL; INFILTRATION; INTRACAUDAL; PERINEURAL at 11:35

## 2021-10-20 RX ADMIN — PROPOFOL 200 MG: 10 INJECTION, EMULSION INTRAVENOUS at 11:35

## 2021-10-20 RX ADMIN — FENTANYL CITRATE 25 MCG: 50 INJECTION INTRAMUSCULAR; INTRAVENOUS at 13:29

## 2021-10-20 RX ADMIN — ROPIVACAINE HYDROCHLORIDE 30 ML: 5 INJECTION, SOLUTION EPIDURAL; INFILTRATION; PERINEURAL at 10:42

## 2021-10-20 RX ADMIN — FENTANYL CITRATE 25 MCG: 50 INJECTION INTRAMUSCULAR; INTRAVENOUS at 13:42

## 2021-10-20 RX ADMIN — CEFOXITIN SODIUM 2000 MG: 2 POWDER, FOR SOLUTION INTRAVENOUS at 11:37

## 2021-10-20 RX ADMIN — MIDAZOLAM HYDROCHLORIDE 2 MG: 2 INJECTION, SOLUTION INTRAMUSCULAR; INTRAVENOUS at 10:36

## 2021-10-20 RX ADMIN — KETOROLAC TROMETHAMINE 30 MG: 30 INJECTION, SOLUTION INTRAMUSCULAR at 14:20

## 2021-10-20 RX ADMIN — SUGAMMADEX 200 MG: 100 INJECTION, SOLUTION INTRAVENOUS at 12:40

## 2021-10-20 RX ADMIN — DEXAMETHASONE SODIUM PHOSPHATE 4 MG: 4 INJECTION, SOLUTION INTRAMUSCULAR; INTRAVENOUS at 11:45

## 2021-10-20 RX ADMIN — SODIUM CHLORIDE, POTASSIUM CHLORIDE, SODIUM LACTATE AND CALCIUM CHLORIDE: 600; 310; 30; 20 INJECTION, SOLUTION INTRAVENOUS at 10:23

## 2021-10-20 RX ADMIN — ONDANSETRON 4 MG: 2 INJECTION INTRAMUSCULAR; INTRAVENOUS at 11:45

## 2021-10-20 RX ADMIN — ROCURONIUM BROMIDE 50 MG: 10 INJECTION INTRAVENOUS at 11:35

## 2021-10-20 RX ADMIN — FENTANYL CITRATE 25 MCG: 50 INJECTION INTRAMUSCULAR; INTRAVENOUS at 13:04

## 2021-10-20 ASSESSMENT — PULMONARY FUNCTION TESTS
PIF_VALUE: 13
PIF_VALUE: 24
PIF_VALUE: 15
PIF_VALUE: 0
PIF_VALUE: 0
PIF_VALUE: 19
PIF_VALUE: 15
PIF_VALUE: 19
PIF_VALUE: 23
PIF_VALUE: 22
PIF_VALUE: 23
PIF_VALUE: 13
PIF_VALUE: 10
PIF_VALUE: 20
PIF_VALUE: 18
PIF_VALUE: 1
PIF_VALUE: 13
PIF_VALUE: 23
PIF_VALUE: 23
PIF_VALUE: 15
PIF_VALUE: 15
PIF_VALUE: 16
PIF_VALUE: 0
PIF_VALUE: 0
PIF_VALUE: 23
PIF_VALUE: 2
PIF_VALUE: 19
PIF_VALUE: 15
PIF_VALUE: 16
PIF_VALUE: 15
PIF_VALUE: 24
PIF_VALUE: 15
PIF_VALUE: 14
PIF_VALUE: 15
PIF_VALUE: 13
PIF_VALUE: 22
PIF_VALUE: 16
PIF_VALUE: 18
PIF_VALUE: 17
PIF_VALUE: 23
PIF_VALUE: 19
PIF_VALUE: 15
PIF_VALUE: 2
PIF_VALUE: 24
PIF_VALUE: 15
PIF_VALUE: 13
PIF_VALUE: 21
PIF_VALUE: 15
PIF_VALUE: 23
PIF_VALUE: 15
PIF_VALUE: 16
PIF_VALUE: 16
PIF_VALUE: 18
PIF_VALUE: 13
PIF_VALUE: 16
PIF_VALUE: 16
PIF_VALUE: 17
PIF_VALUE: 16
PIF_VALUE: 15
PIF_VALUE: 6
PIF_VALUE: 23
PIF_VALUE: 15
PIF_VALUE: 16
PIF_VALUE: 1
PIF_VALUE: 17
PIF_VALUE: 24
PIF_VALUE: 15
PIF_VALUE: 13
PIF_VALUE: 16
PIF_VALUE: 16
PIF_VALUE: 23
PIF_VALUE: 0
PIF_VALUE: 21
PIF_VALUE: 17
PIF_VALUE: 23
PIF_VALUE: 24
PIF_VALUE: 20
PIF_VALUE: 13

## 2021-10-20 ASSESSMENT — PAIN SCALES - GENERAL
PAINLEVEL_OUTOF10: 3
PAINLEVEL_OUTOF10: 3
PAINLEVEL_OUTOF10: 0
PAINLEVEL_OUTOF10: 1
PAINLEVEL_OUTOF10: 1
PAINLEVEL_OUTOF10: 2
PAINLEVEL_OUTOF10: 4
PAINLEVEL_OUTOF10: 1
PAINLEVEL_OUTOF10: 2

## 2021-10-20 ASSESSMENT — PAIN DESCRIPTION - LOCATION: LOCATION: ABDOMEN

## 2021-10-20 ASSESSMENT — PAIN DESCRIPTION - DESCRIPTORS: DESCRIPTORS: BURNING

## 2021-10-20 ASSESSMENT — PAIN DESCRIPTION - PAIN TYPE: TYPE: SURGICAL PAIN

## 2021-10-20 NOTE — PROGRESS NOTES
Received from or into pacu on acart accompanied in by Guided Interventions. Crna. O2 on at 8L mask, breath sounds clear to antrior auscultation. MP RSR. Abdomen soft, incisions tiffmes three to abdomen clean dry and intact with surgical glue, peripad noted with scant amount light red drainage noted. Pt arousable and responsive, denies complaint at this time.

## 2021-10-20 NOTE — ANESTHESIA PROCEDURE NOTES
Peripheral Block    Patient location during procedure: pre-op  Start time: 10/20/2021 10:38 AM  End time: 10/20/2021 10:45 AM  Staffing  Performed: anesthesiologist   Anesthesiologist: Neymar Phipps MD  Preanesthetic Checklist  Completed: patient identified, IV checked, site marked, risks and benefits discussed, surgical consent, monitors and equipment checked, pre-op evaluation, timeout performed, anesthesia consent given, oxygen available and patient being monitored  Peripheral Block  Patient position: sitting  Prep: ChloraPrep  Patient monitoring: cardiac monitor, continuous pulse ox, frequent blood pressure checks and IV access  Block type: Erector spinae (at T10 vertebra level)  Laterality: bilateral  Injection technique: single-shot  Guidance: ultrasound guided  Local infiltration: ropivacaine (20 ml of NS added)  Infiltration strength: 0.5 %  Dose: 30 mL  Provider prep: mask and sterile gloves (Sterile probe cover)  Local infiltration: ropivacaine (20 ml of NS added)  Needle  Needle type: combined needle/nerve stimulator   Needle gauge: 21 G  Needle length: 10 cm  Needle localization: anatomical landmarks and ultrasound guidance  Assessment  Injection assessment: negative aspiration for heme, no paresthesia on injection and local visualized surrounding nerve on ultrasound  Paresthesia pain: immediately resolved  Slow fractionated injection: yes  Hemodynamics: stable  Additional Notes  Ultrasound image printed and saved in patient chart.     Sterile probe cover used    Reason for block: post-op pain management and at surgeon's request Plan: Patient will call if it becomes irritated Detail Level: Detailed

## 2021-10-20 NOTE — OP NOTE
Patient: Apurva English. [address] [facility]  YOB: 1965  MRN: [id number]    Date of Procedure: 10/20/2021    Pre-Op Diagnosis: RIGHT ADNEXAL MASS 9 cm dermoid cyst     Post-Op Diagnosis: Same     IUD removal     Procedure(s):  LAPARSCOPIC ADNEXAL MASS REMOVAL WITH  RIGHT SALPINGO OOPHORECTOMY; INTRAUTERINE DEVICE REMOVAL    Surgeon(s):  Celestine Menendez MD    Assistant:  First Assistant: [address]    Anesthesia: General    Estimated Blood Loss (mL): Minimal    Complications: None    Specimens:   ID Type Source Tests Collected by Time Destination   A : right adnexal mass with right ovary and right fallopian tube Tissue Ovary SURGICAL PATHOLOGY Celestine Menendez MD 10/20/2021 1216        Implants:  * No implants in log *      Drains: * No LDAs found *    Findings: consistent with US . A minilap 4-5 cm suprapubic incision was needed to extract mass. Procedure Details      The patient was seen in the Holding Room. The risks, benefits, complications, treatment options, and expected outcomes were discussed with the patient. The patient concurred with the proposed plan, giving informed consent. The patient was taken to Operating Room,identified as name,  the procedure verified right adnexal mass removal possible right salpingo-oophorectomy. A Time Out was held and the above information confirmed. After induction of anesthesia, the patient was draped and prepped in the usual sterile manner. A Lama catheter was inserted into her bladder. Two retractors were placed into the vagina. A single tooth tenaculum was used to grasp the anterior lip of the cervix. IUD was noted to be in place, IV string was grasped using a ring forceps and removed to be able to place the [address] uterine manipulator which is essential to manipulate the uterus anticipating to be able to go around the mass if needed.      5 mm incision was then made in the supraumbilical area through which a Veress needle was inserted, abdomen was inflated to 15 mmHg of CO2 gas after which a 5 mm trocar with the scope was inserted under direct visualization, patient was then placed in steep Trendelenburg position and the pelvis was examined, the right adnexal mass was noted lodged in the cul-de-sac.  2 more incisions were made slightly below the level of the umbilicus on the left side a 5 mm incision was made 15 cm lateral to the midline through which a 5 mm trocar was inserted. Then a 12 mm incision was made 2 cm above the symphysis pubis through which a 12 mm trocar was inserted. I used the harmonic Ace device with the bipolar cautery to cauterize the right infundibulopelvic ligament which was then transected using the harmonic Ace device and through the mesosalpinx and the proximal end of the tube completely freeing the mass including the ovary and the tube. None hemostatic areas were controlled using the bipolar cautery. An Endobag was introduced through the 12 mm trocar in which the mass was placed. It was impossible to remove the mass through the 12 mm incision and it being a dermoid decision to drain the mass was not favorable due to the possible peritonitis that could be produced from such size of the mass amount of sebaceous and other material that would be expelled into the abdomen. As such the 12 mm incision was extended to approximately 4 5 mini laparotomy using the 11 blade which extended the fascia subcutaneous layer and the skin after which the mass was removed in the Endobag successfully. Fascia was then closed using 0 Vicryl in interrupted fashion hemostasis was noted to be intact skin was closed using 2-0 Vicryl in an interrupted fashion at the subcutaneous layer after which the skin was approximated using 3-0 Monocryl in subcuticular fashion and Dermabond was applied to the incision. A second look into the abdomen was carried out and irrigation was carried out hemostasis was noted to be intact at the right adnexa.   At that point the procedure was complete the laparoscopic skin incision was closed in the same manner using the 3-0 Monocryl and Dermabond. Gas was completely emptied attention was then towards the vagina and the ZUMI manipulator was removed successfully. All  the instruments were removed from the abdomen. The counts were correct . The skin incisions were closed with a subcuticular fashion. She was sent to the recovery room in good condition. Instrument, sponge, and needle counts were correct prior to  closure and at the conclusion of the case. Allison Gee M.D., F.A.C.O. G

## 2021-10-20 NOTE — ANESTHESIA POSTPROCEDURE EVALUATION
Department of Anesthesiology  Postprocedure Note    Patient: David Quevedo  MRN: [id number]  YOB: 1965  Date of evaluation: 10/20/2021  Time:  12:54 PM     Procedure Summary     Date: 10/20/21 Room / Location: [address]    Anesthesia Start: 8774 Anesthesia Stop: 1743    Procedure: LAPARSCOPIC ADNEXAL MASS REMOVAL WITH  RIGHT SALPINGO OOPHORECTOMY; INTRAUTERINE DEVICE REMOVAL (Right ) Diagnosis: (RIGHT ADNEXAL MASS)    Surgeons: Nga Sethi MD Responsible Provider: Andra Mejias MD    Anesthesia Type: general, regional ASA Status: 2          Anesthesia Type: general, regional    Kim Phase I:      Kim Phase II:      Last vitals: Reviewed and per EMR flowsheets.        Anesthesia Post Evaluation    Patient location during evaluation: PACU  Patient participation: complete - patient participated  Level of consciousness: awake  Pain score: 0  Airway patency: patent  Nausea & Vomiting: no nausea and no vomiting  Complications: no  Cardiovascular status: blood pressure returned to baseline and hemodynamically stable  Respiratory status: acceptable and face mask  Hydration status: stable

## 2021-10-20 NOTE — PROGRESS NOTES
Pt given discharge instructions at this time.  Scripts were sent to pharmacy and pt   picking up script  At [facility]

## 2021-10-20 NOTE — H&P
Mimi Poole is a 49 y.o. 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 .        Vitals:  /72 (Site: Left Upper Arm, Position: Sitting, Cuff Size: Medium Adult)   Pulse 72   Ht 5' 8\" (1.727 m)   Wt 179 lb (81.2 kg)   LMP  (LMP Unknown)   BMI 27.22 kg/m²   Allergies:  Patient has no known allergies.   Past Medical History        Past Medical History:   Diagnosis Date    Allergic rhinitis      Allergies      Asthma      Closed dislocation of finger of left hand 2018    Dermoid tumor      Essential hypertension 5/15/2018    Hyperthyroidism      Thyroid disease           Past Surgical History         Past Surgical History:   Procedure Laterality Date    LASIK Bilateral 2021    MANDIBLE SURGERY                      OB History         0    Para   0    Term   0       0    AB   0    Living   0        SAB   0    TAB   0    Ectopic   0    Molar   0    Multiple   0    Live Births   0                Family History         Family History   Problem Relation Age of Onset    Anemia Mother      Asthma Mother      Obesity Mother      Alcohol Abuse Neg Hx      Allergy (Severe) Neg Hx      Arthritis Neg Hx      Arrhythmia Neg Hx      Atrial Fibrillation Neg Hx      Birth Defects Neg Hx      Breast Cancer Neg Hx      Cancer Neg Hx      Coronary Art Dis Neg Hx      Colon Cancer Neg Hx      Depression Neg Hx      Diabetes Neg Hx      Early Death Neg Hx      Hearing Loss Neg Hx      Heart Attack Neg Hx      Heart Disease Neg Hx      High Blood Pressure Neg Hx      High Cholesterol Neg Hx      Kidney Disease Neg Hx      Learning Disabilities Neg Hx      Mental Illness Neg Hx      Mental Retardation Neg Hx      Miscarriages / Stillbirths Neg Hx      Osteoporosis Neg Hx      Prostate Cancer Neg Hx      Stroke Neg Hx      Substance Abuse Neg Hx      Vision Loss Neg Hx   appropriate.     Review of Systems  As per chief complaint   All other systems reviewed and are negative.     Physical Exam:  Vitals:  /72 (Site: Left Upper Arm, Position: Sitting, Cuff Size: Medium Adult)   Pulse 72   Ht 5' 8\" (1.727 m)   Wt 179 lb (81.2 kg)   LMP  (LMP Unknown)   BMI 27.22 kg/m²   Lungs: CTAB   Heart : Regular S1/S2, no M/R/G  Abdomen: Soft , NT, ND , + BS   Pelvic exam : deferred     Assessment:        Diagnosis Orders   1. Pelvic mass            Plan:      For laparoscopic pelvic mass removal and possible right oophorectomy.      Counseling: The patient was counseled on all options both medical and surgical, conservative as well as definitive. She has elected to proceed with the procedure as stated above.     The patient was counseled on the procedure. Risks and complications were reviewed in detail. The patients orders, labs, consents have been completed. The history and physical as well as all supporting surgical documentation will be forwarded to the pre-operative holding area.     The patient is aware that this procedure may not alleviate her symptoms. That there may be a necessity for a second surgery and that there may be an incomplete removal of abnormal tissue.     Discussed all risks and benefits of procedure in detail including risks of anesthesia, blood loss, need for transfusion, infection;  also potential for complication, injury, need for repair and/or removal of uterus, tubes, ovaries, bowel, bladder, ureters, blood vessels and nerves. Also discussed pre-operative and post-operative expectations.   Patient verbalizes understanding.              Urmila Santana MD

## 2021-10-20 NOTE — PROGRESS NOTES
Medicated with Fentanyl for complaint of \"lower belly\" pain, rates \"4\" on pain scale. Lama catheter noted intact, draining clear yellow urine.

## 2021-10-20 NOTE — ANESTHESIA PRE PROCEDURE
Department of Anesthesiology  Preprocedure Note       Name:  Elsa Duarte   Age:  49 y.o.  :  1965                                          MRN:  [id number]         Date:  10/20/2021      Surgeon: Jesus Alberto Leal):  Anselmo aMncilla MD    Procedure: Procedure(s):  LAPARSCOPIC ADNEXAL MASS REMOVAL WITH POSSIBLE RIGHT OOPHORECTOMY    Medications prior to admission:   Prior to Admission medications    Medication Sig Start Date End Date Taking? Authorizing Provider   polyethylene glycol (MIRALAX) 17 GM/SCOOP powder Use ONE CAPEFUL at 10 am and then 2 pm on day prior to procedure 21  Yes Anselmo Mancilla MD   magnesium oxide (MAG-OX) 400 MG tablet TAKE 1 TABLET BY MOUTH EVERY DAY 21  Yes Laina Maynard MD   levothyroxine (SYNTHROID) 75 MCG tablet Take 1 tablet by mouth Daily 21  Yes Laina Maynard MD   hydroCHLOROthiazide (HYDRODIURIL) 25 MG tablet Take 1 tablet by mouth every morning 21  Yes Laina Maynard MD   montelukast (SINGULAIR) 10 MG tablet Take 1 tablet by mouth nightly 21  Yes Laina Maynard MD   folic acid (FOLVITE) 1 MG tablet Take 1 tablet by mouth daily 21  Yes Laina Maynard MD   desloratadine (CLARINEX) 5 MG tablet Take 1 tablet by mouth daily 21  Yes Laina Maynard MD   Lotus Gloss INHUB 100-50 MCG/DOSE diskus inhaler INHALE 1 PUFF AS INSTRUCTED TWICE DAILY. RINSE AND GARGLE MOUTH WITH WATER AFTER EACH USE 21  Yes Laina Maynard MD   albuterol (PROVENTIL) (2.5 MG/3ML) 0.083% nebulizer solution Inhale by nebulizer over 5-15 minutes three (3) to four (4) times a day as needed for cough/wheezing/shortness of breath 21  Yes Laina Maynard MD   albuterol sulfate  (90 Base) MCG/ACT inhaler Inhale 2 puffs into the lungs every 4 hours as needed for Wheezing or Shortness of Breath 12/10/19  Yes Mere Swartz DO   potassium chloride (KLOR-CON M) 20 MEQ extended release tablet 1 pill twice a week. 21   Laina Maynard MD   mometasone (ELOCON) 0.1 % cream Apply topically daily.  21   [facility] MD Caron   conjugated estrogens (PREMARIN) 0.625 MG/GM vaginal cream Use 0.5 g pv 2 to 3 times weekly.  4/28/21   Eleazar Dodge MD       Current medications:    Current Facility-Administered Medications   Medication Dose Route Frequency Provider Last Rate Last Admin    0.9 % sodium chloride infusion  25 mL IntraVENous PRN Arvind Conch, APRN - CNP        lactated ringers infusion   IntraVENous Continuous Arvind Conch, APRN -  mL/hr at 10/20/21 1023 New Bag at 10/20/21 1023    lidocaine PF 1 % injection 1 mL  1 mL IntraDERmal Once PRN Arvind Conch, APRN - CNP        sodium chloride flush 0.9 % injection 10 mL  10 mL IntraVENous 2 times per day Arvind Conch, APRN - CNP        sodium chloride flush 0.9 % injection 10 mL  10 mL IntraVENous PRN Arvind Conch, APRN - CNP        cefOXitin (MEFOXIN) 2000 mg in dextrose 5% 50 mL (mini-bag)  2,000 mg IntraVENous Once Darioderrick Stearns MD           Allergies:  No Known Allergies    Problem List:    Patient Active Problem List   Diagnosis Code    Acquired hypothyroidism E03.9    Allergic conjunctivitis of both eyes H10.13    Closed dislocation of finger of left hand S63.259A    Diverticulosis of sigmoid colon K57.30    Essential hypertension I10    Mild persistent asthma without complication P99.81    Myopia of both eyes H52.13    Seasonal and perennial allergic rhinitis J30.89, J30.2    Stiffness of finger joint, left M25.642    Pelvic mass R19.00       Past Medical History:        Diagnosis Date    Allergic rhinitis     Allergies     Asthma     Closed dislocation of finger of left hand 5/4/2018    Dermoid tumor     Essential hypertension 5/15/2018    Hyperthyroidism     Thyroid disease        Past Surgical History:        Procedure Laterality Date    LASIK Bilateral 05/2021    MANDIBLE SURGERY         Social History:    Social History     Tobacco Use    Smoking status: Former Smoker     Packs/day: 0.25 Years: 5.00     Pack years: 1.25     Types: Cigarettes     Quit date: 1990     Years since quittin.2    Smokeless tobacco: Never Used   Substance Use Topics    Alcohol use: Not Currently                                Counseling given: Not Answered      Vital Signs (Current):   Vitals:    10/20/21 1000   BP: 119/72   Pulse: 51   Resp: 16   Temp: 97.5 °F (36.4 °C)   TempSrc: Temporal   SpO2: 97%   Weight: 176 lb (79.8 kg)   Height: 5' 8\" (1.727 m)                                              BP Readings from Last 3 Encounters:   10/20/21 119/72   10/18/21 115/75   21 130/70       NPO Status: Time of last liquid consumption:                         Time of last solid consumption:                         Date of last liquid consumption: 10/19/21                        Date of last solid food consumption: 10/18/21    BMI:   Wt Readings from Last 3 Encounters:   10/20/21 176 lb (79.8 kg)   10/18/21 176 lb 9.6 oz (80.1 kg)   21 179 lb (81.2 kg)     Body mass index is 26.76 kg/m². CBC:   Lab Results   Component Value Date    WBC 6.2 10/18/2021    RBC 4.76 10/18/2021    HGB 14.0 10/18/2021    HCT 40.5 10/18/2021    MCV 85.0 10/18/2021    RDW 12.6 10/18/2021     10/18/2021       CMP:   Lab Results   Component Value Date     10/18/2021    K 3.7 10/18/2021    CL 86 10/18/2021    CO2 28 10/18/2021    BUN 12 10/18/2021    CREATININE 1.03 10/18/2021    GFRAA >60.0 10/18/2021    LABGLOM 55.7 10/18/2021    GLUCOSE 101 10/18/2021    PROT 7.0 2021    CALCIUM 10.1 10/18/2021    BILITOT 1.2 2021    ALKPHOS 74 2021    AST 19 2021    ALT 9 2021       POC Tests: No results for input(s): POCGLU, POCNA, POCK, POCCL, POCBUN, POCHEMO, POCHCT in the last 72 hours.     Coags:   Lab Results   Component Value Date    PROTIME 13.4 2021    INR 1.0 2021       HCG (If Applicable): No results found for: PREGTESTUR, PREGSERUM, HCG, HCGQUANT     ABGs: No results found for: PHART, PO2ART, ZTY5BGE, MOU3MNM, BEART, T6JYGMXX     Type & Screen (If Applicable):  No results found for: LABABO, LABRH    Drug/Infectious Status (If Applicable):  No results found for: HIV, HEPCAB    COVID-19 Screening (If Applicable): No results found for: COVID19        Anesthesia Evaluation    Airway: Mallampati: II  TM distance: >3 FB   Neck ROM: full  Mouth opening: > = 3 FB Dental:          Pulmonary: breath sounds clear to auscultation  (+) asthma:                            Cardiovascular:  Exercise tolerance: good (>4 METS),   (+) hypertension:,       ECG reviewed  Rhythm: regular             Beta Blocker:  Not on Beta Blocker         Neuro/Psych:   Negative Neuro/Psych ROS              GI/Hepatic/Renal: Neg GI/Hepatic/Renal ROS            Endo/Other:    (+) hypothyroidism::., .                 Abdominal:             Vascular: negative vascular ROS. Other Findings:             Anesthesia Plan      general and regional     ASA 2     (US guided Erector Spinae plane block)  Induction: intravenous. MIPS: Postoperative opioids intended and Prophylactic antiemetics administered. Anesthetic plan and risks discussed with patient. Use of blood products discussed with patient whom consented to blood products. Plan discussed with CRNA.     Attending anesthesiologist reviewed and agrees with Preprocedure content              Tino Romero MD   10/20/2021

## 2021-10-20 NOTE — BRIEF OP NOTE
Brief Postoperative Note      Patient: Katty Chan. [address] [facility]  YOB: 1965  MRN: [id number]    Date of Procedure: 10/20/2021    Pre-Op Diagnosis: RIGHT ADNEXAL MASS 9 cm dermoid cyst     Post-Op Diagnosis: Same       Procedure(s):  LAPARSCOPIC ADNEXAL MASS REMOVAL WITH  RIGHT SALPINGO OOPHORECTOMY; INTRAUTERINE DEVICE REMOVAL    Surgeon(s):  Darline Lehman MD    Assistant:  First Assistant: [address]    Anesthesia: General    Estimated Blood Loss (mL): Minimal    Complications: None    Specimens:   ID Type Source Tests Collected by Time Destination   A : right adnexal mass with right ovary and right fallopian tube Tissue Ovary SURGICAL PATHOLOGY Darline Lehman MD 10/20/2021 1216        Implants:  * No implants in log *      Drains: * No LDAs found *    Findings: consistent with US . A minilap 4-5 cm suprapubic incision was needed to extract mass.      Electronically signed by Darline Lehman MD on 10/20/2021 at 12:40 PM

## 2021-10-20 NOTE — PROGRESS NOTES
Abdomen soft, incisions times three intact with surgical glue, scant amount light red drainage noted on peripad. Lama catheter emptied for 150cc clear yellow urine and emptied per order of Dr Moore Morning. Pt states pain \"is better and tolerable. \".

## 2021-10-20 NOTE — PROGRESS NOTES
CLINICAL PHARMACY NOTE: MEDS TO BEDS    Total # of Prescriptions Filled: 7   The following medications were delivered to the patient:  · Ibuprofen 800mg tab  · Ondansetron 4mg tab  · Oxycodone/APAP 5-325mg tab  · Acetaminophen 500mg tab  · PEG 3350 17gm powder  · Docusate 100mg cap  · Simethicone 80mg chew      Additional Documentation:

## 2021-10-25 ENCOUNTER — TELEPHONE (OUTPATIENT)
Dept: OBGYN CLINIC | Age: 56
End: 2021-10-25

## 2021-10-26 ENCOUNTER — TELEPHONE (OUTPATIENT)
Dept: OBGYN CLINIC | Age: 56
End: 2021-10-26

## 2021-10-26 NOTE — TELEPHONE ENCOUNTER
After surgery pt was only given one week worth of her two week prescription of percocet. She was going to get the refill but the pharmacy said she had to call the office to have them send the second half of her prescription. [facility] pharmacy [place] [facility].

## 2021-10-26 NOTE — TELEPHONE ENCOUNTER
We are not encouraged to prescribe more percocet especially at 1 week postop due to the narcotic epidemic and risk of addiction. if pain is severe patient needs to go to ED .

## 2021-10-27 ENCOUNTER — TELEPHONE (OUTPATIENT)
Dept: PRIMARY CARE CLINIC | Age: 56
End: 2021-10-27

## 2021-10-27 NOTE — TELEPHONE ENCOUNTER
Patient notified of message from Marcus Erickson patient verbalized understanding, she states that she will go to ED if the pain gets worse.

## 2021-10-27 NOTE — TELEPHONE ENCOUNTER
Patient would need to contact whom did her surgary in order to obtain more medication    Correct ?      Please advise

## 2021-10-27 NOTE — TELEPHONE ENCOUNTER
----- Message from [place] MA sent at 10/27/2021 10:26 AM EDT -----  Subject: Message to Provider    QUESTIONS  Information for Provider? Had Sx last week Ovarian Cyst and ovary removal.   had pain pills and only has one left. Rx? Percocet 5. [facility]? [facility]   [address] [place], [place] - [address] [facility] [phone] Deena Walt   [phone]  ---------------------------------------------------------------------------  --------------  Ky Fraction INFO  What is the best way for the office to contact you? OK to leave message on   voicemail  Preferred Call Back Phone Number? [phone]  ---------------------------------------------------------------------------  --------------  SCRIPT ANSWERS  Relationship to Patient?  Self

## 2021-10-28 ENCOUNTER — PATIENT MESSAGE (OUTPATIENT)
Dept: OBGYN CLINIC | Age: 56
End: 2021-10-28

## 2021-10-28 NOTE — TELEPHONE ENCOUNTER
From: Thi Duarte  To: Edenilson Dempsey MD  Sent: 10/28/2021 1:17 PM EDT  Subject: Non-Urgent Medical Question    Hi Dr. Zuniga He,  I just had surgery last Wednesday to get that growth removed from my ovary. I've got a pap test scheduled for 11/4. I really don't want to have to have that done so soon after that. Can I please reschedule? Also, since Dr. Chacho Zapata just was \" there\" do I actually need a pap this time? Just FYI, he removed the IUD during the procedure. Thank you!

## 2021-11-03 ENCOUNTER — OFFICE VISIT (OUTPATIENT)
Dept: OBGYN CLINIC | Age: 56
End: 2021-11-03

## 2021-11-03 VITALS
DIASTOLIC BLOOD PRESSURE: 62 MMHG | SYSTOLIC BLOOD PRESSURE: 100 MMHG | WEIGHT: 180 LBS | HEART RATE: 84 BPM | BODY MASS INDEX: 27.28 KG/M2 | HEIGHT: 68 IN

## 2021-11-03 DIAGNOSIS — Z09 POSTOPERATIVE EXAMINATION: Primary | ICD-10-CM

## 2021-11-03 PROCEDURE — 99024 POSTOP FOLLOW-UP VISIT: CPT | Performed by: OBSTETRICS & GYNECOLOGY

## 2021-11-05 RX ORDER — CLOTRIMAZOLE AND BETAMETHASONE DIPROPIONATE 10; .64 MG/G; MG/G
CREAM TOPICAL
Qty: 1 EACH | Refills: 0 | Status: SHIPPED | OUTPATIENT
Start: 2021-11-05 | End: 2021-12-01 | Stop reason: SDUPTHER

## 2021-11-16 ENCOUNTER — VIRTUAL VISIT (OUTPATIENT)
Dept: PRIMARY CARE CLINIC | Age: 56
End: 2021-11-16
Payer: COMMERCIAL

## 2021-11-16 DIAGNOSIS — R20.0 NUMBNESS AND TINGLING OF BOTH FEET: Primary | ICD-10-CM

## 2021-11-16 DIAGNOSIS — R20.2 NUMBNESS AND TINGLING OF BOTH FEET: Primary | ICD-10-CM

## 2021-11-16 PROCEDURE — 99442 PR PHYS/QHP TELEPHONE EVALUATION 11-20 MIN: CPT | Performed by: INTERNAL MEDICINE

## 2021-11-16 RX ORDER — GABAPENTIN 100 MG/1
100 CAPSULE ORAL 2 TIMES DAILY
Qty: 180 CAPSULE | Refills: 1 | Status: SHIPPED | OUTPATIENT
Start: 2021-11-16 | End: 2021-12-08 | Stop reason: SDUPTHER

## 2021-11-16 NOTE — PROGRESS NOTES
Mimi Appiah is a 49 y.o. female evaluated via telephone on 11/16/2021. Consent:  She and/or health care decision maker is aware that that she may receive a bill for this telephone service, depending on her insurance coverage, and has provided verbal consent to proceed: Yes    Documentation:  I communicated with the patient and/or health care decision maker. Details of this discussion including any medical advice provided:    Pt presents with 4 months tingling and numbness of both feet since 4 months ago. TSH, A1c, B12 normal, HIV negative. No hypoesthesia on previous exam.        I affirm this is a Patient Initiated Episode with a Patient who has not had a related appointment within my department in the past 7 days or scheduled within the next 24 hours. Patient identification was verified at the start of the visit: Yes    Total Time: minutes: 11-20 minutes    The visit was conducted pursuant to the emergency declaration under the [facility], [address] [place] [address] authority and the [employer] General Act. Patient identification was verified, and a caregiver was present when appropriate. The patient was located in a state where the provider was credentialed to provide care.     Note: not billable if this call serves to triage the patient into an appointment for the relevant concern      Orlando Gomes MD

## 2021-11-17 ENCOUNTER — OFFICE VISIT (OUTPATIENT)
Dept: OBGYN CLINIC | Age: 56
End: 2021-11-17
Payer: COMMERCIAL

## 2021-11-17 VITALS — BODY MASS INDEX: 24.07 KG/M2 | WEIGHT: 156 LBS | SYSTOLIC BLOOD PRESSURE: 104 MMHG | DIASTOLIC BLOOD PRESSURE: 72 MMHG

## 2021-11-17 DIAGNOSIS — Z01.419 WELL WOMAN EXAM WITH ROUTINE GYNECOLOGICAL EXAM: Primary | ICD-10-CM

## 2021-11-17 DIAGNOSIS — Z12.31 SCREENING MAMMOGRAM, ENCOUNTER FOR: ICD-10-CM

## 2021-11-17 PROCEDURE — 99396 PREV VISIT EST AGE 40-64: CPT | Performed by: OBSTETRICS & GYNECOLOGY

## 2021-11-17 ASSESSMENT — ENCOUNTER SYMPTOMS
NAUSEA: 0
ABDOMINAL DISTENTION: 0
VOMITING: 0
RECTAL PAIN: 0
ANAL BLEEDING: 0
CONSTIPATION: 0
RESPIRATORY NEGATIVE: 1
EYES NEGATIVE: 1
DIARRHEA: 0
BLOOD IN STOOL: 0
ALLERGIC/IMMUNOLOGIC NEGATIVE: 1
ABDOMINAL PAIN: 0

## 2021-11-17 ASSESSMENT — VISUAL ACUITY: OU: 1

## 2021-11-17 NOTE — PROGRESS NOTES
Subjective:      Patient ID: Giovani Hernandez Person is a 49 y.o. female    Annual exam.  No PMB. No GYN complaints. Pap deferred ( negative 2020 )  and screening mammogram ordered. Monthly SBE encouraged. Screening colonoscopy recommended per routine. F/U annual exam or prn. Vitals:  /72   Wt 156 lb (70.8 kg)   LMP  (LMP Unknown)   BMI 24.07 kg/m²   Past Medical History:   Diagnosis Date    Allergic rhinitis     Allergies     Asthma     Closed dislocation of finger of left hand 5/4/2018    Dermoid tumor     Essential hypertension 5/15/2018    Hyperthyroidism     Thyroid disease      Past Surgical History:   Procedure Laterality Date    LAPAROSCOPY Right 10/20/2021    LAPARSCOPIC ADNEXAL MASS REMOVAL WITH  RIGHT SALPINGO OOPHORECTOMY; INTRAUTERINE DEVICE REMOVAL performed by Marck Gillespie MD at [address] [place] [address] Bilateral 05/2021    MANDIBLE SURGERY       Allergies:  Patient has no known allergies. Current Outpatient Medications   Medication Sig Dispense Refill    gabapentin (NEURONTIN) 100 MG capsule Take 1 capsule by mouth 2 times daily for 180 days. Intended supply: 90 days 180 capsule 1    clotrimazole-betamethasone (LOTRISONE) 1-0.05 % cream Apply topically 2 times daily. 1 each 0    potassium chloride (KLOR-CON M) 20 MEQ extended release tablet 1 pill twice a week. 30 tablet 2    mometasone (ELOCON) 0.1 % cream Apply topically daily. 1 Tube 1    magnesium oxide (MAG-OX) 400 MG tablet TAKE 1 TABLET BY MOUTH EVERY DAY 90 tablet 3    levothyroxine (SYNTHROID) 75 MCG tablet Take 1 tablet by mouth Daily 90 tablet 3    hydroCHLOROthiazide (HYDRODIURIL) 25 MG tablet Take 1 tablet by mouth every morning 90 tablet 3    conjugated estrogens (PREMARIN) 0.625 MG/GM vaginal cream Use 0.5 g pv 2 to 3 times weekly.  3 Tube 3    montelukast (SINGULAIR) 10 MG tablet Take 1 tablet by mouth nightly 90 tablet 3    folic acid (FOLVITE) 1 MG tablet Take 1 tablet by mouth daily 90 tablet 3    desloratadine (CLARINEX) 5 MG tablet Take 1 tablet by mouth daily 90 tablet 3    WIXELA INHUB 100-50 MCG/DOSE diskus inhaler INHALE 1 PUFF AS INSTRUCTED TWICE DAILY. RINSE AND GARGLE MOUTH WITH WATER AFTER EACH USE 60 each 5    albuterol (PROVENTIL) (2.5 MG/3ML) 0.083% nebulizer solution Inhale by nebulizer over 5-15 minutes three (3) to four (4) times a day as needed for cough/wheezing/shortness of breath 120 each 5    albuterol sulfate  (90 Base) MCG/ACT inhaler Inhale 2 puffs into the lungs every 4 hours as needed for Wheezing or Shortness of Breath 1 Inhaler 1     No current facility-administered medications for this visit. Social History     Socioeconomic History    Marital status:      Spouse name: Mulugeta Rinaldi Number of children: 0    Years of education: 23    Highest education level: Professional school degree (e.g., MD, DDS, DVM, VIVIENNE)   Occupational History    Occupation: retired   Tobacco Use    Smoking status: Former Smoker     Packs/day: 0.25     Years: 5.00     Pack years: 1.25     Types: Cigarettes     Quit date: 1990     Years since quittin.3    Smokeless tobacco: Never Used   Vaping Use    Vaping Use: Never used   Substance and Sexual Activity    Alcohol use: Not Currently    Drug use: Not Currently    Sexual activity: Yes     Partners: Male     Comment:  and monogamous   Other Topics Concern    Not on file   Social History Narrative    Not on file     Social Determinants of Health     Financial Resource Strain: Low Risk     Difficulty of Paying Living Expenses: Not very hard   Food Insecurity: No Food Insecurity    Worried About Running Out of Food in the Last Year: Never true    [place] of Food in the Last Year: Never true   Transportation Needs:     Lack of Transportation (Medical): Not on file    Lack of Transportation (Non-Medical):  Not on file   Physical Activity:     Days of Exercise per Week: Not on file    Minutes of Exercise per Session: Not on file   Stress:     Feeling of Stress : Not on file   Social Connections:     Frequency of Communication with Friends and Family: Not on file    Frequency of Social Gatherings with Friends and Family: Not on file    Attends Buddhist Services: Not on file    Active Member of Clubs or Organizations: Not on file    Attends Club or Organization Meetings: Not on file    Marital Status: Not on file   Intimate Partner Violence:     Fear of Current or Ex-Partner: Not on file    Emotionally Abused: Not on file    Physically Abused: Not on file    Sexually Abused: Not on file   Housing Stability:     Unable to Pay for Housing in the Last Year: Not on file    Number of Places Lived in the Last Year: Not on file    Unstable Housing in the Last Year: Not on file     Family History   Problem Relation Age of Onset    Anemia Mother     Asthma Mother     Obesity Mother     Alcohol Abuse Neg Hx     Allergy (Severe) Neg Hx     Arthritis Neg Hx     Arrhythmia Neg Hx     Atrial Fibrillation Neg Hx     Birth Defects Neg Hx     Breast Cancer Neg Hx     Cancer Neg Hx     Coronary Art Dis Neg Hx     Colon Cancer Neg Hx     Depression Neg Hx     Diabetes Neg Hx     Early Death Neg Hx     Hearing Loss Neg Hx     Heart Attack Neg Hx     Heart Disease Neg Hx     High Blood Pressure Neg Hx     High Cholesterol Neg Hx     Kidney Disease Neg Hx     Learning Disabilities Neg Hx     Mental Illness Neg Hx     Mental Retardation Neg Hx     Miscarriages / Stillbirths Neg Hx     Osteoporosis Neg Hx     Prostate Cancer Neg Hx     Stroke Neg Hx     Substance Abuse Neg Hx     Vision Loss Neg Hx        Review of Systems   Constitutional: Negative. Negative for activity change, appetite change, chills, diaphoresis, fatigue, fever and unexpected weight change. HENT: Negative. Eyes: Negative. Respiratory: Negative. Cardiovascular: Negative.     Gastrointestinal: Negative for abdominal distention, abdominal pain, anal bleeding, blood in stool, constipation, diarrhea, nausea, rectal pain and vomiting. Endocrine: Negative. Genitourinary: Negative for decreased urine volume, difficulty urinating, dyspareunia, dysuria, enuresis, flank pain, frequency, genital sores, hematuria, menstrual problem, pelvic pain, urgency, vaginal bleeding, vaginal discharge and vaginal pain. Musculoskeletal: Negative. Skin: Negative. Allergic/Immunologic: Negative. Neurological: Negative. Hematological: Negative. Psychiatric/Behavioral: Negative. Objective:     Physical Exam  Constitutional:       Appearance: She is well-developed. HENT:      Head: Normocephalic. Eyes:      General: Lids are normal. Vision grossly intact. Neck:      Thyroid: No thyromegaly. Cardiovascular:      Rate and Rhythm: Normal rate and regular rhythm. Heart sounds: Normal heart sounds. Pulmonary:      Effort: Pulmonary effort is normal. No respiratory distress. Breath sounds: Normal breath sounds. No wheezing or rales. Chest:      Chest wall: No tenderness. Breasts:      Right: Normal. No swelling, bleeding, inverted nipple, mass, nipple discharge, skin change, tenderness, axillary adenopathy or supraclavicular adenopathy. Left: Normal. No swelling, bleeding, inverted nipple, mass, nipple discharge, skin change, tenderness, axillary adenopathy or supraclavicular adenopathy. Abdominal:      General: There is no distension. Palpations: Abdomen is soft. There is no mass. Tenderness: There is no abdominal tenderness. There is no guarding or rebound. Hernia: No hernia is present. There is no hernia in the left inguinal area or right inguinal area. Genitourinary:     General: Normal vulva. Pubic Area: No rash. Labia:         Right: No rash, tenderness, lesion or injury. Left: No rash, tenderness, lesion or injury.        Urethra: No prolapse, urethral swelling or urethral lesion. Vagina: Normal. No signs of injury and foreign body. No vaginal discharge, erythema, tenderness or bleeding. Cervix: No cervical motion tenderness, discharge or friability. Uterus: Not deviated, not enlarged, not fixed and not tender. Adnexa:         Right: No mass, tenderness or fullness. Left: No mass, tenderness or fullness. Rectum: Normal.   Musculoskeletal:         General: No tenderness. Normal range of motion. Cervical back: Normal range of motion and neck supple. Lymphadenopathy:      Cervical: No cervical adenopathy. Upper Body:      Right upper body: No supraclavicular or axillary adenopathy. Left upper body: No supraclavicular or axillary adenopathy. Lower Body: No right inguinal adenopathy. No left inguinal adenopathy. Skin:     General: Skin is warm and dry. Coloration: Skin is not pale. Findings: No erythema or rash. Neurological:      Mental Status: She is alert and oriented to person, place, and time. Psychiatric:         Behavior: Behavior normal.         Thought Content: Thought content normal.         Judgment: Judgment normal.         Assessment:       Diagnosis Orders   1. Well woman exam with routine gynecological exam     2. Screening mammogram, encounter for  BIN DIGITAL SCREEN W OR WO CAD BILATERAL        Plan:      Medications placedthis encounter:  No orders of the defined types were placed in this encounter. Orders placedthis encounter:  Orders Placed This Encounter   Procedures    BIN DIGITAL SCREEN W OR WO CAD BILATERAL     Standing Status:   Future     Standing Expiration Date:   11/17/2022         Follow up:  Return in about 1 year (around 11/17/2022) for Annual.   Repeat Annual GYN exam every 1 year. Cervical Cytology exam starts age 24. If Negative Cytology;  follow-up screening per current guidelines. Mammograms yearly starting at age 36.     Calcium and Vitamin D dosing reviewed ( age appropriate ). Colonoscopy and bone density screening discussed ( age appropriate ). Birth control and STD prevention discussed ( age appropriate ). Gardisil counseling completed for all patients ( age appropriate ). Routine health maintenance ( per PCP and guidelines ).

## 2021-12-01 DIAGNOSIS — E03.9 ACQUIRED HYPOTHYROIDISM: ICD-10-CM

## 2021-12-01 DIAGNOSIS — E87.6 HYPOKALEMIA: ICD-10-CM

## 2021-12-01 DIAGNOSIS — I10 ESSENTIAL HYPERTENSION: ICD-10-CM

## 2021-12-01 RX ORDER — CONJUGATED ESTROGENS 0.62 MG/G
CREAM VAGINAL
Qty: 3 EACH | Refills: 3 | Status: SHIPPED | OUTPATIENT
Start: 2021-12-01

## 2021-12-01 NOTE — TELEPHONE ENCOUNTER
Requesting medication refill. Please approve or deny this request.    Rx requested:  Requested Prescriptions     Pending Prescriptions Disp Refills    levothyroxine (SYNTHROID) 75 MCG tablet 90 tablet 3     Sig: Take 1 tablet by mouth Daily    hydroCHLOROthiazide (HYDRODIURIL) 25 MG tablet 90 tablet 3     Sig: Take 1 tablet by mouth every morning    magnesium oxide (MAG-OX) 400 MG tablet 90 tablet 3     Sig: TAKE 1 TABLET BY MOUTH EVERY DAY    potassium chloride (KLOR-CON M) 20 MEQ extended release tablet 30 tablet 2     Si pill twice a week. Last Office Visit:   2021    Last Filled:      Last Labs:      Next Visit Date:  No future appointments.

## 2021-12-02 DIAGNOSIS — R21 RASH: ICD-10-CM

## 2021-12-02 RX ORDER — POTASSIUM CHLORIDE 20 MEQ/1
TABLET, EXTENDED RELEASE ORAL
Qty: 30 TABLET | Refills: 2 | Status: SHIPPED | OUTPATIENT
Start: 2021-12-02 | End: 2022-06-19

## 2021-12-02 RX ORDER — HYDROCHLOROTHIAZIDE 25 MG/1
25 TABLET ORAL EVERY MORNING
Qty: 90 TABLET | Refills: 3 | Status: SHIPPED | OUTPATIENT
Start: 2021-12-02

## 2021-12-02 RX ORDER — MOMETASONE FUROATE 1 MG/G
CREAM TOPICAL
Qty: 1 EACH | Refills: 2 | Status: SHIPPED | OUTPATIENT
Start: 2021-12-02 | End: 2022-05-20

## 2021-12-02 RX ORDER — MAGNESIUM OXIDE 400 MG/1
TABLET ORAL
Qty: 90 TABLET | Refills: 3 | Status: SHIPPED | OUTPATIENT
Start: 2021-12-02

## 2021-12-02 RX ORDER — LEVOTHYROXINE SODIUM 0.07 MG/1
75 TABLET ORAL DAILY
Qty: 90 TABLET | Refills: 3 | Status: SHIPPED | OUTPATIENT
Start: 2021-12-02

## 2021-12-02 NOTE — TELEPHONE ENCOUNTER
Requesting medication refill. Please approve or deny this request.    Rx requested:  Requested Prescriptions     Pending Prescriptions Disp Refills    mometasone (ELOCON) 0.1 % cream       Sig: Apply topically daily. Last Office Visit:   11/16/2021    Last Filled:      Last Labs:      Next Visit Date:  No future appointments.

## 2021-12-03 RX ORDER — CLOTRIMAZOLE AND BETAMETHASONE DIPROPIONATE 10; .64 MG/G; MG/G
CREAM TOPICAL
Qty: 1 EACH | Refills: 0 | Status: SHIPPED | OUTPATIENT
Start: 2021-12-03

## 2021-12-08 DIAGNOSIS — R20.0 NUMBNESS AND TINGLING OF BOTH FEET: ICD-10-CM

## 2021-12-08 DIAGNOSIS — R20.2 NUMBNESS AND TINGLING OF BOTH FEET: ICD-10-CM

## 2021-12-08 RX ORDER — GABAPENTIN 100 MG/1
100 CAPSULE ORAL 3 TIMES DAILY
Qty: 270 CAPSULE | Refills: 2 | Status: SHIPPED | OUTPATIENT
Start: 2021-12-08 | End: 2021-12-09 | Stop reason: SDUPTHER

## 2021-12-09 ENCOUNTER — OFFICE VISIT (OUTPATIENT)
Dept: GASTROENTEROLOGY | Age: 56
End: 2021-12-09
Payer: COMMERCIAL

## 2021-12-09 VITALS
WEIGHT: 178 LBS | DIASTOLIC BLOOD PRESSURE: 60 MMHG | BODY MASS INDEX: 27.47 KG/M2 | SYSTOLIC BLOOD PRESSURE: 114 MMHG | OXYGEN SATURATION: 98 % | RESPIRATION RATE: 12 BRPM | HEART RATE: 85 BPM

## 2021-12-09 DIAGNOSIS — R20.2 NUMBNESS AND TINGLING OF BOTH FEET: Primary | ICD-10-CM

## 2021-12-09 DIAGNOSIS — R20.0 NUMBNESS AND TINGLING OF BOTH FEET: Primary | ICD-10-CM

## 2021-12-09 DIAGNOSIS — K70.9 ALCOHOLIC LIVER DISEASE (HCC): ICD-10-CM

## 2021-12-09 DIAGNOSIS — K70.9 ALCOHOLIC LIVER DISEASE (HCC): Primary | ICD-10-CM

## 2021-12-09 LAB
ALBUMIN SERPL-MCNC: 4.4 G/DL (ref 3.5–4.6)
ALP BLD-CCNC: 90 U/L (ref 40–130)
ALT SERPL-CCNC: 14 U/L (ref 0–33)
ANION GAP SERPL CALCULATED.3IONS-SCNC: 14 MEQ/L (ref 9–15)
AST SERPL-CCNC: 23 U/L (ref 0–35)
BILIRUB SERPL-MCNC: 1.1 MG/DL (ref 0.2–0.7)
BUN BLDV-MCNC: 14 MG/DL (ref 6–20)
CALCIUM SERPL-MCNC: 9.5 MG/DL (ref 8.5–9.9)
CHLORIDE BLD-SCNC: 94 MEQ/L (ref 95–107)
CO2: 25 MEQ/L (ref 20–31)
CREAT SERPL-MCNC: 0.9 MG/DL (ref 0.5–0.9)
GFR AFRICAN AMERICAN: >60
GFR NON-AFRICAN AMERICAN: >60
GLOBULIN: 3.2 G/DL (ref 2.3–3.5)
GLUCOSE BLD-MCNC: 125 MG/DL (ref 70–99)
HCT VFR BLD CALC: 37.2 % (ref 37–47)
HEMOGLOBIN: 12.7 G/DL (ref 12–16)
INR BLD: 1.1
MCH RBC QN AUTO: 29.3 PG (ref 27–31.3)
MCHC RBC AUTO-ENTMCNC: 34.2 % (ref 33–37)
MCV RBC AUTO: 85.7 FL (ref 82–100)
PDW BLD-RTO: 14 % (ref 11.5–14.5)
PLATELET # BLD: 255 K/UL (ref 130–400)
POTASSIUM SERPL-SCNC: 3.9 MEQ/L (ref 3.4–4.9)
PROTHROMBIN TIME: 13.7 SEC (ref 12.3–14.9)
RBC # BLD: 4.34 M/UL (ref 4.2–5.4)
SODIUM BLD-SCNC: 133 MEQ/L (ref 135–144)
TOTAL PROTEIN: 7.6 G/DL (ref 6.3–8)
WBC # BLD: 5 K/UL (ref 4.8–10.8)

## 2021-12-09 PROCEDURE — 99214 OFFICE O/P EST MOD 30 MIN: CPT | Performed by: INTERNAL MEDICINE

## 2021-12-09 RX ORDER — GABAPENTIN 100 MG/1
200 CAPSULE ORAL 3 TIMES DAILY
Qty: 360 CAPSULE | Refills: 2 | Status: SHIPPED | OUTPATIENT
Start: 2021-12-09 | End: 2022-03-29 | Stop reason: SDUPTHER

## 2021-12-09 ASSESSMENT — ENCOUNTER SYMPTOMS
DIARRHEA: 0
CHEST TIGHTNESS: 0
TROUBLE SWALLOWING: 0
EYE REDNESS: 0
NAUSEA: 0
BLOOD IN STOOL: 0
EYE PAIN: 0
PHOTOPHOBIA: 0
SHORTNESS OF BREATH: 0
VOMITING: 0
ABDOMINAL PAIN: 0
WHEEZING: 0
VOICE CHANGE: 0
COLOR CHANGE: 0
RECTAL PAIN: 0
ABDOMINAL DISTENTION: 0
CONSTIPATION: 0

## 2021-12-09 NOTE — PROGRESS NOTES
Subjective:      Patient ID: Brice Campos is a 49 y.o. female who presents today for:  Chief Complaint   Patient presents with    Numbness       HPI   Patient came into the follow-up visit. Since last visit, patient had surgery for right adnexal mass. Pathology showed ovarian  cystic teratoma. Patient tolerated the procedure with no decompensation postop. Otherwise patient came in today for the evaluation and management. Endorses bilateral numbness in both feet. Hematemesis, no hematochezia. No increased abdominal girth   previous visit  Patient came in today for follow-up visit. Since the initial diagnosis patient has been abstinent from alcohol with last alcohol use was in May 2021. Patient also been following with gynecology team regarding right adnexal mass. 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 Denies any increased abdominal girth. Denies any hematemesis melena hematochezia. No recent EGD. Had previous EGD years ago following food impaction. No repeat EGD since. Patient came in today for follow-up visit.   Background  This is a very pleasant 28-year-old who came in today for the evaluation management of cirrhosis.  Etiology of cirrhosis thought to be related to alcohol.  Patient was diagnosed with cirrhosis on previous imaging almost 2 years ago she has been abstinent alcohol of over the last 2 months.  Patient denies any history of hematemesis melena hematochezia no previous study of esophageal variceal bleed.  Patient mentioned that she had previous EGD long time ago following the diagnosis of food impaction.  Patient denies increased abdominal girth though she reports that she gained 10 pounds over the last few weeks.  Denies any hospitalization related to confusion change in mental status/liver condition.  Denies any jaundice.  Patient mentioned that she is about to have imaging of her liver MRI abdomen  Past Medical History:   Diagnosis Date    Allergic rhinitis     Allergies     Asthma     Closed dislocation of finger of left hand 2018    Dermoid tumor     Essential hypertension 5/15/2018    Hyperthyroidism     Thyroid disease      Past Surgical History:   Procedure Laterality Date    LAPAROSCOPY Right 10/20/2021    LAPARSCOPIC ADNEXAL MASS REMOVAL WITH  RIGHT SALPINGO OOPHORECTOMY; INTRAUTERINE DEVICE REMOVAL performed by Treas Crockett MD at [address] LAS[place] Bilateral 2021    MANDIBLE SURGERY       Social History     Socioeconomic History    Marital status:      Spouse name: Cas Montesinos Number of children: 0    Years of education: 23    Highest education level: Professional school degree (e.g., MD, DDS, DVM, VIVIENNE)   Occupational History    Occupation: retired   Tobacco Use    Smoking status: Former Smoker     Packs/day: 0.25     Years: 5.00     Pack years: 1.25     Types: Cigarettes     Quit date: 1990     Years since quittin.4    Smokeless tobacco: Never Used   Vaping Use    Vaping Use: Never used   Substance and Sexual Activity    Alcohol use: Not Currently    Drug use: Not Currently    Sexual activity: Yes     Partners: Male     Comment:  and monogamous   Other Topics Concern    Not on file   Social History Narrative    Not on file     Social Determinants of Health     Financial Resource Strain: Low Risk     Difficulty of Paying Living Expenses: Not very hard   Food Insecurity: No Food Insecurity    Worried About Running Out of Food in the Last Year: Never true    [place] of Food in the Last Year: Never true   Transportation Needs:     Lack of Transportation (Medical): Not on file    Lack of Transportation (Non-Medical):  Not on file   Physical Activity:     Days of Exercise per Week: Not on file    Minutes of Exercise per Session: Not on file   Stress:     Feeling of Stress : Not on file   Social Connections:     Frequency of Communication with Friends and Family: Not on file    Frequency of Social Gatherings with Friends and Family: Not on file    Attends Evangelical Services: Not on file    Active Member of Clubs or Organizations: Not on file    Attends Club or Organization Meetings: Not on file    Marital Status: Not on file   Intimate Partner Violence:     Fear of Current or Ex-Partner: Not on file    Emotionally Abused: Not on file    Physically Abused: Not on file    Sexually Abused: Not on file   Housing Stability:     Unable to Pay for Housing in the Last Year: Not on file    Number of Places Lived in the Last Year: Not on file    Unstable Housing in the Last Year: Not on file     Family History   Problem Relation Age of Onset    Anemia Mother     Asthma Mother     Obesity Mother     Alcohol Abuse Neg Hx     Allergy (Severe) Neg Hx     Arthritis Neg Hx     Arrhythmia Neg Hx     Atrial Fibrillation Neg Hx     Birth Defects Neg Hx     Breast Cancer Neg Hx     Cancer Neg Hx     Coronary Art Dis Neg Hx     Colon Cancer Neg Hx     Depression Neg Hx     Diabetes Neg Hx     Early Death Neg Hx     Hearing Loss Neg Hx     Heart Attack Neg Hx     Heart Disease Neg Hx     High Blood Pressure Neg Hx     High Cholesterol Neg Hx     Kidney Disease Neg Hx     Learning Disabilities Neg Hx     Mental Illness Neg Hx     Mental Retardation Neg Hx     Miscarriages / Stillbirths Neg Hx     Osteoporosis Neg Hx     Prostate Cancer Neg Hx     Stroke Neg Hx     Substance Abuse Neg Hx     Vision Loss Neg Hx      No Known Allergies      Review of Systems   Constitutional: Negative for appetite change, chills, fatigue, fever and unexpected weight change. HENT: Negative for nosebleeds, tinnitus, trouble swallowing and voice change. Eyes: Negative for photophobia, pain and redness. Respiratory: Negative for chest tightness, shortness of breath and wheezing. Cardiovascular: Negative for chest pain, palpitations and leg swelling.    Gastrointestinal: Negative for abdominal distention, abdominal pain, blood in stool, constipation, diarrhea, nausea, rectal pain and vomiting. Endocrine: Negative for polydipsia, polyphagia and polyuria. Genitourinary: Negative for difficulty urinating and hematuria. Skin: Negative for color change, pallor and rash. Neurological: Positive for numbness. Negative for dizziness, speech difficulty and headaches. Psychiatric/Behavioral: Negative for confusion and suicidal ideas. Objective:   /60 (Site: Right Upper Arm, Position: Sitting, Cuff Size: Small Adult)   Pulse 85   Resp 12   Wt 178 lb (80.7 kg)   LMP  (LMP Unknown)   SpO2 98%   BMI 27.47 kg/m²     Physical Exam  Constitutional:       General: She is not in acute distress. Appearance: She is well-developed. HENT:      Head: Normocephalic and atraumatic. Eyes:      Conjunctiva/sclera: Conjunctivae normal.      Pupils: Pupils are equal, round, and reactive to light. Cardiovascular:      Rate and Rhythm: Normal rate and regular rhythm. Heart sounds: Normal heart sounds. Pulmonary:      Effort: Pulmonary effort is normal. No respiratory distress. Breath sounds: Normal breath sounds. No wheezing or rales. Abdominal:      General: Bowel sounds are normal. There is no distension. Palpations: Abdomen is soft. Abdomen is not rigid. There is no hepatomegaly, splenomegaly or mass. Tenderness: There is no abdominal tenderness. There is no guarding or rebound. Musculoskeletal:         General: No tenderness or deformity. Normal range of motion. Cervical back: Neck supple. Skin:     Coloration: Skin is not pale. Findings: No erythema or rash. Neurological:      Mental Status: She is alert and oriented to person, place, and time.          Laboratory, Pathology, Radiology reviewed in detail with relevantimportant investigations summarized below:  Lab Results   Component Value Date    WBC 6.2 10/18/2021    WBC 3.9 08/13/2021    WBC 4.7 12/10/2019    HGB 14.0 10/18/2021    HGB 13.5 08/13/2021    HGB 14.1 12/10/2019    HCT 40.5 10/18/2021    HCT 39.0 08/13/2021    HCT 41.8 12/10/2019    MCV 85.0 10/18/2021    MCV 90.4 08/13/2021    MCV 94.6 12/10/2019     10/18/2021     08/13/2021     12/10/2019    . Lab Results   Component Value Date    ALT 9 08/13/2021    ALT 12 07/29/2021    ALT 14 07/19/2021    AST 19 08/13/2021    AST 19 07/29/2021    AST 21 07/19/2021     07/19/2021    ALKPHOS 74 08/13/2021    ALKPHOS 74 07/29/2021    ALKPHOS 82 07/19/2021    BILITOT 1.2 08/13/2021    BILITOT 1.5 07/29/2021    BILITOT 2.3 07/19/2021       No results found. Lab Results   Component Value Date    IRON 98 08/13/2021    TIBC 330 08/13/2021    FERRITIN 182.2 08/13/2021     Lab Results   Component Value Date    INR 1.0 08/13/2021     No components found for: ACUTEHEPATITISSCREEN  No components found for: CELIACPANEL  No components found for: STOOLCULTURE, C.DIFF, STOOLOVAPARASITE, STOOLLEUCOCYTE        Assessment:    1-Alcoholic liver disease/cirrhosis secondary to ETOH  :  Patient continues to be abstinent from alcohol. Will update blood work and lab data accordingly  Since last visit, surgery for right adnexal mass. Pathology showed ovarian  cystic teratoma. Patient tolerated the procedure with no decompensation postop  2 -  Ascites:   Not clinically significant  3- EGD for Variceal surveillance:    We will proceed with upper endoscopy to assess for esophageal varices  Reports a history of food impaction  4 - [address] screening:   MRI reviewed with no evidence of liver mass. Noted recanalization of the umbilical vein that may raise question regarding portal hypertension.   Noted however normal spleen size and platelet count  5-  Overt Hepatic encephalopathy:   None at this time  6- Nutrition :   Continue multivitamin / Nutritional  support   Given the numbness will check nutritional deficiency vitamin B12, folate and zinc.  Patient does report taking multivitamins. Patient has been initiated on gabapentin. Discussed with patient regarding neurological evaluation. Patient is continue follow-up with primary care physician accordingly       Return in about 6 weeks (around 1/20/2022) for Post procedure results discussion, further management.       Florinda Cushing, MD

## 2021-12-10 LAB
FOLATE: >20 NG/ML
VITAMIN B-12: 615 PG/ML (ref 232–1245)

## 2021-12-14 DIAGNOSIS — R20.2 NUMBNESS AND TINGLING OF BOTH FEET: ICD-10-CM

## 2021-12-14 DIAGNOSIS — R20.0 NUMBNESS AND TINGLING OF BOTH FEET: ICD-10-CM

## 2021-12-14 LAB — ZINC: 91.4 UG/DL (ref 60–120)

## 2021-12-15 ENCOUNTER — OFFICE VISIT (OUTPATIENT)
Dept: NEUROLOGY | Age: 56
End: 2021-12-15
Payer: COMMERCIAL

## 2021-12-15 VITALS
WEIGHT: 180 LBS | DIASTOLIC BLOOD PRESSURE: 72 MMHG | HEART RATE: 88 BPM | BODY MASS INDEX: 27.78 KG/M2 | SYSTOLIC BLOOD PRESSURE: 122 MMHG

## 2021-12-15 DIAGNOSIS — G62.9 PERIPHERAL POLYNEUROPATHY: ICD-10-CM

## 2021-12-15 LAB
CHOLESTEROL, TOTAL: 172 MG/DL (ref 0–199)
HDLC SERPL-MCNC: 50 MG/DL (ref 40–59)
LDL CHOLESTEROL CALCULATED: 99 MG/DL (ref 0–129)
TRIGL SERPL-MCNC: 117 MG/DL (ref 0–150)

## 2021-12-15 PROCEDURE — 99204 OFFICE O/P NEW MOD 45 MIN: CPT

## 2021-12-15 RX ORDER — LANOLIN ALCOHOL/MO/W.PET/CERES
100 CREAM (GRAM) TOPICAL DAILY
Qty: 30 TABLET | Refills: 3 | Status: SHIPPED | OUTPATIENT
Start: 2021-12-15 | End: 2022-01-14 | Stop reason: SDUPTHER

## 2021-12-15 NOTE — PROGRESS NOTES
[facility] Neurology Outpatient Consult Note   Name: Gabriela Duarte  Age: 49 y.o. Gender: female  Primary Care Provider: Orlando Gomes MD        Chief Complaint:New Patient (Pt says that she has numbness and tingling in her feet. Onset of this has been since about May. Pt says that she used to drink a lot of alcohol and did damage to her liver, and is no longer drinking she says that the pcp think that may have something to do with it. )      HPI: New patient describing painful squeezing numbness in both her feet in a stocking patchy pattern. Onset about 5 months ago. She has not had any falls. No associated weakness. Tends to be worse at night. Since she was initiated on the gabapentin she is not having nighttime worsening anymore. She is not a known diabetic. No dietary changes preceding this. She did actually stop drinking alcohol in May. Previously was having approximately 5-6 mixed drinks per night. No history of B12 deficiency. No history of malignancy. 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 So far she has been worked up with a hemoglobin A1c which was normal at 5.7%. B12 level was normal.  HIV testing was negative. She is known for alcohol related hepatic dysfunction which is being worked up. Her gabapentin has been  slowly titrated  to the current dose of 2-3 times a day with good effect.        Patient Active Problem List   Diagnosis    Acquired hypothyroidism    Allergic conjunctivitis of both eyes    Closed dislocation of finger of left hand    Diverticulosis of sigmoid colon    Essential hypertension    Mild persistent asthma without complication    Myopia of both eyes    Seasonal and perennial allergic rhinitis    Stiffness of finger joint, left    Pelvic mass    Dermoid cyst    Peripheral neuropathy   etoh-related hepatic dysfunction    Past Medical History:   Diagnosis Date  Allergic rhinitis     Allergies     Asthma     Closed dislocation of finger of left hand 5/4/2018    Dermoid tumor     Essential hypertension 5/15/2018    Hyperthyroidism     Thyroid disease        Medications:  Reviewed    Current Outpatient Medications   Medication Sig Dispense Refill    thiamine 100 MG tablet Take 1 tablet by mouth daily 30 tablet 3    triazolam (HALCION) 0.25 MG tablet       gabapentin (NEURONTIN) 100 MG capsule Take 2 capsules by mouth 3 times daily for 90 days. Intended supply: 90 days 360 capsule 2    clotrimazole-betamethasone (LOTRISONE) 1-0.05 % cream Apply topically 2 times daily. 1 each 0    levothyroxine (SYNTHROID) 75 MCG tablet Take 1 tablet by mouth Daily 90 tablet 3    hydroCHLOROthiazide (HYDRODIURIL) 25 MG tablet Take 1 tablet by mouth every morning 90 tablet 3    magnesium oxide (MAG-OX) 400 MG tablet TAKE 1 TABLET BY MOUTH EVERY DAY 90 tablet 3    potassium chloride (KLOR-CON M) 20 MEQ extended release tablet 1 pill twice a week. 30 tablet 2    mometasone (ELOCON) 0.1 % cream Apply topically daily. 1 each 2    conjugated estrogens (PREMARIN) 0.625 MG/GM vaginal cream Use 0.5 g pv 2 to 3 times weekly. 3 each 3    montelukast (SINGULAIR) 10 MG tablet Take 1 tablet by mouth nightly 90 tablet 3    folic acid (FOLVITE) 1 MG tablet Take 1 tablet by mouth daily 90 tablet 3    desloratadine (CLARINEX) 5 MG tablet Take 1 tablet by mouth daily 90 tablet 3    WIXELA INHUB 100-50 MCG/DOSE diskus inhaler INHALE 1 PUFF AS INSTRUCTED TWICE DAILY.  RINSE AND GARGLE MOUTH WITH WATER AFTER EACH USE 60 each 5    albuterol (PROVENTIL) (2.5 MG/3ML) 0.083% nebulizer solution Inhale by nebulizer over 5-15 minutes three (3) to four (4) times a day as needed for cough/wheezing/shortness of breath 120 each 5    albuterol sulfate  (90 Base) MCG/ACT inhaler Inhale 2 puffs into the lungs every 4 hours as needed for Wheezing or Shortness of Breath 1 Inhaler 1     No current facility-administered medications for this visit. No Known Allergies    Family History   Problem Relation Age of Onset    Anemia Mother     Asthma Mother     Obesity Mother     Alcohol Abuse Neg Hx     Allergy (Severe) Neg Hx     Arthritis Neg Hx     Arrhythmia Neg Hx     Atrial Fibrillation Neg Hx     Birth Defects Neg Hx     Breast Cancer Neg Hx     Cancer Neg Hx     Coronary Art Dis Neg Hx     Colon Cancer Neg Hx     Depression Neg Hx     Diabetes Neg Hx     Early Death Neg Hx     Hearing Loss Neg Hx     Heart Attack Neg Hx     Heart Disease Neg Hx     High Blood Pressure Neg Hx     High Cholesterol Neg Hx     Kidney Disease Neg Hx     Learning Disabilities Neg Hx     Mental Illness Neg Hx     Mental Retardation Neg Hx     Miscarriages / Stillbirths Neg Hx     Osteoporosis Neg Hx     Prostate Cancer Neg Hx     Stroke Neg Hx     Substance Abuse Neg Hx     Vision Loss Neg Hx        Past Surgical History:   Procedure Laterality Date    LAPAROSCOPY Right 10/20/2021    LAPARSCOPIC ADNEXAL MASS REMOVAL WITH  RIGHT SALPINGO OOPHORECTOMY; INTRAUTERINE DEVICE REMOVAL performed by Emmie Gorman MD at [address] [place] [address] LASIK Bilateral 05/2021    MANDIBLE SURGERY          Social History     Tobacco History     Smoking Status  Former Smoker Quit date  7/19/1990 Smoking Frequency  0.25 packs/day for 5 years (1.25 pk yrs) Smoking Tobacco Type  Cigarettes    Smokeless Tobacco Use  Never Used          Alcohol History     Alcohol Use Status  Not Currently          Drug Use     Drug Use Status  Not Currently          Sexual Activity     Sexually Active  Yes Partners  Male Comment   and monogamous                  Vitals:    12/15/21 0843   BP: 122/72   Pulse: 88         Neurologic Exam    Cognitive and Language: Alert and answered questions appropriately. Language was fluent including repetition and naming. Followed simple and complex commands.     Cranial Nerves: Visual fields were full tested binocularly to finger counting in all 4 quadrants with no visual extinction. Pupils were equal and both reactive to light. Extraocular movements were full with no diplopia or nystagmus. Facial sensation was normal to light touch in V1 to V3. Facial strength was symmetric. Normal hearing grossly bilaterally. Palatal raise was symmetric. Shoulder shrug was symmetric. Tongue protrusion was symmetric with no fasciculations. Motor: Pronator drift was absent. Right Left     Shoulder Abduction:  5 5   Elbow Extension 5 5   Elbow Flexion 5 5   Wrist Extension 5 5   Finger Extension 5 5     Left hand due to finger injury there is weakness of pinky extension and abduction and fourth finger abduction      Right Left   Hip Flexion 5 5   Knee Extension 5 5   Knee Flexion 5 5   Dorsiflexion 5 5   Plantar Flexion 5 5   Full foot inversion and eversion and EHL is full on the left and 4+ on the right  Tremor: absent     Tone: Normal in all four limbs    Reflexes:    Right Left   Brachioradialis 2 2   Biceps 2 2   Triceps 2 2   Patella 2 2   Ankle 0 0            Sensory: Normal pinprick and vibration in the upper extremity. Lower extremity vibration was slightly reduced at both big toes but normal at both medial malleoli. Pinprick sensation was reduced in a patchy pattern around the toes. No change at first webspace. Coordination: Normal finger to nose bilaterally. Negative romberg. Normal gait. Labs:   Lab Results   Component Value Date    WBC 5.0 12/09/2021    HGB 12.7 12/09/2021    HCT 37.2 12/09/2021    MCV 85.7 12/09/2021     12/09/2021     Lab Results   Component Value Date    LABA1C 5.7 07/19/2021           Radiology:    [unfilled]    Most recent    EEG No valid procedures specified. MRI of Brain No results found for this or any previous visit. No results found for this or any previous visit.                             MRA of the Head and Neck: No results found for this or 6/15/2022).             Electronically signed by Tye Ferguson MD on 12/15/2021 at 9:36 AM

## 2022-03-04 ENCOUNTER — OFFICE VISIT (OUTPATIENT)
Dept: PRIMARY CARE CLINIC | Age: 57
End: 2022-03-04
Payer: COMMERCIAL

## 2022-03-04 VITALS
DIASTOLIC BLOOD PRESSURE: 74 MMHG | TEMPERATURE: 98.2 F | BODY MASS INDEX: 27.68 KG/M2 | HEART RATE: 73 BPM | RESPIRATION RATE: 16 BRPM | HEIGHT: 68 IN | WEIGHT: 182.6 LBS | OXYGEN SATURATION: 100 % | SYSTOLIC BLOOD PRESSURE: 108 MMHG

## 2022-03-04 DIAGNOSIS — D22.9 NEVUS: ICD-10-CM

## 2022-03-04 DIAGNOSIS — G62.9 PERIPHERAL POLYNEUROPATHY: ICD-10-CM

## 2022-03-04 DIAGNOSIS — L82.1 SEBORRHEIC KERATOSES: Primary | ICD-10-CM

## 2022-03-04 PROCEDURE — 99214 OFFICE O/P EST MOD 30 MIN: CPT | Performed by: INTERNAL MEDICINE

## 2022-03-04 RX ORDER — IBUPROFEN 800 MG/1
TABLET ORAL
COMMUNITY
Start: 2021-12-13

## 2022-03-04 ASSESSMENT — ENCOUNTER SYMPTOMS
COUGH: 0
VOMITING: 0
SHORTNESS OF BREATH: 0
DIARRHEA: 0
NAUSEA: 0
ABDOMINAL PAIN: 0
WHEEZING: 0

## 2022-03-04 NOTE — PROGRESS NOTES
Subjective:      Patient ID: Nathalie Cornejo. Sury Calzada is a 56 y.o. female    Skin moles   HPI  Pt presents for follow up regarding moles on the abdominal wall and back (many years) and one noticed on the right breast yesterday. NO itch, no change in size or color. No sun exposure. No fam hx skin cancer. Peripheral neuropathy attributed to alcohol abuse (now quit). Much improved on gabapentin and thiamine. Seen by neurology.        Follows with GI for alcoholic liver disease.- stable   Past Medical History:   Diagnosis Date    Allergic rhinitis     Allergies     Asthma     Closed dislocation of finger of left hand 2018    Dermoid tumor     Essential hypertension 5/15/2018    Hyperthyroidism     Thyroid disease      Past Surgical History:   Procedure Laterality Date    LAPAROSCOPY Right 10/20/2021    LAPARSCOPIC ADNEXAL MASS REMOVAL WITH  RIGHT SALPINGO OOPHORECTOMY; INTRAUTERINE DEVICE REMOVAL performed by Marycruz Rojas MD at [address] [place] [address] LASIK Bilateral 2021    MANDIBLE SURGERY       Social History     Socioeconomic History    Marital status:      Spouse name: Bob Moe Number of children: 0    Years of education: 23    Highest education level: Professional school degree (e.g., MD, DDS, DVM, VIVIENNE)   Occupational History    Occupation: retired   Tobacco Use    Smoking status: Former Smoker     Packs/day: 0.25     Years: 5.00     Pack years: 1.25     Types: Cigarettes     Quit date: 1990     Years since quittin.6    Smokeless tobacco: Never Used   Vaping Use    Vaping Use: Never used   Substance and Sexual Activity    Alcohol use: Not Currently    Drug use: Not Currently    Sexual activity: Yes     Partners: Male     Comment:  and monogamous   Other Topics Concern    Not on file   Social History Narrative    Not on file     Social Determinants of Health     Financial Resource Strain: Low Risk     Difficulty of Paying Living Expenses: Not very hard   Food Insecurity: No Food Insecurity    Worried About Running Out of Food in the Last Year: Never true    [place] of Food in the Last Year: Never true   Transportation Needs:     Lack of Transportation (Medical): Not on file    Lack of Transportation (Non-Medical):  Not on file   Physical Activity:     Days of Exercise per Week: Not on file    Minutes of Exercise per Session: Not on file   Stress:     Feeling of Stress : Not on file   Social Connections:     Frequency of Communication with Friends and Family: Not on file    Frequency of Social Gatherings with Friends and Family: Not on file    Attends Jew Services: Not on file    Active Member of Clubs or Organizations: Not on file    Attends Club or Organization Meetings: Not on file    Marital Status: Not on file   Intimate Partner Violence:     Fear of Current or Ex-Partner: Not on file    Emotionally Abused: Not on file    Physically Abused: Not on file    Sexually Abused: Not on file   Housing Stability:     Unable to Pay for Housing in the Last Year: Not on file    Number of Places Lived in the Last Year: Not on file    Unstable Housing in the Last Year: Not on file     Family History   Problem Relation Age of Onset    Anemia Mother     Asthma Mother     Obesity Mother     Alcohol Abuse Neg Hx     Allergy (Severe) Neg Hx     Arthritis Neg Hx     Arrhythmia Neg Hx     Atrial Fibrillation Neg Hx     Birth Defects Neg Hx     Breast Cancer Neg Hx     Cancer Neg Hx     Coronary Art Dis Neg Hx     Colon Cancer Neg Hx     Depression Neg Hx     Diabetes Neg Hx     Early Death Neg Hx     Hearing Loss Neg Hx     Heart Attack Neg Hx     Heart Disease Neg Hx     High Blood Pressure Neg Hx     High Cholesterol Neg Hx     Kidney Disease Neg Hx     Learning Disabilities Neg Hx     Mental Illness Neg Hx     Mental Retardation Neg Hx     Miscarriages / Stillbirths Neg Hx     Osteoporosis Neg Hx     Prostate Cancer Neg Hx     Stroke Neg Hx     Substance Abuse Neg Hx     Vision Loss Neg Hx      Allergies:  Patient has no known allergies. Patient Active Problem List   Diagnosis    Acquired hypothyroidism    Allergic conjunctivitis of both eyes    Closed dislocation of finger of left hand    Diverticulosis of sigmoid colon    Essential hypertension    Mild persistent asthma without complication    Myopia of both eyes    Seasonal and perennial allergic rhinitis    Stiffness of finger joint, left    Pelvic mass    Dermoid cyst    Peripheral neuropathy     Current Outpatient Medications on File Prior to Visit   Medication Sig Dispense Refill    thiamine 100 MG tablet Take 1 tablet by mouth daily 90 tablet 3    triazolam (HALCION) 0.25 MG tablet       gabapentin (NEURONTIN) 100 MG capsule Take 2 capsules by mouth 3 times daily for 90 days. Intended supply: 90 days 360 capsule 2    clotrimazole-betamethasone (LOTRISONE) 1-0.05 % cream Apply topically 2 times daily. 1 each 0    levothyroxine (SYNTHROID) 75 MCG tablet Take 1 tablet by mouth Daily 90 tablet 3    hydroCHLOROthiazide (HYDRODIURIL) 25 MG tablet Take 1 tablet by mouth every morning 90 tablet 3    magnesium oxide (MAG-OX) 400 MG tablet TAKE 1 TABLET BY MOUTH EVERY DAY 90 tablet 3    potassium chloride (KLOR-CON M) 20 MEQ extended release tablet 1 pill twice a week. 30 tablet 2    mometasone (ELOCON) 0.1 % cream Apply topically daily. 1 each 2    conjugated estrogens (PREMARIN) 0.625 MG/GM vaginal cream Use 0.5 g pv 2 to 3 times weekly. 3 each 3    montelukast (SINGULAIR) 10 MG tablet Take 1 tablet by mouth nightly 90 tablet 3    folic acid (FOLVITE) 1 MG tablet Take 1 tablet by mouth daily 90 tablet 3    desloratadine (CLARINEX) 5 MG tablet Take 1 tablet by mouth daily 90 tablet 3    WIXELA INHUB 100-50 MCG/DOSE diskus inhaler INHALE 1 PUFF AS INSTRUCTED TWICE DAILY.  RINSE AND GARGLE MOUTH WITH WATER AFTER EACH USE 60 each 5    albuterol (PROVENTIL) (2.5 MG/3ML) 0.083% nebulizer solution Inhale by nebulizer over 5-15 minutes three (3) to four (4) times a day as needed for cough/wheezing/shortness of breath 120 each 5    albuterol sulfate  (90 Base) MCG/ACT inhaler Inhale 2 puffs into the lungs every 4 hours as needed for Wheezing or Shortness of Breath 1 Inhaler 1    ibuprofen (ADVIL;MOTRIN) 800 MG tablet        No current facility-administered medications on file prior to visit. Review of Systems   Respiratory: Negative for cough, shortness of breath and wheezing. Cardiovascular: Negative for chest pain. Gastrointestinal: Negative for abdominal pain, diarrhea, nausea and vomiting. Endocrine: Negative for cold intolerance and heat intolerance. Genitourinary: Negative for dysuria and frequency. Skin: Positive for rash. Neurological: Positive for numbness. Negative for dizziness, weakness and light-headedness. Psychiatric/Behavioral: Negative for dysphoric mood. The patient is not nervous/anxious. Objective:   /74 (Site: Right Upper Arm, Position: Sitting, Cuff Size: Large Adult)   Pulse 73   Temp 98.2 °F (36.8 °C)   Resp 16   Ht 5' 8\" (1.727 m)   Wt 182 lb 9.6 oz (82.8 kg)   LMP  (LMP Unknown)   SpO2 100%   BMI 27.76 kg/m²     Physical Exam  Constitutional:       General: She is not in acute distress. Appearance: She is not diaphoretic. Cardiovascular:      Rate and Rhythm: Normal rate and regular rhythm. Pulses: Normal pulses. Heart sounds: Normal heart sounds, S1 normal and S2 normal.   Pulmonary:      Effort: Pulmonary effort is normal. No respiratory distress. Breath sounds: Normal breath sounds. No wheezing or rales. Chest:      Chest wall: No tenderness. Abdominal:      General: Bowel sounds are normal.      Tenderness: There is no abdominal tenderness. Skin:     Comments: Left upper abd wall nonuniformly hyperpigmented patches(onw with uneven edges).    Right breast and mid back hyperpigmented stuck-on verrucous lesion      Neurological:      General: No focal deficit present. Mental Status: She is alert. Cranial Nerves: No cranial nerve deficit. Assessment:       Diagnosis Orders   1. Seborrheic keratoses  IFEOMA Erickson DO, [facility]atology[facility]vinash   2. Nevus  IFEOMA Erickson DO, Avinash Valdez   3. Peripheral polyneuropathy Controlled        Plan:      Orders Placed This Encounter   Procedures    IFEOMA Erickson DO, DermatologyAvinash     Referral Priority:   Routine     Referral Type:   Eval and Treat     Referral Reason:   Specialty Services Required     Referred to Provider:   Deepa Pandya DO     Requested Specialty:   Dermatology     Number of Visits Requested:   1     No orders of the defined types were placed in this encounter. Return in about 6 months (around 9/4/2022) for follow up, with PCP.

## 2022-03-24 DIAGNOSIS — F41.9 ANXIETY: Primary | ICD-10-CM

## 2022-03-28 ENCOUNTER — TELEPHONE (OUTPATIENT)
Dept: PRIMARY CARE CLINIC | Age: 57
End: 2022-03-28

## 2022-03-28 NOTE — TELEPHONE ENCOUNTER
----- Message from [facility] sent at 3/28/2022 10:12 AM EDT -----  Subject: Message to Provider    QUESTIONS  Information for Provider? patient checked with insurance for referrals--   names given were [facility] [address] [facility]   ---------------------------------------------------------------------------  --------------  [address]  What is the best way for the office to contact you? OK to leave message on   voicemail  Preferred Call Back Phone Number? [phone]  ---------------------------------------------------------------------------  --------------  SCRIPT ANSWERS  Relationship to Patient?  Self

## 2022-03-28 NOTE — TELEPHONE ENCOUNTER
----- Message from Nancy Bo sent at 3/28/2022  9:48 AM EDT -----  Subject: Referral Request    QUESTIONS   Reason for referral request? Patient called to advise that [facility]   Dermatology no longer accepts COURTNEY Grigsby. Please review to   add another referral for a different dermatology   Has the physician seen you for this condition before? No   Preferred Specialist (if applicable)? Do you already have an appointment scheduled? No  Additional Information for Provider? patient will check with [employer] for   in network Dermatologist  ---------------------------------------------------------------------------  --------------  [id number]  What is the best way for the office to contact you? OK to leave message on   voicemail  Preferred Call Back Phone Number?  [phone] No...

## 2022-03-29 DIAGNOSIS — R20.2 NUMBNESS AND TINGLING OF BOTH FEET: ICD-10-CM

## 2022-03-29 DIAGNOSIS — J45.30 MILD PERSISTENT ASTHMA WITHOUT COMPLICATION: ICD-10-CM

## 2022-03-29 DIAGNOSIS — L82.1 SEBORRHEIC KERATOSES: Primary | ICD-10-CM

## 2022-03-29 DIAGNOSIS — R20.0 NUMBNESS AND TINGLING OF BOTH FEET: ICD-10-CM

## 2022-03-29 RX ORDER — GABAPENTIN 100 MG/1
200 CAPSULE ORAL 3 TIMES DAILY
Qty: 360 CAPSULE | Refills: 2 | Status: SHIPPED | OUTPATIENT
Start: 2022-03-29 | End: 2022-04-01 | Stop reason: SDUPTHER

## 2022-03-29 RX ORDER — MONTELUKAST SODIUM 10 MG/1
10 TABLET ORAL NIGHTLY
Qty: 90 TABLET | Refills: 3 | Status: SHIPPED | OUTPATIENT
Start: 2022-03-29 | End: 2022-09-17 | Stop reason: SDUPTHER

## 2022-04-01 DIAGNOSIS — R20.2 NUMBNESS AND TINGLING OF BOTH FEET: ICD-10-CM

## 2022-04-01 DIAGNOSIS — R20.0 NUMBNESS AND TINGLING OF BOTH FEET: ICD-10-CM

## 2022-04-01 RX ORDER — GABAPENTIN 100 MG/1
200 CAPSULE ORAL 3 TIMES DAILY
Qty: 540 CAPSULE | Refills: 3 | Status: SHIPPED | OUTPATIENT
Start: 2022-04-01 | End: 2022-07-08

## 2022-04-01 NOTE — TELEPHONE ENCOUNTER
Patient called in stating the pharmacy says her rx for the gabapentin is only for 60 days but it says 90 days she says the pharmacy needs a phone call to clarify the rx.

## 2022-05-02 ENCOUNTER — OFFICE VISIT (OUTPATIENT)
Dept: OBGYN CLINIC | Age: 57
End: 2022-05-02
Payer: COMMERCIAL

## 2022-05-02 VITALS
HEIGHT: 68 IN | WEIGHT: 172 LBS | SYSTOLIC BLOOD PRESSURE: 110 MMHG | BODY MASS INDEX: 26.07 KG/M2 | DIASTOLIC BLOOD PRESSURE: 64 MMHG

## 2022-05-02 DIAGNOSIS — N89.8 VAGINAL MASS: Primary | ICD-10-CM

## 2022-05-02 PROCEDURE — 99212 OFFICE O/P EST SF 10 MIN: CPT | Performed by: OBSTETRICS & GYNECOLOGY

## 2022-05-02 ASSESSMENT — ENCOUNTER SYMPTOMS
NAUSEA: 0
RESPIRATORY NEGATIVE: 1
RECTAL PAIN: 0
BLOOD IN STOOL: 0
EYES NEGATIVE: 1
DIARRHEA: 0
VOMITING: 0
ANAL BLEEDING: 0
ALLERGIC/IMMUNOLOGIC NEGATIVE: 1
ABDOMINAL DISTENTION: 0
CONSTIPATION: 0
ABDOMINAL PAIN: 0

## 2022-05-02 NOTE — PROGRESS NOTES
Patient here with concerns for cervical / vaginal mass. Reviewed medical, surgical, social and family history. Also reviewed current medications and allergies. Patient states she was having SA with  and he thought he felt something in vagina. Manual exam per  and he told patient he felt a soft mass. No PMB. No pain. Exam with normal vagina and normal appearing cervix without mass or concern. Suspect  was palpating cervix alone. Discussed findings and patient reassured. All questions answered. Vitals:  /64   Ht 5' 8\" (1.727 m)   Wt 172 lb (78 kg)   LMP  (LMP Unknown)   BMI 26.15 kg/m²   Past Medical History:   Diagnosis Date    Allergic rhinitis     Allergies     Asthma     Closed dislocation of finger of left hand 5/4/2018    Dermoid tumor     Essential hypertension 5/15/2018    Hyperthyroidism     Thyroid disease      Past Surgical History:   Procedure Laterality Date    LAPAROSCOPY Right 10/20/2021    LAPARSCOPIC ADNEXAL MASS REMOVAL WITH  RIGHT SALPINGO OOPHORECTOMY; INTRAUTERINE DEVICE REMOVAL performed by Lewis Briggs MD at [address] [place] [address] Bilateral 05/2021    MANDIBLE SURGERY       Allergies:  Patient has no known allergies. Current Outpatient Medications   Medication Sig Dispense Refill    gabapentin (NEURONTIN) 100 MG capsule Take 2 capsules by mouth 3 times daily for 90 days. Intended supply: 90 days 540 capsule 3    montelukast (SINGULAIR) 10 MG tablet Take 1 tablet by mouth nightly 90 tablet 3    ibuprofen (ADVIL;MOTRIN) 800 MG tablet       thiamine 100 MG tablet Take 1 tablet by mouth daily 90 tablet 3    clotrimazole-betamethasone (LOTRISONE) 1-0.05 % cream Apply topically 2 times daily.  1 each 0    levothyroxine (SYNTHROID) 75 MCG tablet Take 1 tablet by mouth Daily 90 tablet 3    hydroCHLOROthiazide (HYDRODIURIL) 25 MG tablet Take 1 tablet by mouth every morning 90 tablet 3    magnesium oxide (MAG-OX) 400 MG tablet TAKE 1 TABLET BY MOUTH EVERY DAY 90 tablet 3    potassium chloride (KLOR-CON M) 20 MEQ extended release tablet 1 pill twice a week. 30 tablet 2    mometasone (ELOCON) 0.1 % cream Apply topically daily. 1 each 2    conjugated estrogens (PREMARIN) 0.625 MG/GM vaginal cream Use 0.5 g pv 2 to 3 times weekly. 3 each 3    folic acid (FOLVITE) 1 MG tablet Take 1 tablet by mouth daily 90 tablet 3    desloratadine (CLARINEX) 5 MG tablet Take 1 tablet by mouth daily 90 tablet 3    WIXELA INHUB 100-50 MCG/DOSE diskus inhaler INHALE 1 PUFF AS INSTRUCTED TWICE DAILY. RINSE AND GARGLE MOUTH WITH WATER AFTER EACH USE 60 each 5    albuterol (PROVENTIL) (2.5 MG/3ML) 0.083% nebulizer solution Inhale by nebulizer over 5-15 minutes three (3) to four (4) times a day as needed for cough/wheezing/shortness of breath 120 each 5    albuterol sulfate  (90 Base) MCG/ACT inhaler Inhale 2 puffs into the lungs every 4 hours as needed for Wheezing or Shortness of Breath 1 Inhaler 1    triazolam (HALCION) 0.25 MG tablet  (Patient not taking: Reported on 2022)       No current facility-administered medications for this visit.      Social History     Socioeconomic History    Marital status:      Spouse name: Floridalma Murguia Number of children: 0    Years of education: 23    Highest education level: Professional school degree (e.g., MD, DDS, DVM, VIVIENNE)   Occupational History    Occupation: retired   Tobacco Use    Smoking status: Former Smoker     Packs/day: 0.25     Years: 5.00     Pack years: 1.25     Types: Cigarettes     Quit date: 1990     Years since quittin.8    Smokeless tobacco: Never Used   Vaping Use    Vaping Use: Never used   Substance and Sexual Activity    Alcohol use: Not Currently    Drug use: Not Currently    Sexual activity: Yes     Partners: Male     Comment:  and monogamous   Other Topics Concern    Not on file   Social History Narrative    Not on file     Social Determinants of Health     Financial Resource Strain: Low Risk     Difficulty of Paying Living Expenses: Not very hard   Food Insecurity: No Food Insecurity    Worried About Running Out of Food in the Last Year: Never true    [place] of Food in the Last Year: Never true   Transportation Needs:     Lack of Transportation (Medical): Not on file    Lack of Transportation (Non-Medical):  Not on file   Physical Activity:     Days of Exercise per Week: Not on file    Minutes of Exercise per Session: Not on file   Stress:     Feeling of Stress : Not on file   Social Connections:     Frequency of Communication with Friends and Family: Not on file    Frequency of Social Gatherings with Friends and Family: Not on file    Attends Voodoo Services: Not on file    Active Member of Clubs or Organizations: Not on file    Attends Club or Organization Meetings: Not on file    Marital Status: Not on file   Intimate Partner Violence:     Fear of Current or Ex-Partner: Not on file    Emotionally Abused: Not on file    Physically Abused: Not on file    Sexually Abused: Not on file   Housing Stability:     Unable to Pay for Housing in the Last Year: Not on file    Number of Places Lived in the Last Year: Not on file    Unstable Housing in the Last Year: Not on file        Family History   Problem Relation Age of Onset    Anemia Mother     Asthma Mother     Obesity Mother     Alcohol Abuse Neg Hx     Allergy (Severe) Neg Hx     Arthritis Neg Hx     Arrhythmia Neg Hx     Atrial Fibrillation Neg Hx     Birth Defects Neg Hx     Breast Cancer Neg Hx     Cancer Neg Hx     Coronary Art Dis Neg Hx     Colon Cancer Neg Hx     Depression Neg Hx     Diabetes Neg Hx     Early Death Neg Hx     Hearing Loss Neg Hx     Heart Attack Neg Hx     Heart Disease Neg Hx     High Blood Pressure Neg Hx     High Cholesterol Neg Hx     Kidney Disease Neg Hx     Learning Disabilities Neg Hx     Mental Illness Neg Hx     Mental Retardation Neg Hx     Miscarriages / Stillbirths Neg Hx     Osteoporosis Neg Hx     Prostate Cancer Neg Hx     Stroke Neg Hx     Substance Abuse Neg Hx     Vision Loss Neg Hx        Review of Systems   Constitutional: Negative. Negative for activity change, appetite change, chills, diaphoresis, fatigue, fever and unexpected weight change. HENT: Negative. Eyes: Negative. Respiratory: Negative. Cardiovascular: Negative. Gastrointestinal: Negative for abdominal distention, abdominal pain, anal bleeding, blood in stool, constipation, diarrhea, nausea, rectal pain and vomiting. Endocrine: Negative. Genitourinary: Negative for decreased urine volume, difficulty urinating, dyspareunia, dysuria, enuresis, flank pain, frequency, genital sores, hematuria, menstrual problem, pelvic pain, urgency, vaginal bleeding, vaginal discharge and vaginal pain. Musculoskeletal: Negative. Skin: Negative. Allergic/Immunologic: Negative. Neurological: Negative. Hematological: Negative. Psychiatric/Behavioral: Negative. Objective:     Physical Exam  Constitutional:       General: She is not in acute distress. Appearance: She is well-developed. She is not diaphoretic. HENT:      Head: Normocephalic and atraumatic. Eyes:      Conjunctiva/sclera: Conjunctivae normal.   Cardiovascular:      Rate and Rhythm: Normal rate and regular rhythm. Pulmonary:      Effort: Pulmonary effort is normal. No respiratory distress. Genitourinary:     Labia:         Right: No rash, tenderness, lesion or injury. Left: No rash, tenderness, lesion or injury. Vagina: Normal. No signs of injury and foreign body. No vaginal discharge, erythema, tenderness or bleeding. Comments: No abnormal discharge. No cervical or vaginal lesions. Normal external genitalia. Musculoskeletal:         General: No tenderness or deformity. Normal range of motion. Cervical back: Normal range of motion and neck supple.    Skin: General: Skin is warm and dry. Coloration: Skin is not pale. Neurological:      Mental Status: She is alert and oriented to person, place, and time. Motor: No abnormal muscle tone. Coordination: Coordination normal.   Psychiatric:         Behavior: Behavior normal.         Thought Content: Thought content normal.         Judgment: Judgment normal.         Assessment:          Diagnosis Orders   1. Vaginal mass          Plan:      Medications placedthis encounter:  No orders of the defined types were placed in this encounter. Orders placedthis encounter:  No orders of the defined types were placed in this encounter. Follow up:  Return for Annual.            The patient was asked if she would like a chaperone present for her intimate exam. She  Declined the chaperone.  Josh Jeffries MD

## 2022-05-19 DIAGNOSIS — R21 RASH: ICD-10-CM

## 2022-05-20 RX ORDER — MOMETASONE FUROATE 1 MG/G
CREAM TOPICAL
Qty: 15 G | Refills: 2 | Status: SHIPPED | OUTPATIENT
Start: 2022-05-20

## 2022-05-25 DIAGNOSIS — J30.2 SEASONAL ALLERGIES: ICD-10-CM

## 2022-05-26 RX ORDER — DESLORATADINE 5 MG/1
TABLET ORAL
Qty: 90 TABLET | Refills: 3 | Status: SHIPPED | OUTPATIENT
Start: 2022-05-26

## 2022-05-30 DIAGNOSIS — Z00.00 PREVENTATIVE HEALTH CARE: ICD-10-CM

## 2022-06-01 RX ORDER — FOLIC ACID 1 MG/1
TABLET ORAL
Qty: 90 TABLET | Refills: 3 | Status: SHIPPED | OUTPATIENT
Start: 2022-06-01

## 2022-06-18 DIAGNOSIS — E87.6 HYPOKALEMIA: ICD-10-CM

## 2022-06-19 RX ORDER — POTASSIUM CHLORIDE 20 MEQ/1
TABLET, EXTENDED RELEASE ORAL
Qty: 30 TABLET | Refills: 2 | Status: SHIPPED | OUTPATIENT
Start: 2022-06-19

## 2022-06-22 ENCOUNTER — NURSE TRIAGE (OUTPATIENT)
Dept: OTHER | Facility: CLINIC | Age: 57
End: 2022-06-22

## 2022-06-22 ENCOUNTER — OFFICE VISIT (OUTPATIENT)
Dept: PRIMARY CARE CLINIC | Age: 57
End: 2022-06-22

## 2022-06-22 VITALS
HEIGHT: 68 IN | DIASTOLIC BLOOD PRESSURE: 70 MMHG | HEART RATE: 76 BPM | SYSTOLIC BLOOD PRESSURE: 104 MMHG | TEMPERATURE: 97.6 F | OXYGEN SATURATION: 99 % | WEIGHT: 168.4 LBS | BODY MASS INDEX: 25.52 KG/M2
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
## 2022-06-22 DIAGNOSIS — M79.645 THUMB PAIN, LEFT: ICD-10-CM

## 2022-06-22 PROCEDURE — 73140 X-RAY EXAM OF FINGER(S): CPT | Performed by: PHYSICIAN ASSISTANT

## 2022-06-22 PROCEDURE — 99213 OFFICE O/P EST LOW 20 MIN: CPT | Performed by: PHYSICIAN ASSISTANT

## 2022-06-22 PROCEDURE — L3908 WHO COCK-UP NONMOLDE PRE OTS: HCPCS | Performed by: PHYSICIAN ASSISTANT

## 2022-06-22 ASSESSMENT — PATIENT HEALTH QUESTIONNAIRE - PHQ9
SUM OF ALL RESPONSES TO PHQ9 QUESTIONS 1 & 2: 0
SUM OF ALL RESPONSES TO PHQ QUESTIONS 1-9: 0
8. MOVING OR SPEAKING SO SLOWLY THAT OTHER PEOPLE COULD HAVE NOTICED. OR THE OPPOSITE, BEING SO FIGETY OR RESTLESS THAT YOU HAVE BEEN MOVING AROUND A LOT MORE THAN USUAL: 0
2. FEELING DOWN, DEPRESSED OR HOPELESS: 0
5. POOR APPETITE OR OVEREATING: 0
SUM OF ALL RESPONSES TO PHQ QUESTIONS 1-9: 0
9. THOUGHTS THAT YOU WOULD BE BETTER OFF DEAD, OR OF HURTING YOURSELF: 0
6. FEELING BAD ABOUT YOURSELF - OR THAT YOU ARE A FAILURE OR HAVE LET YOURSELF OR YOUR FAMILY DOWN: 0
SUM OF ALL RESPONSES TO PHQ QUESTIONS 1-9: 0
3. TROUBLE FALLING OR STAYING ASLEEP: 0
7. TROUBLE CONCENTRATING ON THINGS, SUCH AS READING THE NEWSPAPER OR WATCHING TELEVISION: 0
10. IF YOU CHECKED OFF ANY PROBLEMS, HOW DIFFICULT HAVE THESE PROBLEMS MADE IT FOR YOU TO DO YOUR WORK, TAKE CARE OF THINGS AT HOME, OR GET ALONG WITH OTHER PEOPLE: 0
4. FEELING TIRED OR HAVING LITTLE ENERGY: 0
SUM OF ALL RESPONSES TO PHQ QUESTIONS 1-9: 0
1. LITTLE INTEREST OR PLEASURE IN DOING THINGS: 0
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
## 2022-07-03 DIAGNOSIS — M25.642 STIFFNESS OF FINGER JOINT, LEFT: Primary | ICD-10-CM

## 2022-07-03 ASSESSMENT — ENCOUNTER SYMPTOMS
VOMITING: 0
EYE PAIN: 0
EYE DISCHARGE: 0
EYE REDNESS: 0
SHORTNESS OF BREATH: 0
BLOOD IN STOOL: 0
NAUSEA: 0
PHOTOPHOBIA: 0
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
## 2022-07-08 ENCOUNTER — OFFICE VISIT (OUTPATIENT)
Dept: NEUROLOGY | Age: 57
End: 2022-07-08
Payer: COMMERCIAL

## 2022-07-08 VITALS
SYSTOLIC BLOOD PRESSURE: 114 MMHG | DIASTOLIC BLOOD PRESSURE: 68 MMHG | BODY MASS INDEX: 25.45 KG/M2 | HEART RATE: 65 BPM | WEIGHT: 167.4 LBS

## 2022-07-08 DIAGNOSIS — G63 POLYNEUROPATHY ASSOCIATED WITH UNDERLYING DISEASE (HCC): ICD-10-CM

## 2022-07-08 DIAGNOSIS — G62.9 SMALL FIBER NEUROPATHY: Primary | ICD-10-CM

## 2022-07-08 DIAGNOSIS — M79.2 NEUROPATHIC PAIN: ICD-10-CM

## 2022-07-08 DIAGNOSIS — R20.2 DISTAL PARESTHESIA: ICD-10-CM

## 2022-07-08 PROCEDURE — 99215 OFFICE O/P EST HI 40 MIN: CPT | Performed by: PSYCHIATRY & NEUROLOGY

## 2022-07-08 ASSESSMENT — ENCOUNTER SYMPTOMS
NAUSEA: 0
COLOR CHANGE: 0
SHORTNESS OF BREATH: 0
BACK PAIN: 0
PHOTOPHOBIA: 0
TROUBLE SWALLOWING: 0
VOMITING: 0
CHOKING: 0
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
## 2022-07-12 LAB — MAG (MYELIN ASSOC. GLYCOPROTEIN) AB IGM: <1000 TU (ref 0–999)

## 2022-07-12 RX ORDER — LANOLIN ALCOHOL/MO/W.PET/CERES
100 CREAM (GRAM) TOPICAL DAILY
Qty: 90 TABLET | Refills: 1 | Status: SHIPPED | OUTPATIENT
Start: 2022-07-12 | End: 2023-01-08

## 2022-07-13 LAB
EER IMMUNOFIX ELECTROPHORESIS GEL: NORMAL
IMMUNOFIX ELECTROPHORESIS GEL: NORMAL

## 2022-07-15 ENCOUNTER — HOSPITAL ENCOUNTER (OUTPATIENT)
Dept: WOMENS IMAGING | Age: 57
Discharge: HOME | End: 2022-07-17
Payer: COMMERCIAL

## 2022-07-15 DIAGNOSIS — Z12.31 SCREENING MAMMOGRAM, ENCOUNTER FOR: ICD-10-CM

## 2022-07-15 PROCEDURE — 77067 SCR MAMMO BI INCL CAD: CPT

## 2022-07-27 ENCOUNTER — HOSPITAL ENCOUNTER (OUTPATIENT)
Dept: OCCUPATIONAL THERAPY | Age: 57
Setting detail: THERAPIES SERIES
Discharge: HOME | End: 2022-07-27
Payer: COMMERCIAL

## 2022-07-27 PROCEDURE — 97166 OT EVAL MOD COMPLEX 45 MIN: CPT
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
## 2022-08-02 ENCOUNTER — HOSPITAL ENCOUNTER (OUTPATIENT)
Dept: OCCUPATIONAL THERAPY | Age: 57
Setting detail: THERAPIES SERIES
Discharge: HOME | End: 2022-08-02
Payer: COMMERCIAL

## 2022-08-02 ENCOUNTER — APPOINTMENT (OUTPATIENT)
Dept: OCCUPATIONAL THERAPY | Age: 57
End: 2022-08-02
Payer: COMMERCIAL

## 2022-08-03 NOTE — PROGRESS NOTES
Therapy                            Cancellation/No-show Note    Date: 2022  Patient Name: Katy Duarte    : 1965  (56 y.o.)     MRN: [id number]    Account #: [de-identified]       Canceled Appointment: 1    Comments: For today's appointment patient:  [x]  Cancelled  []  Rescheduled appointment  []  No-show   []  Called pt to remind of next appointment     Reason given by patient:  []  Patient ill  []  Conflicting appointment  []  No transportation    []  Conflict with work  []  No reason given  [x]  Other:  unable to attend    [x] Pt has future appointments scheduled, no follow up needed  [] Pt requests to be on hold. Reason:   If > 2 weeks please discuss with therapist.  [] Therapist to call pt for follow up     Signature:  NARAYAN Hays 8/3/2022 7:51 AM

## 2022-08-05 DIAGNOSIS — G62.9 SMALL FIBER NEUROPATHY: ICD-10-CM

## 2022-08-08 LAB
HOURS COLLECTED: 24 HR
IFE INTERPRETATION:U: NORMAL
KAPPA QUANT FREE LIGHT CHAINS: 3.2 MG/L (ref 0–32.9)
LAMBDA FREE LIGHT CHAINS URINE/ VOL: 0.97 MG/L (ref 0–3.79)
PROTEIN PROTEIN: NORMAL MG/D
URINE FREE KAPPA EXCRETION/DAY: 10.24 MG/D
URINE FREE LAMBDA EXCRETION/DAY: 3.1 MG/D
URINE TOTAL VOLUME: 3200 ML

## 2022-08-10 ENCOUNTER — TELEPHONE (OUTPATIENT)
Dept: NEUROLOGY | Age: 57
End: 2022-08-10

## 2022-08-10 ENCOUNTER — HOSPITAL ENCOUNTER (OUTPATIENT)
Dept: OCCUPATIONAL THERAPY | Age: 57
Setting detail: THERAPIES SERIES
Discharge: HOME | End: 2022-08-10
Payer: COMMERCIAL

## 2022-08-10 NOTE — PROGRESS NOTES
Therapy                            Cancellation/No-show Note    Date: 8/10/2022  Patient Name: Estelita Duarte    : 1965  (56 y.o.)     MRN: [id number]    Account #: [de-identified]       Canceled Appointment: 2    Comments: For today's appointment patient:  [x]  Cancelled  []  Rescheduled appointment  []  No-show   []  Called pt to remind of next appointment     Reason given by patient:  []  Patient ill  []  Conflicting appointment  []  No transportation    []  Conflict with work  []  No reason given  [x]  Other:  conflict with home schedule    [x] Pt has future appointments scheduled, no follow up needed  [] Pt requests to be on hold. Reason:   If > 2 weeks please discuss with therapist.  [] Therapist to call pt for follow up     Signature:  BOO Ashley/L 8/10/2022 5:49 PM

## 2022-08-17 ENCOUNTER — HOSPITAL ENCOUNTER (OUTPATIENT)
Dept: OCCUPATIONAL THERAPY | Age: 57
Setting detail: THERAPIES SERIES
Discharge: HOME | End: 2022-08-17
Payer: COMMERCIAL

## 2022-08-17 PROCEDURE — 97530 THERAPEUTIC ACTIVITIES: CPT

## 2022-08-17 PROCEDURE — 97140 MANUAL THERAPY 1/> REGIONS: CPT

## 2022-08-17 NOTE — PROGRESS NOTES
[facility]     Occupational Therapy: Daily Note   Patient: Katy Duarte [de-identified]56 y.o. female)   Date:   Plan of Care Certification Period:  22   :  1965  MRN: [id number]  CSN: [id number]   Insurance: Payor: Romana Antoine / Plan: Nancy Montano [place] / Product Type: *No Product type* /   Insurance ID: [id number] - (Commercial) Secondary Insurance (if applicable):    Referring Physician: Felipe Guerrero     PCP: Joseph Taylor MD Visits to Date: Total # of Visits to Date: 1   Progress note:Progress Note Counter: 2  Visits Approved: 8    No Show:    Cancelled Appts:2     Medical Diagnosis: Stiffness of left hand, not elsewhere classified [M25.642] L thumb stiffness        Therapy Time    Time in 0801   Time out 0900   Total treatment minutes 59   Total time code minutes  59 Minutes        OT Manual therapy 11 minutes for 1 unit(s), CPT 42995  OT Therapeutic activities 48 minutes for 3 unit(s), CPT 98372       SUBJECTIVE EXAMINATION   Pt. has not attended therapy since eval due to pt.'s schedule. Patient's date of birth confirmed: Yes     Pain Level:   3/10    Patient Comments: \"My farthest joint of my thumb is the one that gets pain. But I can tell the swelling has gone down. \"    Learning/Language barrier: no     HEP/Strategies/Orthosis Compliance: N/A     Restrictions:   None     OBJECTIVE EXAMINATION   Circumference:  Between MP and IP of L thumb: 7 cm        IP joint:  6.4 cm      TREATMENT     Focus of treatment was on the following:   Improve AROM of IP of L thumb and decr edema. Re-measured as above. MFR/Manual:   Pt treated seated on chair for cross friction mobilization and retrograde massage from L thumb pad to [facility] joint. Moderate traction thumb pull tolerated to increase joint space and alleviate restrictions. \"Popping\" noted at IP joint with pt. reporting pain relief immediately following.     Fluidotherapy at 115° F for 20 minutes while completing AROM of L thumb all motions. Pt. performed activities to encourage thumb flexion and grasp. Pt. instructed in and performed thumb digit blocking AAROM HEP.   8 reps each joint. Utilizing 10 lb. theraband flex bar, pt. grasped and flexed bar 2 x 10 rep and grasped/twisted bar x 10 reps. Pt. also performed grasp and turn w/ LUE using variable resistance handle x 10. Thumb strengthening with yellow power web for opposition and thumb flexion, 10 reps each. Provided pt. with information to purchase thumb support for biking activities. Patient Education/HEP:    Digit blocking HEP, Pt completed 8 reps to demo understanding. Pt with good follow through. ASSESSMENT       Assessment: Pt reported no increase in pain after OT treatment. Pt.'s edema reduced since eval date, especially at IP jt. of L thumb. Pt. reluctant to ride her bike due to injury however, willing to try with recommended brace. No pain at MP joint this date. Pt. demo good understanding of HEP. Post Treatment Pain: 3/10    Patient's Activity Tolerance:    GOOD            GOALS         Long Term Goals  Time Frame for Long term goals : 2x week x 4-5 weeks  Long Term Goal 1: Patient will report pain 2 or less during functional activities  Long Term Goal 2: Patient  will be indep with all recommended HEP's, adaptive strategies, and adaptive techniques. Long Term Goal 3: Patient will increase AROM of L thumb from current by 10-13° to increase performance with I/ADL's. Long Term Goal 4: Patient  will increase L  strength from current by 10-15 lbs to increase performance with I/ADL's. Long Term Goal 5: Patient  will increase L pinch strength from current by 3-5 lbs to increase performance with I/ADL's.   Long Term Goal 6: Patient  will increase dexterity in L hand as observed by 9 hole peg test by decreasing time from current  by 1-2 seconds to increase performance with I/ADL's  Long Term Goal 7: Pt. will be able to utilize  handle during bike riding without pain. TREATMENT PLAN   Continue 1-2x week, upgrade HEP as tolerated and improve ROM L thumb IP jt.          Electronically signed by NARAYAN Kauffman  on 8/17/2022 at 11:20 AM

## 2022-08-23 ENCOUNTER — HOSPITAL ENCOUNTER (OUTPATIENT)
Dept: OCCUPATIONAL THERAPY | Age: 57
Setting detail: THERAPIES SERIES
Discharge: HOME | End: 2022-08-23
Payer: COMMERCIAL

## 2022-08-23 PROCEDURE — 97530 THERAPEUTIC ACTIVITIES: CPT

## 2022-08-23 NOTE — PROGRESS NOTES
OCCUPATIONAL THERAPY PLAN OF CARE     [facility]   [address], [address]  Phone: [phone]    [] Certification     [] Recertification     [] Plan of Care    [x] Progress Note        Date: 2022    To:Referring Practitioner: Laura Cordova PA-C          From: Beronica Grace, OTR/L  Patient: Thi Duarte       : 1965  MRN: [id number]  Diagnosis:Diagnosis: L thumb stiffness   Date of eval:     Visit Information:   Onset Date: 22  OT Insurance Information: Pascual Pillai  Total # of Visits Approved: 8  Total # of Visits to Date: 3  Certification Period Expiration Date: 22  Progress Note Due Date: 22  Canceled Appointment: 2  Progress Note Counter: 3    Last POC date: 2022   Reporting period: -2022                             Assessment:    Goals Current/Discharge status  Met   Long Term Goal 1: Patient will report pain 2 or less during functional activities Pt. experiences pain in IP jt of thumb occasionally 2-4/10 pain w/ activities. Pain decreasing from initial eval. [] Met  [x] Partially Met  [] Not Met   Long Term Goal 2: Patient  will be indep with all recommended HEP's, adaptive strategies, and adaptive techniques. Pt. is indep w/ HEP [x] Met  [] Partially Met  [] Not Met   Long Term Goal 3: Patient will increase AROM of L thumb from current by 10-13° to increase performance with I/ADL's. See chart below [] Met  [x] Partially Met  [] Not Met   Long Term Goal 4: Patient  will increase L  strength from current by 10-15 lbs to increase performance with I/ADL's. See chart below. Strength and ROM improving weekly. [] Met  [x] Partially Met  [] Not Met   Long Term Goal 5: Patient  will increase L pinch strength from current by 3-5 lbs to increase performance with I/ADL's. See chart below.  [x] Met  [] Partially Met  [] Not Met   Long Term Goal 6: Patient  will increase dexterity in L hand as observed by 9 hole peg test by decreasing time from current  by 1-2 seconds to increase performance with I/ADL's NT at this time [] Met  [] Partially Met  [x] Not Met   Long Term Goal 7: Pt. will be able to utilize  handle during bike riding without pain. Pt. is able to hold handle , however continues to be fearful thus has not ridden her bike as of this date. [] Met  [x] Partially Met  [] Not Met         Pt. did not initiate therapy sessions immediately after evaluation due to other obligations, thus has only been seen for treatment 3 visits thus far. She is progressing well with therapy.      Average of  Three tries   LEFT  CURRENT      LEFT        Eval     Left     Norm       Mamadou lb. 54 47 46   PALMAR  Pinch  Lb. 10 4 10   LATERAL  Pinch  Lb. 10 8 11       Edema  Circumferential measurements cm     LEFT current Left  initial   Thumb between MP and IP joint 6.7 7.5   IP jt of thumb 6.4 7.1                    Left Norms       A P     Thumb           MP Flexion   50 NT 0-70   IP Flexion   45 NT 0-90   Radial Abduction   WFL NT 0-50   Palmar Adduction   WFL NT 0-40     TREATMENT PLAN:  [] Evaluate & Treat [] Neuromuscular Re-education   [x] Re-evaluation [] Tissue (stress) Loading Program   [x] Pain Management [] PROM/Stretching/AAROM/AROM   [] Edema Management [] Splinting   [] Wound Care/Scar Management [] Desensitization   [] ADL Training [x] Strengthening/Graded Therapeutic Activity   [] Tendon Repair Program [x] Coordination/Dexterity Training   [x] Instruction/Application of energy [] Manual Techniques       conservation, work simplification [] Instruction in HEP       joint protection, body mechanics [] Aquatic Therapy   [x] Modalities: [] Ultrasound   [] Infrared [] Electrical Stimulation [x] Fluidotherapy                          [] Hot/Cold Pack [] Paraffin    [] Other:                             Frequency: 1- 2 days per week  Duration:  3-5 visits     Rehab Potential: [x] Excellent [] Good  [] Fair  [] Poor      Patient Status:    [x] Continue/Initate plan of Care                    []  Discharge OT         []  Additional visits requested, please re-certify for additional visits        Electronically signed by: NARAYAN Cortes 8/23/2022 12:17 PM    If you have any questions or concerns, please don't hesitate to call. Thank you for your referral.      I have reviewed this plan of care and certify a need for medically necessary rehabilitation services.     Physician Signature:__________________________________________________________    Date: 8/23/2022  Please sign and return

## 2022-08-23 NOTE — PROGRESS NOTES
[facility] OCCUPATIONAL THERAPY     Occupational Therapy: Daily Note   Patient: León Duarte [de-identified]56 y.o. female)   Date:   Plan of Care Certification Period:  22   :  1965  MRN: [id number]  CSN: [id number]   Insurance: Payor: Kamran Gonzalez / Plan: Joyce Balblanco [place] / Product Type: *No Product type* /   Insurance ID: [id number] - (Commercial) Secondary Insurance (if applicable):    Referring Physician: Nicole Lane     PCP: Brandie Orr MD Visits to Date: Total # of Visits to Date: 3   Progress note:Progress Note Counter: 3  Visits Approved: 8    No Show:    Cancelled Appts:2     Medical Diagnosis: Stiffness of left hand, not elsewhere classified [M25.642] L thumb stiffness        Therapy Time    Time in 08   Time out 0907   Total treatment minutes 58   Total time code minutes  62 Minutes        OT Therapeutic activities 58 minutes for 4 unit(s), CPT 45176       SUBJECTIVE EXAMINATION     Patient's date of birth confirmed: Yes     Pain Level:   3/10  IP joint of L thumb. Patient Comments:  \"I have joint tenderness in that joint. But I can do what I need to do. \"    Learning/Language barrier: no     HEP/Strategies/Orthosis Compliance: Patient verbally confirmed compliant with HEP's     Restrictions:   NA       OBJECTIVE EXAMINATION   Measured LUE for progress:       Average of  Three tries   LEFT  CURRENT      LEFT        Eval     Left     Norm       Mamadou lb. 54 47 46   PALMAR  Pinch  Lb. 10 4 10   LATERAL  Pinch  Lb. 10 8 11          Edema  Circumferential measurements cm     LEFT current Left  initial   Thumb between MP and IP joint 6.7 7.5   IP jt of thumb 6.4 7.1            Left Norms       A P     Thumb           MP Flexion   50 NT 0-70   IP Flexion   45 NT 0-90   Radial Abduction   WFL NT 0-50   Palmar Adduction   WFL NT 0-40       TREATMENT     Focus of treatment was on the following:   Stabilizing L thumb IP joint and assess for progress toward goals     Measured LUE for progress as above. Pt. participated in activities w/ L thumb:   Thumb oppocizer w/ thick rubber band resistance, 2 x 10  Thumb depressor for IP flexion w/ max resistance, 2 x 10  Rolling up towel tightly w/ L hand only,  10 reps  Instructed pt. in and performed blue therasponge HEP for  LUE, 10 Reps X 3 ex  Fluidotherapy at 115° F for 20 minutes while completing AROM R thumb squeezing foam ball, squeezing foam ball and perform wrist circumduction and pressing into ball with thumb only. Patient Education/HEP:   Continue recommended HEP/activities. , hand strengthening: Patient issued blue foam block. Patient completed 10 reps to demo understanding. Pt with good follow through. ASSESSMENT       Assessment: Pt tolerated treatment well. Pt reported same pain after OT treatment. Pt making good  progress towards goals. AROM improved 9 degrees and  strength improved 7 lbs. Post Treatment Pain: 3/10    Patient's Activity Tolerance:    good            GOALS         Long Term Goals  Time Frame for Long term goals : 2x week x 4-5 weeks  Long Term Goal 1: Patient will report pain 2 or less during functional activities  Long Term Goal 2: Patient  will be indep with all recommended HEP's, adaptive strategies, and adaptive techniques. Long Term Goal 3: Patient will increase AROM of L thumb from current by 10-13° to increase performance with I/ADL's. Long Term Goal 4: Patient  will increase L  strength from current by 10-15 lbs to increase performance with I/ADL's. Long Term Goal 5: Patient  will increase L pinch strength from current by 3-5 lbs to increase performance with I/ADL's. Long Term Goal 6: Patient  will increase dexterity in L hand as observed by 9 hole peg test by decreasing time from current  by 1-2 seconds to increase performance with I/ADL's  Long Term Goal 7: Pt. will be able to utilize  handle during bike riding without pain.          TREATMENT PLAN   Continue 1-2 x week

## 2022-08-25 ENCOUNTER — HOSPITAL ENCOUNTER (OUTPATIENT)
Dept: OCCUPATIONAL THERAPY | Age: 57
Setting detail: THERAPIES SERIES
Discharge: HOME | End: 2022-08-25
Payer: COMMERCIAL

## 2022-08-25 PROCEDURE — 97530 THERAPEUTIC ACTIVITIES: CPT

## 2022-08-25 PROCEDURE — 97140 MANUAL THERAPY 1/> REGIONS: CPT

## 2022-08-25 NOTE — PROGRESS NOTES
[facility] OCCUPATIONAL THERAPY     Occupational Therapy: Daily Note   Patient: Yvrose Duarte [de-identified]56 y.o. female)   Date:   Plan of Care Certification Period:  22   :  1965  MRN: [id number]  CSN: [id number]   Insurance: Payor: Gamal Canseco / Plan: Deedee Hernandez [place] / Product Type: *No Product type* /   Insurance ID: [id number] - (Commercial) Secondary Insurance (if applicable):    Referring Physician: Pili Smith     PCP: Arlington Canavan, MD Visits to Date: Total # of Visits to Date: 4   Progress note:Progress Note Counter: 4  Visits Approved: 8    No Show:    Cancelled Appts:2     Medical Diagnosis: Stiffness of left hand, not elsewhere classified [M25.642] L thumb stiffness        Therapy Time    Time in 0800   Time out 0900   Total treatment minutes 60   Total time code minutes  60 Minutes        OT Manual therapy 8 minutes for 1 unit(s), CPT 29673  OT Therapeutic activities 52 minutes for 3 unit(s), CPT 78906       SUBJECTIVE EXAMINATION     Patient's date of birth confirmed: Yes     Pain Level:   Pt denies pain    Patient Comments: \"My thumb aches only when I use it. Then it can be uncomfortable, like a 4/10 pain. \"    Learning/Language barrier: n/a       HEP/Strategies/Orthosis Compliance: Patient verbally confirmed compliant with HEP's     OBJECTIVE EXAMINATION         TREATMENT     Focus of treatment was on the following:   strengthening and edema control L thumb      MFR/Manual:   Soft tissue mobilization and retrograde massage to entire L Thumb and thenar eminence using Dycem  pad. Instructed pt. in use of Dycem for HEP and demo massage technique to further reduce edema. Pt. demonstrated back proper technique. Joint traction with gentle mob of IP joint completed without pain. Fluidotherapy at 115° F for 20 minutes while completing AROM L thumb flexing into foam ball. Instructed pt. in and pt. performed red theraputty HEP x 10 reps x 3 ex.   Performed thumb strengthening using max resistance thumb oppocizer, 2 x 15 reps and resistive pressing w/ thumb pad against thick rubberband, 2 x 15  Isometric ex for alignment of IP jt. L Thumb completed x 10 reps. for thumb adduction. Patient Education/HEP:   Continue recommended HEP/activities. , hand strengthening: Patient issued red theraputty. , Pt completed 10 reps to demo understanding. Pt with good follow through., Written hand out(s) provided. ASSESSMENT       Assessment: Pt reported no pain after OT treatment. Pt making good  progress towards goals. Pt. reports aching pain during resistive IP flexion tasks Pt. demo good understanding of HEP. Written handouts provided. AROM of thumb is WFL. Post Treatment Pain: Pt denies pain    Patient's Activity Tolerance:   good             GOALS         Long Term Goals  Time Frame for Long term goals : 2x week x 4-5 weeks  Long Term Goal 1: Patient will report pain 2 or less during functional activities  Long Term Goal 2: Patient  will be indep with all recommended HEP's, adaptive strategies, and adaptive techniques. Long Term Goal 3: Patient will increase AROM of L thumb from current by 10-13° to increase performance with I/ADL's. Long Term Goal 4: Patient  will increase L  strength from current by 10-15 lbs to increase performance with I/ADL's. Long Term Goal 5: Patient  will increase L pinch strength from current by 3-5 lbs to increase performance with I/ADL's. Long Term Goal 6: Patient  will increase dexterity in L hand as observed by 9 hole peg test by decreasing time from current  by 1-2 seconds to increase performance with I/ADL's  Long Term Goal 7: Pt. will be able to utilize  handle during bike riding without pain. TREATMENT PLAN   Pt. to continue 1-2 x week for improving stability and alignment of L thumb, as well as strengthening. Pt. to follow up with physician regarding edema.            Electronically signed by BOO Horan/MISA on 8/25/2022 at 11:38 AM

## 2022-08-29 ENCOUNTER — HOSPITAL ENCOUNTER (OUTPATIENT)
Dept: OCCUPATIONAL THERAPY | Age: 57
Setting detail: THERAPIES SERIES
Discharge: HOME | End: 2022-08-29
Payer: COMMERCIAL

## 2022-08-29 NOTE — PROGRESS NOTES
[facility]                                                         Cancel Note/ No Show Note    Date: 2022  Patient Name: Chrystal Viera        MRN: [id number]    Account #: [de-identified]  : 1965  (56 y.o.)  Gender: female     No Show: 1  Canceled Appointment: 2    REASON FOR MISSED TREATMENT:    []Cancelled due to illness.   [] Therapist Canceled Appointment  [x]Cancelled due to other appointment   []No Show / No call.    [] Cancelled due to transportation conflict  []Cancelled due to weather  []Frequency of order changed  []Patient on hold due to:     []OTHER:        Electronically signed by:    Electronically signed by NARAYAN Murillo on 96 at 8:42 AM EDT             Date:2022

## 2022-08-31 ENCOUNTER — HOSPITAL ENCOUNTER (OUTPATIENT)
Dept: OCCUPATIONAL THERAPY | Age: 57
Setting detail: THERAPIES SERIES
Discharge: HOME | End: 2022-08-31
Payer: COMMERCIAL

## 2022-08-31 NOTE — PROGRESS NOTES
Therapy                            Cancellation/No-show Note    Date: 2022  Patient Name: Giovani Duarte    : 1965  (56 y.o.)     MRN: [id number]    Account #: [de-identified]       Canceled Appointment: 4    Comments: For today's appointment patient:  [x]  Cancelled  []  Rescheduled appointment  []  No-show   []  Called pt to remind of next appointment     Reason given by patient:  []  Patient ill  []  Conflicting appointment  []  No transportation    []  Conflict with work  []  No reason given  [x]  Other:  Worried about co-pay cost and unable to attend today. [x] Pt has future appointments scheduled, no follow up needed  [] Pt requests to be on hold. Reason:   If > 2 weeks please discuss with therapist.  [] Therapist to call pt for follow up     Signature:  NARAYAN Bahena 2022 10:20 AM

## 2022-09-06 ENCOUNTER — TELEPHONE (OUTPATIENT)
Dept: PRIMARY CARE CLINIC | Age: 57
End: 2022-09-06

## 2022-09-06 ENCOUNTER — HOSPITAL ENCOUNTER (OUTPATIENT)
Dept: OCCUPATIONAL THERAPY | Age: 57
Setting detail: THERAPIES SERIES
Discharge: HOME | End: 2022-09-06
Payer: COMMERCIAL

## 2022-09-06 ENCOUNTER — OFFICE VISIT (OUTPATIENT)
Dept: PRIMARY CARE CLINIC | Age: 57
End: 2022-09-06
Payer: COMMERCIAL

## 2022-09-06 VITALS
SYSTOLIC BLOOD PRESSURE: 118 MMHG | DIASTOLIC BLOOD PRESSURE: 70 MMHG | RESPIRATION RATE: 18 BRPM | OXYGEN SATURATION: 100 % | HEART RATE: 64 BPM | TEMPERATURE: 97.4 F | WEIGHT: 164.6 LBS | BODY MASS INDEX: 24.95 KG/M2 | HEIGHT: 68 IN

## 2022-09-06 DIAGNOSIS — G62.9 PERIPHERAL POLYNEUROPATHY: Primary | ICD-10-CM

## 2022-09-06 DIAGNOSIS — S63.602D SPRAIN OF LEFT THUMB, UNSPECIFIED SITE OF DIGIT, SUBSEQUENT ENCOUNTER: ICD-10-CM

## 2022-09-06 DIAGNOSIS — Z00.00 HEALTH CARE MAINTENANCE: ICD-10-CM

## 2022-09-06 DIAGNOSIS — K43.9 VENTRAL HERNIA WITHOUT OBSTRUCTION OR GANGRENE: ICD-10-CM

## 2022-09-06 LAB — HBA1C MFR BLD: 5.2 %

## 2022-09-06 PROCEDURE — 83036 HEMOGLOBIN GLYCOSYLATED A1C: CPT | Performed by: INTERNAL MEDICINE

## 2022-09-06 PROCEDURE — 99214 OFFICE O/P EST MOD 30 MIN: CPT | Performed by: INTERNAL MEDICINE

## 2022-09-06 SDOH — ECONOMIC STABILITY: FOOD INSECURITY: WITHIN THE PAST 12 MONTHS, YOU WORRIED THAT YOUR FOOD WOULD RUN OUT BEFORE YOU GOT MONEY TO BUY MORE.: NEVER TRUE

## 2022-09-06 SDOH — ECONOMIC STABILITY: FOOD INSECURITY: WITHIN THE PAST 12 MONTHS, THE FOOD YOU BOUGHT JUST DIDN'T LAST AND YOU DIDN'T HAVE MONEY TO GET MORE.: NEVER TRUE

## 2022-09-06 ASSESSMENT — ENCOUNTER SYMPTOMS
SHORTNESS OF BREATH: 0
COUGH: 0
DIARRHEA: 0
WHEEZING: 0
ABDOMINAL PAIN: 0
VOMITING: 0
NAUSEA: 0

## 2022-09-06 ASSESSMENT — SOCIAL DETERMINANTS OF HEALTH (SDOH): HOW HARD IS IT FOR YOU TO PAY FOR THE VERY BASICS LIKE FOOD, HOUSING, MEDICAL CARE, AND HEATING?: NOT HARD AT ALL

## 2022-09-06 NOTE — PROGRESS NOTES
[address] [facility] [address]    [address], [address] [facility]  Ph: [phone]   Fax: [phone]      Date: 2022    To: Rebecca Jean PA-C From: BOO Carballo/MISA   Patient: Denise Duarte : 1965   MRN: [id number] Diagnosis: Stiffness of left hand, not elsewhere classified [M25.642] Diagnosis: L thumb stiffness     Date of eval: 2022    Visit Information:   Onset Date: 22  OT Insurance Information: [employer]  Total # of Visits Approved: 8  Total # of Visits to Date: 4  Canceled Appointment: 5  Progress Note Counter: 4                              Assessment:      Long Term Goals Current/Discharge status  Met   Long Term Goal 1: Patient will report pain 2 or less during functional activities  [] Met  [] Partially Met  [x] Not Met   Long Term Goal 2: Patient  will be indep with all recommended HEP's, adaptive strategies, and adaptive techniques. [] Met  [] Partially Met  [x] Not Met   Long Term Goal 3: Patient will increase AROM of L thumb from current by 10-13° to increase performance with I/ADL's.  [] Met  [] Partially Met  [x] Not Met   Long Term Goal 4: Patient  will increase L  strength from current by 10-15 lbs to increase performance with I/ADL's.  [] Met  [] Partially Met  [x] Not Met   Long Term Goal 5: Patient  will increase L pinch strength from current by 3-5 lbs to increase performance with I/ADL's.  [] Met  [] Partially Met  [x] Not Met   Long Term Goal 6: Patient  will increase dexterity in L hand as observed by 9 hole peg test by decreasing time from current  by 1-2 seconds to increase performance with I/ADL's  [] Met  [] Partially Met  [x] Not Met   Long Term Goal 7: Pt. will be able to utilize  handle during bike riding without pain.   [] Met  [] Partially Met  [x] Not Met     Functional assessment used: Upper Extremity Functional Index  Score on eval: 57/80=71.25%  Score at d/c: NA    Plan: D/C from OT at this time. Pt. has not obtained goals due to non-compliance with attending scheduled sessions. Pt. reports physician recommended follow up w/ ortho consult. D/C at this time. Thank you for referral of this patient. Electronically signed by:   NARAYAN Mcguire 9/6/2022 12:41 PM
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
## 2022-09-06 NOTE — TELEPHONE ENCOUNTER
Patient wants to know when she should get the COVID Booster and the Flu Injection for her and her .  Forgot to ask you she said

## 2022-09-07 ENCOUNTER — TELEPHONE (OUTPATIENT)
Dept: PRIMARY CARE CLINIC | Age: 57
End: 2022-09-07

## 2022-09-07 ENCOUNTER — OFFICE VISIT (OUTPATIENT)
Dept: ORTHOPEDIC SURGERY | Age: 57
End: 2022-09-07
Payer: COMMERCIAL

## 2022-09-07 VITALS
BODY MASS INDEX: 24.86 KG/M2 | OXYGEN SATURATION: 98 % | HEIGHT: 68 IN | WEIGHT: 164 LBS | TEMPERATURE: 97.9 F | HEART RATE: 69 BPM

## 2022-09-07 DIAGNOSIS — S62.515A CLOSED NONDISPLACED FRACTURE OF PROXIMAL PHALANX OF LEFT THUMB, INITIAL ENCOUNTER: Primary | ICD-10-CM

## 2022-09-07 DIAGNOSIS — S63.622A SPRAIN OF INTERPHALANGEAL JOINT OF LEFT THUMB, INITIAL ENCOUNTER: ICD-10-CM

## 2022-09-07 PROCEDURE — 99203 OFFICE O/P NEW LOW 30 MIN: CPT | Performed by: PHYSICIAN ASSISTANT

## 2022-09-07 PROCEDURE — 26740 TREAT FINGER FRACTURE EACH: CPT | Performed by: PHYSICIAN ASSISTANT

## 2022-09-07 ASSESSMENT — ENCOUNTER SYMPTOMS
GASTROINTESTINAL NEGATIVE: 1
RESPIRATORY NEGATIVE: 1
EYES NEGATIVE: 1
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
## 2022-09-07 NOTE — TELEPHONE ENCOUNTER
Pt called in and said they got in with their previous referral so they no longer need another new one. Ignore previous message.

## 2022-09-07 NOTE — TELEPHONE ENCOUNTER
----- Message from Taras Larsen sent at 9/7/2022 10:00 AM EDT -----  Subject: Referral Request    Reason for referral request? Pt was referred by PCP yesterday to a   orthopedic provider but wont be able to be seen until 9/30, would like to   know if PCP would be able to to send another referral for a different   orthopedic provider to get pt in sooner. Please contact as soon as   possible with an an answer. Provider patient wants to be referred to(if known):     Provider Phone Number(if known):     Additional Information for Provider?   ---------------------------------------------------------------------------  --------------  [id number] [employer]    [phone]; OK to leave message on voicemail  ---------------------------------------------------------------------------  --------------

## 2022-09-07 NOTE — PROGRESS NOTES
Occupational therapy clinical note:    9/7/2022  Pt. into department following ortho consult. Ortho physician recommended pt. continue with her therapy as she has completed only 4 of 8 approved visits. Recent X-ray indicates healing fracture of thumb at IP joint with edema present. Pt. to schedule visits as requested to complete POC. Updated status will be sent to physician.

## 2022-09-08 ENCOUNTER — APPOINTMENT (OUTPATIENT)
Dept: OCCUPATIONAL THERAPY | Age: 57
End: 2022-09-08
Payer: COMMERCIAL

## 2022-09-09 ENCOUNTER — OFFICE VISIT (OUTPATIENT)
Dept: SURGERY | Age: 57
End: 2022-09-09
Payer: COMMERCIAL

## 2022-09-09 VITALS
OXYGEN SATURATION: 99 % | TEMPERATURE: 97.8 F | HEART RATE: 64 BPM | WEIGHT: 164 LBS | BODY MASS INDEX: 24.86 KG/M2 | HEIGHT: 68 IN

## 2022-09-09 DIAGNOSIS — K43.9 VENTRAL HERNIA WITHOUT OBSTRUCTION OR GANGRENE: Primary | ICD-10-CM

## 2022-09-09 PROCEDURE — 99203 OFFICE O/P NEW LOW 30 MIN: CPT | Performed by: COLON & RECTAL SURGERY

## 2022-09-09 ASSESSMENT — ENCOUNTER SYMPTOMS
DIARRHEA: 0
CHEST TIGHTNESS: 0
ABDOMINAL PAIN: 1
COLOR CHANGE: 0
VOMITING: 0
SHORTNESS OF BREATH: 0
CONSTIPATION: 0

## 2022-09-09 NOTE — PROGRESS NOTES
Subjective:      Patient ID: Katy Mead. Ashley Tang is a 56 y.o. female who presents for:  Chief Complaint   Patient presents with    Hernia       This is a 56-year-old female who comes to the office with a symptomatic ventral hernia. She had an MRI in 2021 which showed this hernia present. I reviewed those films with her. She has a history of cirrhosis from drinking but no longer does any type of alcohol consumption. She denies any recent medical problems.     Medication list was reviewed      Past Medical History:   Diagnosis Date    Allergic rhinitis     Allergies     Asthma     Closed dislocation of finger of left hand 2018    Dermoid tumor     Essential hypertension 5/15/2018    Hyperthyroidism     Thyroid disease      Past Surgical History:   Procedure Laterality Date    LAPAROSCOPY Right 10/20/2021    LAPARSCOPIC ADNEXAL MASS REMOVAL WITH  RIGHT SALPINGO OOPHORECTOMY; INTRAUTERINE DEVICE REMOVAL performed by Dario Stearns MD at [address] Bilateral 2021    MANDIBLE SURGERY      OVARY REMOVAL Right 10/2021    due to cyst     Social History     Socioeconomic History    Marital status:      Spouse name: Magy Morgan    Number of children: 0    Years of education: 23    Highest education level: Professional school degree (e.g., MD, DDS, DVM, VIVIENNE)   Occupational History    Occupation: retired   Tobacco Use    Smoking status: Former     Packs/day: 0.25     Years: 5.00     Pack years: 1.25     Types: Cigarettes     Quit date: 1990     Years since quittin.1    Smokeless tobacco: Never   Vaping Use    Vaping Use: Never used   Substance and Sexual Activity    Alcohol use: Not Currently    Drug use: Not Currently    Sexual activity: Yes     Partners: Male     Comment:  and monogamous   Other Topics Concern    Not on file   Social History Narrative    Not on file     Social Determinants of Health     Financial Resource Strain: Low Risk     Difficulty of Paying Living Expenses: Not hard at all   Food Insecurity: No Food Insecurity    Worried About [address] in the Last Year: Never true    Ran Out of Food in the Last Year: Never true   Transportation Needs: No Transportation Needs    Lack of Transportation (Medical): No    Lack of Transportation (Non-Medical): No   Physical Activity: Not on file   Stress: Not on file   Social Connections: Not on file   Intimate Partner Violence: Not on file   Housing Stability: Not on file     Family History   Problem Relation Age of Onset    Anemia Mother     Asthma Mother     Obesity Mother     Alcohol Abuse Neg Hx     Allergy (Severe) Neg Hx     Arthritis Neg Hx     Arrhythmia Neg Hx     Atrial Fibrillation Neg Hx     Birth Defects Neg Hx     Breast Cancer Neg Hx     Cancer Neg Hx     Coronary Art Dis Neg Hx     Colon Cancer Neg Hx     Depression Neg Hx     Diabetes Neg Hx     Early Death Neg Hx     Hearing Loss Neg Hx     Heart Attack Neg Hx     Heart Disease Neg Hx     High Blood Pressure Neg Hx     High Cholesterol Neg Hx     Kidney Disease Neg Hx     Learning Disabilities Neg Hx     Mental Illness Neg Hx     Mental Retardation Neg Hx     Miscarriages / Stillbirths Neg Hx     Osteoporosis Neg Hx     Prostate Cancer Neg Hx     Stroke Neg Hx     Substance Abuse Neg Hx     Vision Loss Neg Hx      Allergies:  Patient has no known allergies. Review of Systems   Constitutional:  Negative for activity change, appetite change, fever and unexpected weight change. HENT:  Negative for congestion. Respiratory:  Negative for chest tightness and shortness of breath. Gastrointestinal:  Positive for abdominal pain. Negative for constipation, diarrhea and vomiting. Genitourinary:  Negative for difficulty urinating. Musculoskeletal:  Negative for arthralgias. Skin:  Negative for color change. Neurological:  Negative for dizziness and headaches. Hematological:  Does not bruise/bleed easily. Psychiatric/Behavioral:  Negative for agitation. Objective:    Pulse 64   Temp 97.8 °F (36.6 °C) (Temporal)   Ht 5' 8\" (1.727 m)   Wt 164 lb (74.4 kg)   LMP  (LMP Unknown)   SpO2 99%   BMI 24.94 kg/m²     Physical Exam  Constitutional:       Appearance: She is well-developed. HENT:      Head: Normocephalic and atraumatic. Eyes:      Pupils: Pupils are equal, round, and reactive to light. Cardiovascular:      Rate and Rhythm: Normal rate and regular rhythm. Heart sounds: Normal heart sounds. Pulmonary:      Effort: Pulmonary effort is normal. No respiratory distress. Breath sounds: Normal breath sounds. No wheezing. Abdominal:      General: There is no distension. Palpations: There is no mass. Tenderness: There is no abdominal tenderness. There is no guarding or rebound. Hernia: A hernia is present. Comments: Reducible supraumbilical hernia. No skin changes. Tender on complete reduction. Musculoskeletal:         General: Normal range of motion. Cervical back: Normal range of motion and neck supple. Skin:     General: Skin is warm and dry. Coloration: Skin is not pale. Findings: No erythema or rash. Neurological:      Mental Status: She is alert and oriented to person, place, and time. Psychiatric:         Behavior: Behavior normal.         Judgment: Judgment normal.            Assessment/Plan:          Diagnosis Orders   1. Ventral hernia without obstruction or gangrene          Using her MRI as well as anatomic imaging, I described the risks and benefits of ventral hernia repair with mesh. Risks of the procedure including infection, bleeding, damage to the underlying intestine recurrence reoperation postoperative pain and mesh complications were all addressed. Nonoperative alternatives were given but not recommended    Despite the risks, she wishes to proceed with ventral hernia repair with mesh. Consent obtained.             Please note this report has beenpartially produced using speech recognition software and may cause contain errors related to that system including grammar, punctuation and spelling as well as words and phrases that may seem inappropriate.  If there arequestions or concerns please feel free to contact me to clarify

## 2022-09-14 ENCOUNTER — ANESTHESIA EVENT (OUTPATIENT)
Dept: OPERATING ROOM | Age: 57
End: 2022-09-14
Payer: COMMERCIAL

## 2022-09-14 ENCOUNTER — HOSPITAL ENCOUNTER (OUTPATIENT)
Dept: OCCUPATIONAL THERAPY | Age: 57
Setting detail: THERAPIES SERIES
Discharge: HOME | End: 2022-09-14
Payer: COMMERCIAL

## 2022-09-14 PROCEDURE — 97530 THERAPEUTIC ACTIVITIES: CPT

## 2022-09-14 PROCEDURE — 97168 OT RE-EVAL EST PLAN CARE: CPT

## 2022-09-14 NOTE — PROGRESS NOTES
[facility]     Occupational Therapy: Daily Note   Patient: Miladis Duarte [de-identified]56 y.o. female)   Date:   Plan of Care Certification Period:      :  1965  MRN: [id number]  CSN: [id number]   Insurance: Payor: Gemini Segura / Plan: Nuvia Bravo [place] / Product Type: *No Product type* /   Insurance ID: [id number] - (Commercial) Secondary Insurance (if applicable):    Referring Physician: Phyllis Olmos     PCP: Gema Gonsalves MD Visits to Date: Total # of Visits to Date: 5   Progress note:Progress Note Counter: 5  Visits Approved: 8    No Show:    Cancelled Appts:5     Medical Diagnosis: Stiffness of left hand, not elsewhere classified [M25.642] L thumb stiffness        Therapy Time    Time in 1000   Time out 1100   Total treatment minutes 60   Total time code minutes  60 Minutes        OT Re-evaluation of plan of care 42 minutes for 1 unit, CPT 03083   OT Therapeutic activities 18 minutes for 1 unit(s), CPT 74325       SUBJECTIVE EXAMINATION     Patient's date of birth confirmed: Yes     Pain Level:   Pt denies pain    Patient Comments:   \"I don't have pain, but I have a lot of stiffness in my thumb. \"    Learning/Language barrier: no     HEP/Strategies/Orthosis Compliance: Patient verbally confirmed compliant with HEP's Patient demo understanding. OBJECTIVE EXAMINATION   Re-eval aspects of L Thumb:    Edema  Circumferential measurements cm     Left (current) Left   (eval)   Thumb between MP and IP joint 7.1 7.5   IP jt of thumb 6.7 7.1         THUMB AROM     LEFT        ACTIVE MMT LEFT (INITIAL)     MP Flexion/EXT 45/WNL 4/5 50     IP Flexion/EXT 40/WNL 4-/5 36     Radial  AB WFL WFL WFL     Palmar AB [place] WFL WFL   Comments:          Opposition:  (Pad to Pad)  Can oppose to digit 5 LUE. Distance Thumb Tip from Head of 5th MC:    Rt.       0 Cm          Lt.    0 Cm       Average of  Three tries     RIGHT              CURRENT      Right                 Eval      Right Norm   LEFT  CURRENT      LEFT        Eval     Left     Norm       Mamadou lb. 68 70 51 60 47 46   PALMAR  Pinch  Lb. 15 15 11 11 4 10   LATERAL  Pinch  Lb. 14 18 12 11 8 11     Pain in L thumb with pinch tasks,4-5/10. COORDINATION/DEXTERITY                                                                                          R                                                          L   CURRENT    EVAL   NORMS  CURRENT     EVAL    NORMS    9 HOLE PEG TEST    (seconds)   16.47 19.3 17.8 19.15 20.2 19.4      TREATMENT     Focus of treatment was on the following:   Brief re-eval due to 2 week interrupt,assess for progress toward goals and improving function of L thumb. Re-eval as above. Exercises/Activities:  Thumb MP Flexion w/ IP in extension against mod resistance gel hand Xtrainer, 2 x 10  Gross grasp around gel hand X  and squeeze, 2.15 reps  Thumb AROM sliding pennies from palm of hand to tip of index finger, tip to tip prehension and place in cup x 8 coins. Instructed in 2 more putty ex and provided written handout. Fluidotherapy at 115° F for 10 minutes while completing AROM flexing L thumb into foam ball. Patient Education/HEP:    upgraded putty HEP, Continue recommended HEP/activities. ASSESSMENT       Assessment: Pt. returned to therapy after 2 weeks per physician recommendation to complete therapy due to healing fx of L thumb, distal phalanx. Pt tolerated treatment well. Pt reported NO pain after OT treatment. Pt. continues to have pain with pinch tasks. Edema improved, however remains around L thumb causing persistent stiffness. Pt. demo good understanding of HEP.         Post Treatment Pain: Pt denies pain    Patient's Activity Tolerance: good                 GOALS         Long Term Goals  Time Frame for Long term goals : 2x week x 4-5 weeks  Long Term Goal 1: Patient will report pain 2 or less during functional activities  Long Term Goal 2: Patient  will be indep with all recommended HEP's, adaptive strategies, and adaptive techniques. Long Term Goal 3: Patient will increase AROM of L thumb from current by 10-13° to increase performance with I/ADL's. Long Term Goal 4: Patient  will increase L  strength from current by 10-15 lbs to increase performance with I/ADL's. Long Term Goal 5: Patient  will increase L pinch strength from current by 3-5 lbs to increase performance with I/ADL's. Long Term Goal 6: Patient  will increase dexterity in L hand as observed by 9 hole peg test by decreasing time from current  by 1-2 seconds to increase performance with I/ADL's  Long Term Goal 7: Pt. will be able to utilize  handle during bike riding without pain. TREATMENT PLAN   Will continue with previous goals, except goal 6. Focus on strengthening and fine motor control L thumb for ADL without pain. Pt. to have abdominal surgery this week, thus will call dept. if able to attend next week.            Electronically signed by NARAYAN Elliott  on 9/14/2022 at 11:43 AM

## 2022-09-15 ENCOUNTER — APPOINTMENT (OUTPATIENT)
Dept: OCCUPATIONAL THERAPY | Age: 57
End: 2022-09-15
Payer: COMMERCIAL

## 2022-09-15 ENCOUNTER — ANESTHESIA (OUTPATIENT)
Dept: OPERATING ROOM | Age: 57
End: 2022-09-15
Payer: COMMERCIAL

## 2022-09-15 ENCOUNTER — HOSPITAL ENCOUNTER (OUTPATIENT)
Age: 57
Setting detail: OUTPATIENT SURGERY
Discharge: HOME | End: 2022-09-15
Attending: COLON & RECTAL SURGERY | Admitting: COLON & RECTAL SURGERY
Payer: COMMERCIAL

## 2022-09-15 VITALS
HEIGHT: 68 IN | HEART RATE: 56 BPM | RESPIRATION RATE: 16 BRPM | BODY MASS INDEX: 24.86 KG/M2 | DIASTOLIC BLOOD PRESSURE: 77 MMHG | SYSTOLIC BLOOD PRESSURE: 127 MMHG | WEIGHT: 164 LBS | OXYGEN SATURATION: 99 % | TEMPERATURE: 96.6 F

## 2022-09-15 DIAGNOSIS — K43.9 VENTRAL HERNIA WITHOUT OBSTRUCTION OR GANGRENE: Primary | ICD-10-CM

## 2022-09-15 LAB
ANION GAP SERPL CALCULATED.3IONS-SCNC: 13 MEQ/L (ref 9–15)
BASOPHILS ABSOLUTE: 0 K/UL (ref 0–0.2)
BASOPHILS RELATIVE PERCENT: 0.7 %
BUN BLDV-MCNC: 25 MG/DL (ref 6–20)
CALCIUM SERPL-MCNC: 9.3 MG/DL (ref 8.5–9.9)
CHLORIDE BLD-SCNC: 99 MEQ/L (ref 95–107)
CO2: 25 MEQ/L (ref 20–31)
CREAT SERPL-MCNC: 0.79 MG/DL (ref 0.5–0.9)
EKG ATRIAL RATE: 60 BPM
EKG P AXIS: 40 DEGREES
EKG P-R INTERVAL: 226 MS
EKG Q-T INTERVAL: 450 MS
EKG QRS DURATION: 68 MS
EKG QTC CALCULATION (BAZETT): 450 MS
EKG R AXIS: -3 DEGREES
EKG T AXIS: 48 DEGREES
EKG VENTRICULAR RATE: 60 BPM
EOSINOPHILS ABSOLUTE: 0.2 K/UL (ref 0–0.7)
EOSINOPHILS RELATIVE PERCENT: 3.5 %
GFR AFRICAN AMERICAN: >60
GFR NON-AFRICAN AMERICAN: >60
GLUCOSE BLD-MCNC: 108 MG/DL (ref 70–99)
HCT VFR BLD CALC: 36.3 % (ref 37–47)
HEMOGLOBIN: 12.4 G/DL (ref 12–16)
LYMPHOCYTES ABSOLUTE: 1.4 K/UL (ref 1–4.8)
LYMPHOCYTES RELATIVE PERCENT: 30.5 %
MCH RBC QN AUTO: 28.8 PG (ref 27–31.3)
MCHC RBC AUTO-ENTMCNC: 34.1 % (ref 33–37)
MCV RBC AUTO: 84.5 FL (ref 82–100)
MONOCYTES ABSOLUTE: 0.4 K/UL (ref 0.2–0.8)
MONOCYTES RELATIVE PERCENT: 9.9 %
NEUTROPHILS ABSOLUTE: 2.5 K/UL (ref 1.4–6.5)
NEUTROPHILS RELATIVE PERCENT: 55.4 %
PDW BLD-RTO: 14.7 % (ref 11.5–14.5)
PLATELET # BLD: 214 K/UL (ref 130–400)
POTASSIUM SERPL-SCNC: 3.7 MEQ/L (ref 3.4–4.9)
RBC # BLD: 4.3 M/UL (ref 4.2–5.4)
SODIUM BLD-SCNC: 137 MEQ/L (ref 135–144)
WBC # BLD: 4.5 K/UL (ref 4.8–10.8)

## 2022-09-15 PROCEDURE — 3700000001 HC ADD 15 MINUTES (ANESTHESIA): Performed by: COLON & RECTAL SURGERY

## 2022-09-15 PROCEDURE — A4217 STERILE WATER/SALINE, 500 ML: HCPCS | Performed by: COLON & RECTAL SURGERY

## 2022-09-15 PROCEDURE — 2500000003 HC RX 250 WO HCPCS: Performed by: ANESTHESIOLOGY

## 2022-09-15 PROCEDURE — 6370000000 HC RX 637 (ALT 250 FOR IP)

## 2022-09-15 PROCEDURE — 3600000004 HC SURGERY LEVEL 4 BASE: Performed by: COLON & RECTAL SURGERY

## 2022-09-15 PROCEDURE — 93005 ELECTROCARDIOGRAM TRACING: CPT

## 2022-09-15 PROCEDURE — 6360000002 HC RX W HCPCS: Performed by: NURSE ANESTHETIST, CERTIFIED REGISTERED

## 2022-09-15 PROCEDURE — 49560 PR REPAIR INCISIONAL HERNIA,REDUCIBLE: CPT | Performed by: COLON & RECTAL SURGERY

## 2022-09-15 PROCEDURE — 3700000000 HC ANESTHESIA ATTENDED CARE: Performed by: COLON & RECTAL SURGERY

## 2022-09-15 PROCEDURE — 7100000001 HC PACU RECOVERY - ADDTL 15 MIN: Performed by: COLON & RECTAL SURGERY

## 2022-09-15 PROCEDURE — 3600000014 HC SURGERY LEVEL 4 ADDTL 15MIN: Performed by: COLON & RECTAL SURGERY

## 2022-09-15 PROCEDURE — 6370000000 HC RX 637 (ALT 250 FOR IP): Performed by: ANESTHESIOLOGY

## 2022-09-15 PROCEDURE — 7100000011 HC PHASE II RECOVERY - ADDTL 15 MIN: Performed by: COLON & RECTAL SURGERY

## 2022-09-15 PROCEDURE — 6360000002 HC RX W HCPCS: Performed by: ANESTHESIOLOGY

## 2022-09-15 PROCEDURE — 6360000002 HC RX W HCPCS: Performed by: COLON & RECTAL SURGERY

## 2022-09-15 PROCEDURE — 6370000000 HC RX 637 (ALT 250 FOR IP): Performed by: COLON & RECTAL SURGERY

## 2022-09-15 PROCEDURE — 7100000000 HC PACU RECOVERY - FIRST 15 MIN: Performed by: COLON & RECTAL SURGERY

## 2022-09-15 PROCEDURE — 2709999900 HC NON-CHARGEABLE SUPPLY: Performed by: COLON & RECTAL SURGERY

## 2022-09-15 PROCEDURE — 64488 TAP BLOCK BI INJECTION: CPT | Performed by: ANESTHESIOLOGY

## 2022-09-15 PROCEDURE — 2580000003 HC RX 258: Performed by: COLON & RECTAL SURGERY

## 2022-09-15 PROCEDURE — C1781 MESH (IMPLANTABLE): HCPCS | Performed by: COLON & RECTAL SURGERY

## 2022-09-15 PROCEDURE — 85025 COMPLETE CBC W/AUTO DIFF WBC: CPT

## 2022-09-15 PROCEDURE — 7100000010 HC PHASE II RECOVERY - FIRST 15 MIN: Performed by: COLON & RECTAL SURGERY

## 2022-09-15 PROCEDURE — 2580000003 HC RX 258: Performed by: ANESTHESIOLOGY

## 2022-09-15 PROCEDURE — 80048 BASIC METABOLIC PNL TOTAL CA: CPT

## 2022-09-15 DEVICE — MESH HERN M DIA6.4CM VENTRAL POLYPR EPTFE CIR SELF EXP PTCH: Type: IMPLANTABLE DEVICE | Site: ABDOMEN | Status: FUNCTIONAL

## 2022-09-15 RX ORDER — DEXAMETHASONE SODIUM PHOSPHATE 4 MG/ML
INJECTION, SOLUTION INTRA-ARTICULAR; INTRALESIONAL; INTRAMUSCULAR; INTRAVENOUS; SOFT TISSUE PRN
Status: DISCONTINUED | OUTPATIENT
Start: 2022-09-15 | End: 2022-09-15 | Stop reason: SDUPTHER

## 2022-09-15 RX ORDER — SODIUM CHLORIDE, SODIUM LACTATE, POTASSIUM CHLORIDE, CALCIUM CHLORIDE 600; 310; 30; 20 MG/100ML; MG/100ML; MG/100ML; MG/100ML
INJECTION, SOLUTION INTRAVENOUS CONTINUOUS
Status: DISCONTINUED | OUTPATIENT
Start: 2022-09-15 | End: 2022-09-15 | Stop reason: HOSPADM

## 2022-09-15 RX ORDER — LABETALOL HYDROCHLORIDE 5 MG/ML
10 INJECTION, SOLUTION INTRAVENOUS
Status: DISCONTINUED | OUTPATIENT
Start: 2022-09-15 | End: 2022-09-15 | Stop reason: HOSPADM

## 2022-09-15 RX ORDER — PROPOFOL 10 MG/ML
INJECTION, EMULSION INTRAVENOUS PRN
Status: DISCONTINUED | OUTPATIENT
Start: 2022-09-15 | End: 2022-09-15 | Stop reason: SDUPTHER

## 2022-09-15 RX ORDER — OXYCODONE HYDROCHLORIDE 5 MG/1
5 TABLET ORAL
Status: COMPLETED | OUTPATIENT
Start: 2022-09-15 | End: 2022-09-15

## 2022-09-15 RX ORDER — ONDANSETRON 2 MG/ML
INJECTION INTRAMUSCULAR; INTRAVENOUS PRN
Status: DISCONTINUED | OUTPATIENT
Start: 2022-09-15 | End: 2022-09-15 | Stop reason: SDUPTHER

## 2022-09-15 RX ORDER — MIDAZOLAM HYDROCHLORIDE 1 MG/ML
INJECTION INTRAMUSCULAR; INTRAVENOUS PRN
Status: DISCONTINUED | OUTPATIENT
Start: 2022-09-15 | End: 2022-09-15 | Stop reason: SDUPTHER

## 2022-09-15 RX ORDER — DEXTROSE MONOHYDRATE 100 MG/ML
INJECTION, SOLUTION INTRAVENOUS CONTINUOUS PRN
Status: DISCONTINUED | OUTPATIENT
Start: 2022-09-15 | End: 2022-09-15 | Stop reason: HOSPADM

## 2022-09-15 RX ORDER — ROPIVACAINE HYDROCHLORIDE 2 MG/ML
INJECTION, SOLUTION EPIDURAL; INFILTRATION; PERINEURAL
Status: COMPLETED | OUTPATIENT
Start: 2022-09-15 | End: 2022-09-15

## 2022-09-15 RX ORDER — SODIUM CHLORIDE 9 MG/ML
25 INJECTION, SOLUTION INTRAVENOUS PRN
Status: DISCONTINUED | OUTPATIENT
Start: 2022-09-15 | End: 2022-09-15 | Stop reason: HOSPADM

## 2022-09-15 RX ORDER — OXYCODONE HYDROCHLORIDE AND ACETAMINOPHEN 5; 325 MG/1; MG/1
1 TABLET ORAL EVERY 6 HOURS PRN
Qty: 12 TABLET | Refills: 0 | Status: SHIPPED | OUTPATIENT
Start: 2022-09-15 | End: 2022-09-18

## 2022-09-15 RX ORDER — FENTANYL CITRATE 50 UG/ML
INJECTION, SOLUTION INTRAMUSCULAR; INTRAVENOUS PRN
Status: DISCONTINUED | OUTPATIENT
Start: 2022-09-15 | End: 2022-09-15 | Stop reason: SDUPTHER

## 2022-09-15 RX ORDER — SODIUM CHLORIDE 0.9 % (FLUSH) 0.9 %
5-40 SYRINGE (ML) INJECTION EVERY 12 HOURS SCHEDULED
Status: DISCONTINUED | OUTPATIENT
Start: 2022-09-15 | End: 2022-09-15 | Stop reason: HOSPADM

## 2022-09-15 RX ORDER — WOUND DRESSING ADHESIVE - LIQUID
LIQUID MISCELLANEOUS PRN
Status: DISCONTINUED | OUTPATIENT
Start: 2022-09-15 | End: 2022-09-15 | Stop reason: HOSPADM

## 2022-09-15 RX ORDER — IPRATROPIUM BROMIDE AND ALBUTEROL SULFATE 2.5; .5 MG/3ML; MG/3ML
1 SOLUTION RESPIRATORY (INHALATION)
Status: DISCONTINUED | OUTPATIENT
Start: 2022-09-15 | End: 2022-09-15 | Stop reason: HOSPADM

## 2022-09-15 RX ORDER — SODIUM CHLORIDE 0.9 % (FLUSH) 0.9 %
5-40 SYRINGE (ML) INJECTION PRN
Status: DISCONTINUED | OUTPATIENT
Start: 2022-09-15 | End: 2022-09-15 | Stop reason: HOSPADM

## 2022-09-15 RX ORDER — LIDOCAINE HYDROCHLORIDE 20 MG/ML
INJECTION, SOLUTION INTRAVENOUS PRN
Status: DISCONTINUED | OUTPATIENT
Start: 2022-09-15 | End: 2022-09-15 | Stop reason: SDUPTHER

## 2022-09-15 RX ORDER — ONDANSETRON 2 MG/ML
4 INJECTION INTRAMUSCULAR; INTRAVENOUS
Status: DISCONTINUED | OUTPATIENT
Start: 2022-09-15 | End: 2022-09-15 | Stop reason: HOSPADM

## 2022-09-15 RX ORDER — MEPERIDINE HYDROCHLORIDE 25 MG/ML
12.5 INJECTION INTRAMUSCULAR; INTRAVENOUS; SUBCUTANEOUS EVERY 5 MIN PRN
Status: DISCONTINUED | OUTPATIENT
Start: 2022-09-15 | End: 2022-09-15 | Stop reason: HOSPADM

## 2022-09-15 RX ORDER — HYDRALAZINE HYDROCHLORIDE 20 MG/ML
10 INJECTION INTRAMUSCULAR; INTRAVENOUS
Status: DISCONTINUED | OUTPATIENT
Start: 2022-09-15 | End: 2022-09-15 | Stop reason: HOSPADM

## 2022-09-15 RX ORDER — METOCLOPRAMIDE HYDROCHLORIDE 5 MG/ML
10 INJECTION INTRAMUSCULAR; INTRAVENOUS
Status: DISCONTINUED | OUTPATIENT
Start: 2022-09-15 | End: 2022-09-15 | Stop reason: HOSPADM

## 2022-09-15 RX ORDER — FENTANYL CITRATE 50 UG/ML
25 INJECTION, SOLUTION INTRAMUSCULAR; INTRAVENOUS EVERY 5 MIN PRN
Status: DISCONTINUED | OUTPATIENT
Start: 2022-09-15 | End: 2022-09-15 | Stop reason: HOSPADM

## 2022-09-15 RX ORDER — ROCURONIUM BROMIDE 10 MG/ML
INJECTION, SOLUTION INTRAVENOUS PRN
Status: DISCONTINUED | OUTPATIENT
Start: 2022-09-15 | End: 2022-09-15 | Stop reason: SDUPTHER

## 2022-09-15 RX ADMIN — DEXAMETHASONE SODIUM PHOSPHATE 4 MG: 4 INJECTION, SOLUTION INTRAMUSCULAR; INTRAVENOUS at 09:36

## 2022-09-15 RX ADMIN — PROPOFOL 150 MG: 10 INJECTION, EMULSION INTRAVENOUS at 09:32

## 2022-09-15 RX ADMIN — FENTANYL CITRATE 50 MCG: 50 INJECTION, SOLUTION INTRAMUSCULAR; INTRAVENOUS at 09:52

## 2022-09-15 RX ADMIN — OXYCODONE 5 MG: 5 TABLET ORAL at 11:21

## 2022-09-15 RX ADMIN — FENTANYL CITRATE 50 MCG: 50 INJECTION, SOLUTION INTRAMUSCULAR; INTRAVENOUS at 09:32

## 2022-09-15 RX ADMIN — ROCURONIUM BROMIDE 40 MG: 10 INJECTION INTRAVENOUS at 09:32

## 2022-09-15 RX ADMIN — CEFAZOLIN 2000 MG: 10 INJECTION, POWDER, FOR SOLUTION INTRAVENOUS at 09:39

## 2022-09-15 RX ADMIN — ROPIVACAINE HYDROCHLORIDE 60 ML: 2 INJECTION, SOLUTION EPIDURAL; INFILTRATION at 09:40

## 2022-09-15 RX ADMIN — LIDOCAINE HYDROCHLORIDE 40 MG: 20 INJECTION, SOLUTION INTRAVENOUS at 09:32

## 2022-09-15 RX ADMIN — MIDAZOLAM HYDROCHLORIDE 2 MG: 1 INJECTION, SOLUTION INTRAMUSCULAR; INTRAVENOUS at 09:26

## 2022-09-15 RX ADMIN — ONDANSETRON 4 MG: 2 INJECTION INTRAMUSCULAR; INTRAVENOUS at 09:49

## 2022-09-15 RX ADMIN — SODIUM CHLORIDE, POTASSIUM CHLORIDE, SODIUM LACTATE AND CALCIUM CHLORIDE: 600; 310; 30; 20 INJECTION, SOLUTION INTRAVENOUS at 08:14

## 2022-09-15 ASSESSMENT — PAIN SCALES - GENERAL
PAINLEVEL_OUTOF10: 4
PAINLEVEL_OUTOF10: 3
PAINLEVEL_OUTOF10: 3

## 2022-09-15 ASSESSMENT — PAIN - FUNCTIONAL ASSESSMENT: PAIN_FUNCTIONAL_ASSESSMENT: 0-10

## 2022-09-15 ASSESSMENT — PAIN DESCRIPTION - ORIENTATION: ORIENTATION: MID

## 2022-09-15 ASSESSMENT — PAIN DESCRIPTION - LOCATION: LOCATION: ABDOMEN

## 2022-09-15 NOTE — ANESTHESIA POSTPROCEDURE EVALUATION
Department of Anesthesiology  Postprocedure Note    Patient: Leonid Baez  MRN: [id number]  YOB: 1965  Date of evaluation: 9/15/2022      Procedure Summary     Date: 09/15/22 Room / Location: [address]    Anesthesia Start: 3629 Anesthesia Stop:     Procedure: VENTRAL HERNIA REPAIR WITH MESH Diagnosis:       Ventral incisional hernia      (VENTRAL HERNIA)    Surgeons: Chandana Hameed MD Responsible Provider: Nannette Donnelly MD    Anesthesia Type: General, Regional ASA Status: 2          Anesthesia Type: General, Regional    Kim Phase I:      Kim Phase II:        Anesthesia Post Evaluation    Patient location during evaluation: PACU  Patient participation: complete - patient participated  Level of consciousness: awake and alert  Pain score: 0  Airway patency: patent  Nausea & Vomiting: no nausea and no vomiting  Complications: no  Cardiovascular status: hemodynamically stable  Respiratory status: acceptable, spontaneous ventilation, nonlabored ventilation and face mask  Hydration status: stable

## 2022-09-15 NOTE — BRIEF OP NOTE
Brief Postoperative Note      Patient: Mars Arana. [address]  YOB: 1965  MRN: [id number]    Date of Procedure: 9/15/2022    Pre-Op Diagnosis: VENTRAL HERNIA    Post-Op Diagnosis: Same       Procedure(s):  VENTRAL HERNIA REPAIR WITH MESH    Surgeon(s):  Magy Dick MD    Assistant:  First Assistant: Silvia Pillai    Anesthesia: General    Estimated Blood Loss (mL): 20    Complications: None    Specimens:   * No specimens in log *    Implants:  Implant Name Type Inv. Item Serial No.  Lot No. LRB No. Used Action   [address] 4CM VENTRAL POLYPR EPTFE CIR SELF EXP PTCH - [id number]  [address] 4CM VENTRAL POLYPR EPTFE CIR SELF EXP [facility] [id number] N/A 1 Implanted         Drains: * No LDAs found *    Findings: 3 x 3 cm ventral hernia supraumbilical repaired with medium mesh    Electronically signed by Lubna Naik MD on 9/15/2022 at 10:09 AM

## 2022-09-15 NOTE — DISCHARGE INSTRUCTIONS
Band-Aids x48 hours    Steri-Strips until office    No lifting greater than 10 pounds for the next 2 weeks    May shower 48 hours    No driving while on pain medication

## 2022-09-15 NOTE — PROGRESS NOTES
Discharge instructions and script reviewed with pt and , both verbalized understanding.   Pt tolerating oral fluids and void without difficulty, IV discontinued

## 2022-09-15 NOTE — ANESTHESIA PROCEDURE NOTES
Peripheral Block    Patient location during procedure: pre-op  Reason for block: post-op pain management and at surgeon's request  Start time: 9/15/2022 9:40 AM  End time: 9/15/2022 9:43 AM  Staffing  Performed: anesthesiologist   Anesthesiologist: Cecilio Valencia MD  Preanesthetic Checklist  Completed: patient identified, IV checked, site marked, risks and benefits discussed, surgical/procedural consents, equipment checked, pre-op evaluation, timeout performed, anesthesia consent given, oxygen available and monitors applied/VS acknowledged  Peripheral Block   Patient position: supine  Prep: ChloraPrep  Provider prep: mask and sterile gloves (Sterile probe cover)  Patient monitoring: cardiac monitor, continuous pulse ox, frequent blood pressure checks and IV access  Block type: Rectus sheath  Laterality: bilateral  Injection technique: single-shot  Guidance: nerve stimulator and ultrasound guided  Local infiltration: ropivacaine  Infiltration strength: 0.25 %  Local infiltration: ropivacaine  Dose: 60 mL    Needle   Needle type: combined needle/nerve stimulator   Needle gauge: 22 G  Needle localization: anatomical landmarks and ultrasound guidance  Needle length: 5 cm  Assessment   Injection assessment: negative aspiration for heme, no paresthesia on injection and local visualized surrounding nerve on ultrasound  Paresthesia pain: immediately resolved  Slow fractionated injection: yes  Hemodynamics: stable  Real-time US image taken/store: yes    Additional Notes  Ultrasound image printed and saved in patient chart.     Sterile probe cover used    Medications Administered  ropivacaine (NAROPIN) injection 0.2% - Perineural   60 mL - 9/15/2022 9:40:00 AM

## 2022-09-15 NOTE — OP NOTE
[address]                      [address], [address] [facility]                                OPERATIVE REPORT    PATIENT NAME: Alessia Hernandez                  :        1965  MED REC NO:   [id number]                            ROOM:  ACCOUNT NO:   [de-identified]                           ADMIT DATE: 09/15/2022  PROVIDER:     Mitch Bowling MD    DATE OF PROCEDURE:  09/15/2022    PREOPERATIVE DIAGNOSIS:  Ventral hernia, supraumbilical.    POSTOPERATIVE DIAGNOSIS:  Ventral hernia, supraumbilical.    PROCEDURE PERFORMED:  Ventral hernia repair with mesh, supraumbilical.    SURGEON:  Mitch Bowling MD    ASSISTANT:  Ms. Schultz Courser. ANESTHESIA:  1. General endotracheal anesthesia. 2.  Rectus sheath block. ESTIMATED BLOOD LOSS:  20 mL. SPECIMENS:  None. COMPLICATIONS:  None. INDICATIONS:  A 70-year-old female with a symptomatic supraumbilical  hernia. This was seen on imaging as well as physical exam.    Risks and benefits of repair with mesh described. Risks of infection,  bleeding, damage to the underlying intestine, reoperation, and postop  pain and mesh complications all addressed. Despite these risks, she wished to proceed. Consent obtained. OPERATIVE PROCEDURE:  She was taken to the operating room, placed in the  supine position. General endotracheal anesthesia was administered. Rectus sheath block was placed preoperatively. Abdomen was prepped and  draped with a ChloraPrep-containing solution. A time-out was taken for  appropriate verification. She received Ancef preoperatively. An incision was made above the umbilicus. Subcutaneous tissues were  incised down to the hernia sac, which was freed up to the anterior  fascia circumferentially. The sac was opened. There was a small amount  of omentum, which was reduced. The sac was amputated completely.     A medium Ventralex mesh was placed subfascially and anchored in six  locations using interrupted 0 Vicryl suture. A 0 Prolene suture was  used to suture the apron to the surrounding fascia. This was done  without problems and under no tension. Excellent hemostasis was achieved and assured. Lap, needle, and  instrument counts were all correct prior to closure. Ancef-containing  solution was used for irrigation. A running 3-0 Vicryl suture was used to close the Kina's fascia. A  4-0 Monocryl in a subcuticular fashion was used to close the skin. Steri-Strips and a Band-Aid were applied as dressing. Following closure; the lap, needle, and instrument counts were all  correct. The patient was extubated and taken to Recovery for postop monitoring.         Kasie Silverio MD    D: 09/15/2022 10:15:39       T: 09/15/2022 10:18:04     [email or web address]  Job#: [id number]     Doc#: [id number]    CC:

## 2022-09-15 NOTE — ANESTHESIA PRE PROCEDURE
Department of Anesthesiology  Preprocedure Note       Name:  Michael Duarte   Age:  56 y.o.  :  1965                                          MRN:  [id number]         Date:  9/15/2022      Surgeon: Elzbieta Burkett):  Nessa James MD    Procedure: Procedure(s):  VENTRAL HERNIA REPAIR WITH MESH (PAT ON ADMIT)    Medications prior to admission:   Prior to Admission medications    Medication Sig Start Date End Date Taking? Authorizing Provider   thiamine 100 MG tablet Take 1 tablet by mouth daily 22  Georgina Flowers MD   potassium chloride (KLOR-CON M20) 20 MEQ extended release tablet TAKE 1 TABLET BY MOUTH TWICE A WEEK 22   John Mauricio MD   folic acid (FOLVITE) 1 MG tablet TAKE 1 TABLET BY MOUTH EVERY DAY 22   John Mauricio MD   desloratadine (CLARINEX) 5 MG tablet TAKE 1 TABLET BY MOUTH EVERY DAY 22   John Mauricio MD   mometasone (ELOCON) 0.1 % cream APPLY TO AFFECTED AREA TOPICALLY EVERY DAY 22   John Mauricio MD   gabapentin (NEURONTIN) 100 MG capsule Take 2 capsules by mouth 3 times daily for 90 days. Intended supply: 90 days 22  John Mauricio MD   montelukast (SINGULAIR) 10 MG tablet Take 1 tablet by mouth nightly 3/29/22   John Mauricio MD   ibuprofen (ADVIL;MOTRIN) 800 MG tablet  21   Historical Provider, MD   triazolam (HALCION) 0.25 MG tablet  21   Historical Provider, MD   clotrimazole-betamethasone (LOTRISONE) 1-0.05 % cream Apply topically 2 times daily. 12/3/21   Tyra Epley, MD   levothyroxine (SYNTHROID) 75 MCG tablet Take 1 tablet by mouth Daily 21   John Mauricio MD   hydroCHLOROthiazide (HYDRODIURIL) 25 MG tablet Take 1 tablet by mouth every morning 21   John Mauricio MD   magnesium oxide (MAG-OX) 400 MG tablet TAKE 1 TABLET BY MOUTH EVERY DAY 21   John Mauricio MD   conjugated estrogens (PREMARIN) 0.625 MG/GM vaginal cream Use 0.5 g pv 2 to 3 times weekly.  21   Zita Biswas MD   WIXELA INHUB 100-50 MCG/DOSE diskus inhaler INHALE 1 PUFF  LASIK Bilateral 2021    MANDIBLE SURGERY      OVARY REMOVAL Right 10/2021    due to cyst       Social History:    Social History     Tobacco Use    Smoking status: Former     Packs/day: 0.25     Years: 5.00     Pack years: 1.25     Types: Cigarettes     Quit date: 1990     Years since quittin.1    Smokeless tobacco: Never   Substance Use Topics    Alcohol use: Not Currently                                Counseling given: Not Answered      Vital Signs (Current):   Vitals:    09/15/22 0749   BP: 114/66   Pulse: 57   Resp: 18   Temp: 98.3 °F (36.8 °C)   TempSrc: Temporal   SpO2: 100%   Weight: 164 lb (74.4 kg)   Height: 5' 8\" (1.727 m)                                              BP Readings from Last 3 Encounters:   09/15/22 114/66   22 118/70   22 114/68       NPO Status: Time of last liquid consumption:                         Time of last solid consumption:                         Date of last liquid consumption: 22                        Date of last solid food consumption: 22    BMI:   Wt Readings from Last 3 Encounters:   09/15/22 164 lb (74.4 kg)   22 164 lb (74.4 kg)   22 164 lb (74.4 kg)     Body mass index is 24.94 kg/m².     CBC:   Lab Results   Component Value Date/Time    WBC 5.0 2021 09:31 AM    RBC 4.34 2021 09:31 AM    HGB 12.7 2021 09:31 AM    HCT 37.2 2021 09:31 AM    MCV 85.7 2021 09:31 AM    RDW 14.0 2021 09:31 AM     2021 09:31 AM       CMP:   Lab Results   Component Value Date/Time     2021 09:31 AM    K 3.9 2021 09:31 AM    CL 94 2021 09:31 AM    CO2 25 2021 09:31 AM    BUN 14 2021 09:31 AM    CREATININE 0.90 2021 09:31 AM    GFRAA >60.0 2021 09:31 AM    LABGLOM >60.0 2021 09:31 AM    GLUCOSE 125 2021 09:31 AM    PROT 7.6 2021 09:31 AM    CALCIUM 9.5 2021 09:31 AM    BILITOT 1.1 2021 09:31 AM    ALKPHOS 90 12/09/2021 09:31 AM    AST 23 12/09/2021 09:31 AM    ALT 14 12/09/2021 09:31 AM       POC Tests: No results for input(s): POCGLU, POCNA, POCK, POCCL, POCBUN, POCHEMO, POCHCT in the last 72 hours. Coags:   Lab Results   Component Value Date/Time    PROTIME 13.7 12/09/2021 09:31 AM    INR 1.1 12/09/2021 09:31 AM       HCG (If Applicable): No results found for: PREGTESTUR, PREGSERUM, HCG, HCGQUANT     ABGs: No results found for: PHART, PO2ART, WQO9VHZ, JVU3FCC, BEART, N0ZXGQVA     Type & Screen (If Applicable):  No results found for: LABABO, LABRH    Drug/Infectious Status (If Applicable):  No results found for: HIV, HEPCAB    COVID-19 Screening (If Applicable): No results found for: COVID19        Anesthesia Evaluation  Patient summary reviewed and Nursing notes reviewed no history of anesthetic complications:   Airway: Mallampati: II  TM distance: >3 FB   Neck ROM: full  Mouth opening: > = 3 FB   Dental: normal exam         Pulmonary:Negative Pulmonary ROS and normal exam    (+) asthma:                            Cardiovascular:Negative CV ROS  Exercise tolerance: good (>4 METS),   (+) hypertension:,       ECG reviewed               Beta Blocker:  Not on Beta Blocker         Neuro/Psych:   Negative Neuro/Psych ROS  (+) neuromuscular disease:,             GI/Hepatic/Renal: Neg GI/Hepatic/Renal ROS            Endo/Other: Negative Endo/Other ROS   (+) hypothyroidism::., .          Pt had PAT visit. Abdominal:             Vascular: negative vascular ROS. Other Findings:           Anesthesia Plan      general and regional     ASA 2     (ETT)  Induction: intravenous. MIPS: Postoperative opioids intended and Prophylactic antiemetics administered. Anesthetic plan and risks discussed with patient. Plan discussed with CRNA.     Attending anesthesiologist reviewed and agrees with Pre Eval content                Jennifer Hardin MD   9/15/2022

## 2022-09-16 ENCOUNTER — TELEPHONE (OUTPATIENT)
Dept: SURGERY | Age: 57
End: 2022-09-16

## 2022-09-16 DIAGNOSIS — K43.9 VENTRAL HERNIA WITHOUT OBSTRUCTION OR GANGRENE: Primary | ICD-10-CM

## 2022-09-16 RX ORDER — IBUPROFEN 800 MG/1
800 TABLET ORAL 2 TIMES DAILY PRN
Qty: 20 TABLET | Refills: 1 | Status: SHIPPED | OUTPATIENT
Start: 2022-09-16

## 2022-09-16 NOTE — TELEPHONE ENCOUNTER
Pt called and wanted to know if you can call her in some ibuprofen (ADVIL;MOTRIN) 800 MG tablet    Since it helps the pain better. Pt said that the over the counter stuff doesn't work as good for the pain.

## 2022-09-17 DIAGNOSIS — J45.30 MILD PERSISTENT ASTHMA WITHOUT COMPLICATION: ICD-10-CM

## 2022-09-18 RX ORDER — MONTELUKAST SODIUM 10 MG/1
10 TABLET ORAL NIGHTLY
Qty: 90 TABLET | Refills: 3 | Status: SHIPPED | OUTPATIENT
Start: 2022-09-18

## 2022-09-19 NOTE — PROGRESS NOTES
[address] [facility] [address]    [address], [address] [facility]  Ph: [phone]   Fax: [phone]      Date: 2022    To: Kiara Kingsley PA-C From: Karen Read, OTR/L   Patient: Rylee Garcia. Bivins : 1965   MRN: [id number] Diagnosis: Stiffness of left hand, not elsewhere classified [M25.642] Diagnosis: L thumb stiffness     Date of eval: 2022    Visit Information:   Onset Date: 22  OT Insurance Information: Anahi Archer  Total # of Visits Approved: 8  Total # of Visits to Date: 5  Canceled Appointment: 5  Progress Note Counter: 5                              Assessment:      Long Term Goals Current/Discharge status  Met   Long Term Goal 1: Patient will report pain 2 or less during functional activities Pt. has occasional pain in thumb IP joint. [] Met  [x] Partially Met  [] Not Met   Long Term Goal 2: Patient  will be indep with all recommended HEP's, adaptive strategies, and adaptive techniques. Pt. has AROM, digit blocking and red theraputty HEP. [x] Met  [] Partially Met  [] Not Met   Long Term Goal 3: Patient will increase AROM of L thumb from current by 10-13° to increase performance with I/ADL's. Pt.'s AROM is [place] for L thumb. [] Met  [x] Partially Met  [] Not Met   Long Term Goal 4: Patient  will increase L  strength from current by 10-15 lbs to increase performance with I/ADL's. See chart below [] Met  [x] Partially Met  [] Not Met   Long Term Goal 5: Patient  will increase L pinch strength from current by 3-5 lbs to increase performance with I/ADL's. See chart below [x] Met  [] Partially Met  [] Not Met   Long Term Goal 6: Patient  will increase dexterity in L hand as observed by 9 hole peg test by decreasing time from current  by 1-2 seconds to increase performance with I/ADL's See chart below [] Met  [x] Partially Met  [] Not Met   Long Term Goal 7: Pt. will be able to utilize  handle during bike riding without pain.  NT [] Met  [] Partially Met  [x] Not Met     Average of  Three tries     RIGHT              CURRENT      Right                 Eval      Right              Norm   LEFT  CURRENT      LEFT        Eval     Left     Norm       Mamadou lb. 68 70 51 60 47 46   PALMAR  Pinch  Lb. 15 15 11 11 4 10   LATERAL  Pinch  Lb. 14 18 12 11 8 11     Pain in L thumb with pinch tasks,4-5/10. COORDINATION/DEXTERITY                                                                                          R                                                          L    CURRENT    EVAL   NORMS  CURRENT     EVAL    NORMS    9 HOLE PEG TEST    (seconds)   16.47 19.3 17.8 19.15 20.2 19.4     Functional assessment used: Upper Extremity Functional Index  Score on eval: 57/80=71.25%  Score at RE-CHECK 9/14/22: 68/80=85%    Plan: Pt. had poor compliance, however physician recommended pt. complete therapy program.  Pt. returned 9/14/22 for re-eval, however called to request D/C due to recent surgery and high cost of co-pays. Pt. did not complete outpatient therapy program.  D/C from OT per pt. request.    Thank you for referral of this patient. Electronically signed by:   NARAYAN Mcguire 9/19/2022 8:34 AM

## 2022-09-20 ENCOUNTER — HOSPITAL ENCOUNTER (OUTPATIENT)
Dept: OCCUPATIONAL THERAPY | Age: 57
Setting detail: THERAPIES SERIES
End: 2022-09-20
Payer: COMMERCIAL

## 2022-09-22 ENCOUNTER — APPOINTMENT (OUTPATIENT)
Dept: OCCUPATIONAL THERAPY | Age: 57
End: 2022-09-22
Payer: COMMERCIAL

## 2022-09-23 ENCOUNTER — OFFICE VISIT (OUTPATIENT)
Dept: SURGERY | Age: 57
End: 2022-09-23

## 2022-09-23 VITALS
HEART RATE: 62 BPM | TEMPERATURE: 97.6 F | OXYGEN SATURATION: 98 % | HEIGHT: 68 IN | BODY MASS INDEX: 24.86 KG/M2 | WEIGHT: 164 LBS

## 2022-09-23 DIAGNOSIS — K43.9 VENTRAL HERNIA WITHOUT OBSTRUCTION OR GANGRENE: Primary | ICD-10-CM

## 2022-09-23 PROCEDURE — 99024 POSTOP FOLLOW-UP VISIT: CPT | Performed by: COLON & RECTAL SURGERY

## 2022-09-23 NOTE — PROGRESS NOTES
AffectSubjective:      Patient ID: Leonid Baez is a 56 y.o. female who presents for:  Chief Complaint   Patient presents with    Post-Op Check       This is a 37-year-old female who returns from a ventral hernia repair with mesh. She is doing well. Minimal discomfort. Eating well. Denies fever.       Past Medical History:   Diagnosis Date    Allergic rhinitis     Allergies     Asthma     Closed dislocation of finger of left hand 2018    Dermoid tumor     Essential hypertension 5/15/2018    Hyperthyroidism     Thyroid disease      Past Surgical History:   Procedure Laterality Date    LAPAROSCOPY Right 10/20/2021    LAPARSCOPIC ADNEXAL MASS REMOVAL WITH  RIGHT SALPINGO OOPHORECTOMY; INTRAUTERINE DEVICE REMOVAL performed by Ayesha Glez MD at [address] [place] [address] Bilateral 2021    MANDIBLE SURGERY      OVARY REMOVAL Right 10/2021    due to cyst    VENTRAL HERNIA REPAIR N/A 9/15/2022    VENTRAL HERNIA REPAIR WITH MESH performed by Chandana Hameed MD at [address] [facility] History     Socioeconomic History    Marital status:      Spouse name: Carolina Bloch    Number of children: 0    Years of education: 23    Highest education level: Professional school degree (e.g., MD, DDS, DVM, VIVIENNE)   Occupational History    Occupation: retired   Tobacco Use    Smoking status: Former     Packs/day: 0.25     Years: 5.00     Pack years: 1.25     Types: Cigarettes     Quit date: 1990     Years since quittin.2    Smokeless tobacco: Never   Vaping Use    Vaping Use: Never used   Substance and Sexual Activity    Alcohol use: Not Currently    Drug use: Not Currently    Sexual activity: Yes     Partners: Male     Comment:  and monogamous   Other Topics Concern    Not on file   Social History Narrative    Not on file     Social Determinants of Health     Financial Resource Strain: Low Risk     Difficulty of Paying Living Expenses: Not hard at all   Food Insecurity: No Food Insecurity    Worried About Running Out of Food in the Last Year: Never true    Ran Out of Food in the Last Year: Never true   Transportation Needs: No Transportation Needs    Lack of Transportation (Medical): No    Lack of Transportation (Non-Medical): No   Physical Activity: Not on file   Stress: Not on file   Social Connections: Not on file   Intimate Partner Violence: Not on file   Housing Stability: Not on file     Family History   Problem Relation Age of Onset    Anemia Mother     Asthma Mother     Obesity Mother     Alcohol Abuse Neg Hx     Allergy (Severe) Neg Hx     Arthritis Neg Hx     Arrhythmia Neg Hx     Atrial Fibrillation Neg Hx     Birth Defects Neg Hx     Breast Cancer Neg Hx     Cancer Neg Hx     Coronary Art Dis Neg Hx     Colon Cancer Neg Hx     Depression Neg Hx     Diabetes Neg Hx     Early Death Neg Hx     Hearing Loss Neg Hx     Heart Attack Neg Hx     Heart Disease Neg Hx     High Blood Pressure Neg Hx     High Cholesterol Neg Hx     Kidney Disease Neg Hx     Learning Disabilities Neg Hx     Mental Illness Neg Hx     Mental Retardation Neg Hx     Miscarriages / Stillbirths Neg Hx     Osteoporosis Neg Hx     Prostate Cancer Neg Hx     Stroke Neg Hx     Substance Abuse Neg Hx     Vision Loss Neg Hx      Allergies:  Patient has no known allergies. Review of Systems    Objective:    Pulse 62   Temp 97.6 °F (36.4 °C) (Temporal)   Ht 5' 8\" (1.727 m)   Wt 164 lb (74.4 kg)   LMP  (LMP Unknown)   SpO2 98%   BMI 24.94 kg/m²     Physical Exam  On exam her incision looks fine. Minimal bruising. No evidence of infection or signs of recurrence. Assessment/Plan:          Diagnosis Orders   1. Ventral hernia without obstruction or gangrene          Wound care and activity instructions were given    She will return back to see me in the office as needed. If there are any problems or questions that arise, I asked her to call.             Please note this report has beenpartially produced using speech recognition software and may cause contain errors related to that system including grammar, punctuation and spelling as well as words and phrases that may seem inappropriate.  If there arequestions or concerns please feel free to contact me to clarify

## 2022-11-30 DIAGNOSIS — J45.30 MILD PERSISTENT ASTHMA WITHOUT COMPLICATION: ICD-10-CM

## 2022-11-30 RX ORDER — ALBUTEROL SULFATE 90 UG/1
2 AEROSOL, METERED RESPIRATORY (INHALATION) EVERY 4 HOURS PRN
Qty: 1 EACH | Refills: 5 | Status: SHIPPED | OUTPATIENT
Start: 2022-11-30

## 2022-12-05 ENCOUNTER — OFFICE VISIT (OUTPATIENT)
Dept: PRIMARY CARE CLINIC | Age: 57
End: 2022-12-05
Payer: COMMERCIAL

## 2022-12-05 VITALS
HEART RATE: 69 BPM | BODY MASS INDEX: 26.99 KG/M2 | TEMPERATURE: 97.9 F | DIASTOLIC BLOOD PRESSURE: 66 MMHG | SYSTOLIC BLOOD PRESSURE: 118 MMHG | WEIGHT: 178.1 LBS | RESPIRATION RATE: 18 BRPM | OXYGEN SATURATION: 100 % | HEIGHT: 68 IN

## 2022-12-05 DIAGNOSIS — B35.1 ONYCHOMYCOSIS: Primary | ICD-10-CM

## 2022-12-05 PROCEDURE — 3074F SYST BP LT 130 MM HG: CPT | Performed by: INTERNAL MEDICINE

## 2022-12-05 PROCEDURE — 3078F DIAST BP <80 MM HG: CPT | Performed by: INTERNAL MEDICINE

## 2022-12-05 PROCEDURE — 99213 OFFICE O/P EST LOW 20 MIN: CPT | Performed by: INTERNAL MEDICINE

## 2022-12-05 ASSESSMENT — ENCOUNTER SYMPTOMS
WHEEZING: 0
DIARRHEA: 0
VOMITING: 0
COUGH: 0
SHORTNESS OF BREATH: 0
NAUSEA: 0
ABDOMINAL PAIN: 0

## 2022-12-05 NOTE — PROGRESS NOTES
Subjective:      Patient ID: Miguel Moody. Beatrice Gold is a 56 y.o. female    Left toenail discoloration x many years   HPI  Pt presents with left toenail thickening for many years. No improvement with OTC medication. No pain or swelling.    Past Medical History:   Diagnosis Date    Allergic rhinitis     Allergies     Asthma     Closed dislocation of finger of left hand 2018    Dermoid tumor     Essential hypertension 5/15/2018    Hyperthyroidism     Thyroid disease      Past Surgical History:   Procedure Laterality Date    LAPAROSCOPY Right 10/20/2021    LAPARSCOPIC ADNEXAL MASS REMOVAL WITH  RIGHT SALPINGO OOPHORECTOMY; INTRAUTERINE DEVICE REMOVAL performed by Yasmin Willams MD at [address] Bilateral 2021    MANDIBLE SURGERY      OVARY REMOVAL Right 10/2021    due to cyst    VENTRAL HERNIA REPAIR N/A 9/15/2022    VENTRAL HERNIA REPAIR WITH MESH performed by Rancho Castillo MD at [address] [facility] History     Socioeconomic History    Marital status:      Spouse name: Nevin Alvarenga    Number of children: 0    Years of education: 23    Highest education level: Professional school degree (e.g., MD, DDS, DVM, VIVIENNE)   Occupational History    Occupation: retired   Tobacco Use    Smoking status: Former     Packs/day: 0.25     Years: 5.00     Pack years: 1.25     Types: Cigarettes     Quit date: 1990     Years since quittin.4    Smokeless tobacco: Never   Vaping Use    Vaping Use: Never used   Substance and Sexual Activity    Alcohol use: Not Currently    Drug use: Not Currently    Sexual activity: Yes     Partners: Male     Comment:  and monogamous   Other Topics Concern    Not on file   Social History Narrative    Not on file     Social Determinants of Health     Financial Resource Strain: Low Risk     Difficulty of Paying Living Expenses: Not hard at all   Food Insecurity: No Food Insecurity    Worried About Running Out of Food in the Last Year: Never true    [address] in the Last Year: Never true   Transportation Needs: No Transportation Needs    Lack of Transportation (Medical): No    Lack of Transportation (Non-Medical): No   Physical Activity: Not on file   Stress: Not on file   Social Connections: Not on file   Intimate Partner Violence: Not on file   Housing Stability: Not on file     Family History   Problem Relation Age of Onset    Anemia Mother     Asthma Mother     Obesity Mother     Alcohol Abuse Neg Hx     Allergy (Severe) Neg Hx     Arthritis Neg Hx     Arrhythmia Neg Hx     Atrial Fibrillation Neg Hx     Birth Defects Neg Hx     Breast Cancer Neg Hx     Cancer Neg Hx     Coronary Art Dis Neg Hx     Colon Cancer Neg Hx     Depression Neg Hx     Diabetes Neg Hx     Early Death Neg Hx     Hearing Loss Neg Hx     Heart Attack Neg Hx     Heart Disease Neg Hx     High Blood Pressure Neg Hx     High Cholesterol Neg Hx     Kidney Disease Neg Hx     Learning Disabilities Neg Hx     Mental Illness Neg Hx     Mental Retardation Neg Hx     Miscarriages / Stillbirths Neg Hx     Osteoporosis Neg Hx     Prostate Cancer Neg Hx     Stroke Neg Hx     Substance Abuse Neg Hx     Vision Loss Neg Hx      Allergies:  Patient has no known allergies.   Patient Active Problem List   Diagnosis    Acquired hypothyroidism    Allergic conjunctivitis of both eyes    Closed dislocation of finger of left hand    Diverticulosis of sigmoid colon    Essential hypertension    Mild persistent asthma without complication    Myopia of both eyes    Seasonal and perennial allergic rhinitis    Stiffness of finger joint, left    Pelvic mass    Dermoid cyst    Small fiber neuropathy    Distal paresthesia    Neuropathic pain    Ventral hernia without obstruction or gangrene     Current Outpatient Medications on File Prior to Visit   Medication Sig Dispense Refill    albuterol sulfate HFA (PROVENTIL;VENTOLIN;PROAIR) 108 (90 Base) MCG/ACT inhaler Inhale 2 puffs into the lungs every 4 hours as needed for Wheezing or Shortness of Breath 1 each 5    montelukast (SINGULAIR) 10 MG tablet Take 1 tablet by mouth nightly 90 tablet 3    ibuprofen (ADVIL;MOTRIN) 800 MG tablet Take 1 tablet by mouth 2 times daily as needed for Pain 20 tablet 1    thiamine 100 MG tablet Take 1 tablet by mouth daily 90 tablet 1    potassium chloride (KLOR-CON M20) 20 MEQ extended release tablet TAKE 1 TABLET BY MOUTH TWICE A WEEK 30 tablet 2    folic acid (FOLVITE) 1 MG tablet TAKE 1 TABLET BY MOUTH EVERY DAY 90 tablet 3    desloratadine (CLARINEX) 5 MG tablet TAKE 1 TABLET BY MOUTH EVERY DAY 90 tablet 3    mometasone (ELOCON) 0.1 % cream APPLY TO AFFECTED AREA TOPICALLY EVERY DAY 15 g 2    ibuprofen (ADVIL;MOTRIN) 800 MG tablet       triazolam (HALCION) 0.25 MG tablet       clotrimazole-betamethasone (LOTRISONE) 1-0.05 % cream Apply topically 2 times daily. 1 each 0    levothyroxine (SYNTHROID) 75 MCG tablet Take 1 tablet by mouth Daily 90 tablet 3    hydroCHLOROthiazide (HYDRODIURIL) 25 MG tablet Take 1 tablet by mouth every morning 90 tablet 3    magnesium oxide (MAG-OX) 400 MG tablet TAKE 1 TABLET BY MOUTH EVERY DAY 90 tablet 3    conjugated estrogens (PREMARIN) 0.625 MG/GM vaginal cream Use 0.5 g pv 2 to 3 times weekly. 3 each 3    WIXELA INHUB 100-50 MCG/DOSE diskus inhaler INHALE 1 PUFF AS INSTRUCTED TWICE DAILY. RINSE AND GARGLE MOUTH WITH WATER AFTER EACH USE 60 each 5    albuterol (PROVENTIL) (2.5 MG/3ML) 0.083% nebulizer solution Inhale by nebulizer over 5-15 minutes three (3) to four (4) times a day as needed for cough/wheezing/shortness of breath 120 each 5    gabapentin (NEURONTIN) 100 MG capsule Take 2 capsules by mouth 3 times daily for 90 days. Intended supply: 90 days 540 capsule 3     No current facility-administered medications on file prior to visit. Review of Systems   Constitutional:  Negative for chills and diaphoresis. Respiratory:  Negative for cough, shortness of breath and wheezing. Cardiovascular:  Negative for chest pain. Gastrointestinal:  Negative for abdominal pain, diarrhea, nausea and vomiting. Endocrine: Negative for cold intolerance and heat intolerance. Genitourinary:  Negative for dysuria and frequency. Neurological:  Negative for dizziness and light-headedness. Objective:   /66   Pulse 69   Temp 97.9 °F (36.6 °C)   Resp 18   Ht 5' 8\" (1.727 m)   Wt 178 lb 1.6 oz (80.8 kg)   LMP  (LMP Unknown)   SpO2 100%   BMI 27.08 kg/m²     Physical Exam  Constitutional:       General: She is not in acute distress. Cardiovascular:      Rate and Rhythm: Normal rate and regular rhythm. Heart sounds: Normal heart sounds, S1 normal and S2 normal.   Pulmonary:      Effort: Pulmonary effort is normal. No respiratory distress. Breath sounds: Normal breath sounds. Abdominal:      General: Bowel sounds are normal.      Tenderness: There is no abdominal tenderness. Musculoskeletal:      Comments:   No foot or ankle erythema, swelling  Left second toenail hypertrophy   Neurological:      Mental Status: She is alert. Assessment:       Diagnosis Orders   1. Onychomycosis  ciclopirox (PENLAC) 8 % solution        Plan:      No orders of the defined types were placed in this encounter. Orders Placed This Encounter   Medications    ciclopirox (PENLAC) 8 % solution     Sig: Apply topically nightly. Dispense:  6 mL     Refill:  1     No follow-ups on file.

## 2022-12-10 DIAGNOSIS — I10 ESSENTIAL HYPERTENSION: ICD-10-CM

## 2022-12-12 RX ORDER — HYDROCHLOROTHIAZIDE 25 MG/1
TABLET ORAL
Qty: 90 TABLET | Refills: 3 | Status: SHIPPED | OUTPATIENT
Start: 2022-12-12

## 2022-12-18 DIAGNOSIS — E03.9 ACQUIRED HYPOTHYROIDISM: ICD-10-CM

## 2022-12-21 RX ORDER — LEVOTHYROXINE SODIUM 0.07 MG/1
TABLET ORAL
Qty: 90 TABLET | Refills: 3 | Status: SHIPPED | OUTPATIENT
Start: 2022-12-21

## 2023-01-06 ENCOUNTER — TELEPHONE (OUTPATIENT)
Dept: PRIMARY CARE CLINIC | Age: 58
End: 2023-01-06

## 2023-01-06 NOTE — TELEPHONE ENCOUNTER
PA denied for Desloratadine 5 mg tabs. Coverage is provided when the member has had a 30-day trial of ONE second generation antihistamine medication, such as Loratadine, Cetirizine or Fexofenadine. Coverage may also be provided when there is a medical reason why these med's cannot be used.

## 2023-01-07 DIAGNOSIS — J30.2 SEASONAL ALLERGIES: Primary | ICD-10-CM

## 2023-01-07 RX ORDER — LORATADINE 10 MG/1
10 TABLET ORAL DAILY
Qty: 60 TABLET | Refills: 3 | Status: SHIPPED | OUTPATIENT
Start: 2023-01-07 | End: 2023-02-06

## 2023-01-11 PROBLEM — R19.00 PELVIC MASS: Status: RESOLVED | Noted: 2019-12-18 | Resolved: 2023-01-11

## 2023-01-11 PROBLEM — I10 ESSENTIAL HYPERTENSION: Status: RESOLVED | Noted: 2018-05-15 | Resolved: 2023-01-11

## 2023-01-13 ENCOUNTER — OFFICE VISIT (OUTPATIENT)
Dept: NEUROLOGY | Age: 58
End: 2023-01-13
Payer: COMMERCIAL

## 2023-01-13 VITALS
BODY MASS INDEX: 25.54 KG/M2 | SYSTOLIC BLOOD PRESSURE: 122 MMHG | WEIGHT: 168 LBS | HEART RATE: 80 BPM | DIASTOLIC BLOOD PRESSURE: 78 MMHG

## 2023-01-13 DIAGNOSIS — R20.2 DISTAL PARESTHESIA: ICD-10-CM

## 2023-01-13 DIAGNOSIS — G62.9 SMALL FIBER NEUROPATHY: Primary | ICD-10-CM

## 2023-01-13 PROCEDURE — 99213 OFFICE O/P EST LOW 20 MIN: CPT | Performed by: PSYCHIATRY & NEUROLOGY

## 2023-01-13 ASSESSMENT — ENCOUNTER SYMPTOMS
CHOKING: 0
SHORTNESS OF BREATH: 0
TROUBLE SWALLOWING: 0
PHOTOPHOBIA: 0
COLOR CHANGE: 0
VOMITING: 0
NAUSEA: 0
BACK PAIN: 0

## 2023-01-13 NOTE — PROGRESS NOTES
Subjective:      Patient ID: Priscila Simmons. Vikram Ferrera is a 56 y.o. female who presents today for:  Chief Complaint   Patient presents with    Follow-up     Pt states that she was on a water pill from PCP and since she has stopped taking that her neuropathy is doing better. She does have a stiff joint in her big toe on the left foot but thinks it is from moving furniture around. HPI 56 right-handed female with history of small fiber neuropathy. This likely represents a neuropathy from toxins. Patient is doing well on gabapentin with the combination of alpha lipoic acid. She had some leg swelling and was on hydrochlorothiazide though truly this may have caused some degree of dehydration.   She is doing better with the distal paresthesias    Past Medical History:   Diagnosis Date    Allergic rhinitis     Allergies     Asthma     Closed dislocation of finger of left hand 2018    Dermoid tumor     Essential hypertension 5/15/2018    Hyperthyroidism     Thyroid disease      Past Surgical History:   Procedure Laterality Date    LAPAROSCOPY Right 10/20/2021    LAPARSCOPIC ADNEXAL MASS REMOVAL WITH  RIGHT SALPINGO OOPHORECTOMY; INTRAUTERINE DEVICE REMOVAL performed by Keenan Marshall MD at [address] Bilateral 2021    MANDIBLE SURGERY      OVARY REMOVAL Right 10/2021    due to cyst    VENTRAL HERNIA REPAIR N/A 9/15/2022    VENTRAL HERNIA REPAIR WITH MESH performed by Deisy Woods MD at [address] [facility] History     Socioeconomic History    Marital status:      Spouse name: Anais Morrell    Number of children: 0    Years of education: 23    Highest education level: Professional school degree (e.g., MD, DDS, DVM, VIVIENNE)   Occupational History    Occupation: retired   Tobacco Use    Smoking status: Former     Packs/day: 0.25     Years: 5.00     Pack years: 1.25     Types: Cigarettes     Quit date: 1990     Years since quittin.5    Smokeless tobacco: Never   Vaping Use    Vaping Use: Never used   Substance and Sexual Activity    Alcohol use: Not Currently    Drug use: Not Currently    Sexual activity: Yes     Partners: Male     Comment:  and monogamous   Other Topics Concern    Not on file   Social History Narrative    Not on file     Social Determinants of Health     Financial Resource Strain: Low Risk     Difficulty of Paying Living Expenses: Not hard at all   Food Insecurity: No Food Insecurity    Worried About Running Out of Food in the Last Year: Never true    Ran Out of Food in the Last Year: Never true   Transportation Needs: No Transportation Needs    Lack of Transportation (Medical): No    Lack of Transportation (Non-Medical): No   Physical Activity: Not on file   Stress: Not on file   Social Connections: Not on file   Intimate Partner Violence: Not on file   Housing Stability: Not on file     Family History   Problem Relation Age of Onset    Anemia Mother     Asthma Mother     Obesity Mother     Alcohol Abuse Neg Hx     Allergy (Severe) Neg Hx     Arthritis Neg Hx     Arrhythmia Neg Hx     Atrial Fibrillation Neg Hx     Birth Defects Neg Hx     Breast Cancer Neg Hx     Cancer Neg Hx     Coronary Art Dis Neg Hx     Colon Cancer Neg Hx     Depression Neg Hx     Diabetes Neg Hx     Early Death Neg Hx     Hearing Loss Neg Hx     Heart Attack Neg Hx     Heart Disease Neg Hx     High Blood Pressure Neg Hx     High Cholesterol Neg Hx     Kidney Disease Neg Hx     Learning Disabilities Neg Hx     Mental Illness Neg Hx     Mental Retardation Neg Hx     Miscarriages / Stillbirths Neg Hx     Osteoporosis Neg Hx     Prostate Cancer Neg Hx     Stroke Neg Hx     Substance Abuse Neg Hx     Vision Loss Neg Hx      No Known Allergies    Current Outpatient Medications   Medication Sig Dispense Refill    loratadine (CLARITIN) 10 MG tablet Take 1 tablet by mouth daily 60 tablet 3    levothyroxine (SYNTHROID) 75 MCG tablet TAKE 1 TABLET BY MOUTH EVERY DAY 90 tablet 3    ciclopirox (PENLAC) 8 % solution Apply  topically nightly. 6 mL 1    albuterol sulfate HFA (PROVENTIL;VENTOLIN;PROAIR) 108 (90 Base) MCG/ACT inhaler Inhale 2 puffs into the lungs every 4 hours as needed for Wheezing or Shortness of Breath 1 each 5    montelukast (SINGULAIR) 10 MG tablet Take 1 tablet by mouth nightly 90 tablet 3    thiamine 100 MG tablet Take 1 tablet by mouth daily 90 tablet 1    potassium chloride (KLOR-CON M20) 20 MEQ extended release tablet TAKE 1 TABLET BY MOUTH TWICE A WEEK 30 tablet 2    folic acid (FOLVITE) 1 MG tablet TAKE 1 TABLET BY MOUTH EVERY DAY 90 tablet 3    mometasone (ELOCON) 0.1 % cream APPLY TO AFFECTED AREA TOPICALLY EVERY DAY 15 g 2    gabapentin (NEURONTIN) 100 MG capsule Take 2 capsules by mouth 3 times daily for 90 days. Intended supply: 90 days 540 capsule 3    clotrimazole-betamethasone (LOTRISONE) 1-0.05 % cream Apply topically 2 times daily. 1 each 0    magnesium oxide (MAG-OX) 400 MG tablet TAKE 1 TABLET BY MOUTH EVERY DAY 90 tablet 3    conjugated estrogens (PREMARIN) 0.625 MG/GM vaginal cream Use 0.5 g pv 2 to 3 times weekly. 3 each 3    WIXELA INHUB 100-50 MCG/DOSE diskus inhaler INHALE 1 PUFF AS INSTRUCTED TWICE DAILY. RINSE AND GARGLE MOUTH WITH WATER AFTER EACH USE 60 each 5    albuterol (PROVENTIL) (2.5 MG/3ML) 0.083% nebulizer solution Inhale by nebulizer over 5-15 minutes three (3) to four (4) times a day as needed for cough/wheezing/shortness of breath 120 each 5    hydroCHLOROthiazide (HYDRODIURIL) 25 MG tablet TAKE 1 TABLET BY MOUTH EVERY DAY IN THE MORNING (Patient not taking: Reported on 1/13/2023) 90 tablet 3     No current facility-administered medications for this visit. Review of Systems   Constitutional:  Negative for fever. HENT:  Negative for ear pain, tinnitus and trouble swallowing. Eyes:  Negative for photophobia and visual disturbance. Respiratory:  Negative for choking and shortness of breath. Cardiovascular:  Negative for chest pain and palpitations. Gastrointestinal:  Negative for nausea and vomiting. Musculoskeletal:  Negative for back pain, gait problem, joint swelling, myalgias, neck pain and neck stiffness. Skin:  Negative for color change. Allergic/Immunologic: Negative for food allergies. Neurological:  Negative for dizziness, tremors, seizures, syncope, facial asymmetry, speech difficulty, weakness, light-headedness, numbness and headaches. Psychiatric/Behavioral:  Negative for behavioral problems, confusion, hallucinations and sleep disturbance. Objective:   /78 (Site: Left Upper Arm, Position: Sitting, Cuff Size: Medium Adult)   Pulse 80   Wt 168 lb (76.2 kg)   LMP  (LMP Unknown)   BMI 25.54 kg/m²     Physical Exam  Vitals reviewed. Eyes:      Pupils: Pupils are equal, round, and reactive to light. Cardiovascular:      Rate and Rhythm: Normal rate and regular rhythm. Heart sounds: No murmur heard. Pulmonary:      Effort: Pulmonary effort is normal.      Breath sounds: Normal breath sounds. Abdominal:      General: Bowel sounds are normal.   Musculoskeletal:         General: Normal range of motion. Cervical back: Normal range of motion. Skin:     General: Skin is warm. Neurological:      Mental Status: She is alert and oriented to person, place, and time. Cranial Nerves: No cranial nerve deficit. Sensory: No sensory deficit. Motor: No abnormal muscle tone. Coordination: Coordination normal.      Deep Tendon Reflexes: Reflexes are normal and symmetric. Babinski sign absent on the right side. Babinski sign absent on the left side. Psychiatric:         Mood and Affect: Mood normal.       No results found.     Lab Results   Component Value Date/Time    WBC 4.5 09/15/2022 08:07 AM    RBC 4.30 09/15/2022 08:07 AM    HGB 12.4 09/15/2022 08:07 AM    HCT 36.3 09/15/2022 08:07 AM    MCV 84.5 09/15/2022 08:07 AM    MCH 28.8 09/15/2022 08:07 AM    MCHC 34.1 09/15/2022 08:07 AM    RDW 14.7 09/15/2022 08:07 AM     09/15/2022 08:07 AM     Lab Results   Component Value Date/Time     09/15/2022 08:07 AM    K 3.7 09/15/2022 08:07 AM    CL 99 09/15/2022 08:07 AM    CO2 25 09/15/2022 08:07 AM    BUN 25 09/15/2022 08:07 AM    CREATININE 0.79 09/15/2022 08:07 AM    GFRAA >60.0 09/15/2022 08:07 AM    LABGLOM >60.0 09/15/2022 08:07 AM    GLUCOSE 108 09/15/2022 08:07 AM    PROT 7.6 12/09/2021 09:31 AM    LABALBU 4.4 12/09/2021 09:31 AM    CALCIUM 9.3 09/15/2022 08:07 AM    BILITOT 1.1 12/09/2021 09:31 AM    ALKPHOS 90 12/09/2021 09:31 AM    AST 23 12/09/2021 09:31 AM    ALT 14 12/09/2021 09:31 AM     Lab Results   Component Value Date/Time    PROTIME 13.7 12/09/2021 09:31 AM    INR 1.1 12/09/2021 09:31 AM     Lab Results   Component Value Date/Time    TSH 2.910 02/26/2020 01:18 PM    LEADTKYG32 615 12/09/2021 09:31 AM    FOLATE >20.0 12/09/2021 09:31 AM    FERRITIN 182.2 08/13/2021 08:24 AM    IRON 98 08/13/2021 08:24 AM    TIBC 330 08/13/2021 08:24 AM     Lab Results   Component Value Date/Time    TRIG 117 12/15/2021 09:43 AM    HDL 50 12/15/2021 09:43 AM    LDLCALC 99 12/15/2021 09:43 AM     No results found for: Beryle Montemayor, LABBENZ, CANNAB, COCAINESCRN, LABMETH, OPIATESCREENURINE, PHENCYCLIDINESCREENURINE, PPXUR, ETOH  No results found for: LITHIUM, DILFRTOT, VALPROATE    Assessment:       Diagnosis Orders   1. Small fiber neuropathy        2. Distal paresthesia        Small fiber neuropathy responsive to gabapentin. She takes 20 mg times a day and is adequate. We also had added alpha lipoic acid which is helping. Overall she is doing well. She had some leg swelling and she does have hydrochlorothiazide I recommend that she use it as needed as and when required not on a continuous basis as this may cause some dehydration. She will keep an eye on this. She would like me to take over her gabapentin refills which is reasonable. We will follow in a few months    Damien Nance MD, Candy Wong, American Board of Psychiatry & Neurology  Board Certified in Vascular Neurology  Board Certified in Neuromuscular Medicine  Certified in [place] [address] Neurology        Plan:      No orders of the defined types were placed in this encounter. No orders of the defined types were placed in this encounter. Return in about 6 months (around 7/13/2023).       Mery Proctor MD

## 2023-01-17 RX ORDER — LANOLIN ALCOHOL/MO/W.PET/CERES
CREAM (GRAM) TOPICAL
Qty: 90 TABLET | Refills: 1 | Status: SHIPPED | OUTPATIENT
Start: 2023-01-17

## 2023-01-17 NOTE — TELEPHONE ENCOUNTER
Pharmacy is requesting medication refill.  Please approve or deny this request.    Rx requested:  Requested Prescriptions     Pending Prescriptions Disp Refills    thiamine 100 MG tablet [Pharmacy Med Name: VITAMIN B-1 100 MG TABLET] 90 tablet 1     Sig: TAKE 1 TABLET BY MOUTH EVERY DAY         Last Office Visit:   1/13/2023      Next Visit Date:  Future Appointments   Date Time Provider Faith Alejandre   3/7/2023  9:30 [facility]   7/7/2023  9:15 AM Jewels Benson  [facility] -

## 2023-01-20 ENCOUNTER — OFFICE VISIT (OUTPATIENT)
Dept: PRIMARY CARE CLINIC | Age: 58
End: 2023-01-20

## 2023-01-20 ENCOUNTER — TELEPHONE (OUTPATIENT)
Dept: PRIMARY CARE CLINIC | Age: 58
End: 2023-01-20

## 2023-01-20 VITALS
BODY MASS INDEX: 25.67 KG/M2 | HEART RATE: 69 BPM | RESPIRATION RATE: 18 BRPM | WEIGHT: 169.4 LBS | HEIGHT: 68 IN | DIASTOLIC BLOOD PRESSURE: 70 MMHG | OXYGEN SATURATION: 98 % | TEMPERATURE: 97.3 F | SYSTOLIC BLOOD PRESSURE: 128 MMHG

## 2023-01-20 DIAGNOSIS — R35.0 FREQUENT URINATION: ICD-10-CM

## 2023-01-20 DIAGNOSIS — R35.0 FREQUENT URINATION: Primary | ICD-10-CM

## 2023-01-20 LAB
BACTERIA: NEGATIVE /HPF
BILIRUBIN URINE: NEGATIVE
BILIRUBIN, POC: ABNORMAL
BLOOD URINE, POC: ABNORMAL
BLOOD, URINE: ABNORMAL
CLARITY, POC: ABNORMAL
CLARITY: CLEAR
COLOR, POC: YELLOW
COLOR: YELLOW
EPITHELIAL CELLS, UA: ABNORMAL /HPF (ref 0–5)
GLUCOSE URINE, POC: ABNORMAL
GLUCOSE URINE: NEGATIVE MG/DL
HYALINE CASTS: ABNORMAL /HPF (ref 0–5)
KETONES, POC: ABNORMAL
KETONES, URINE: NEGATIVE MG/DL
LEUKOCYTE EST, POC: ABNORMAL
LEUKOCYTE ESTERASE, URINE: ABNORMAL
NITRITE, POC: ABNORMAL
NITRITE, URINE: NEGATIVE
PH UA: 7.5 (ref 5–9)
PH, POC: 7
PROTEIN UA: NEGATIVE MG/DL
PROTEIN, POC: ABNORMAL
RBC UA: ABNORMAL /HPF (ref 0–5)
SPECIFIC GRAVITY UA: 1 (ref 1–1.03)
SPECIFIC GRAVITY, POC: 1.01
UROBILINOGEN, POC: ABNORMAL
UROBILINOGEN, URINE: 0.2 E.U./DL
WBC UA: ABNORMAL /HPF (ref 0–5)

## 2023-01-20 ASSESSMENT — PATIENT HEALTH QUESTIONNAIRE - PHQ9
4. FEELING TIRED OR HAVING LITTLE ENERGY: 0
8. MOVING OR SPEAKING SO SLOWLY THAT OTHER PEOPLE COULD HAVE NOTICED. OR THE OPPOSITE, BEING SO FIGETY OR RESTLESS THAT YOU HAVE BEEN MOVING AROUND A LOT MORE THAN USUAL: 0
7. TROUBLE CONCENTRATING ON THINGS, SUCH AS READING THE NEWSPAPER OR WATCHING TELEVISION: 0
5. POOR APPETITE OR OVEREATING: 0
1. LITTLE INTEREST OR PLEASURE IN DOING THINGS: 0
10. IF YOU CHECKED OFF ANY PROBLEMS, HOW DIFFICULT HAVE THESE PROBLEMS MADE IT FOR YOU TO DO YOUR WORK, TAKE CARE OF THINGS AT HOME, OR GET ALONG WITH OTHER PEOPLE: 0
9. THOUGHTS THAT YOU WOULD BE BETTER OFF DEAD, OR OF HURTING YOURSELF: 0
2. FEELING DOWN, DEPRESSED OR HOPELESS: 0
SUM OF ALL RESPONSES TO PHQ QUESTIONS 1-9: 0
6. FEELING BAD ABOUT YOURSELF - OR THAT YOU ARE A FAILURE OR HAVE LET YOURSELF OR YOUR FAMILY DOWN: 0
SUM OF ALL RESPONSES TO PHQ QUESTIONS 1-9: 0
3. TROUBLE FALLING OR STAYING ASLEEP: 0
SUM OF ALL RESPONSES TO PHQ9 QUESTIONS 1 & 2: 0
SUM OF ALL RESPONSES TO PHQ QUESTIONS 1-9: 0
SUM OF ALL RESPONSES TO PHQ QUESTIONS 1-9: 0

## 2023-01-20 ASSESSMENT — COLUMBIA-SUICIDE SEVERITY RATING SCALE - C-SSRS
6. HAVE YOU EVER DONE ANYTHING, STARTED TO DO ANYTHING, OR PREPARED TO DO ANYTHING TO END YOUR LIFE?: NO
1. WITHIN THE PAST MONTH, HAVE YOU WISHED YOU WERE DEAD OR WISHED YOU COULD GO TO SLEEP AND NOT WAKE UP?: NO
2. HAVE YOU ACTUALLY HAD ANY THOUGHTS OF KILLING YOURSELF?: NO

## 2023-01-20 NOTE — PROGRESS NOTES
Subjective:      Patient ID: Rodney Ric. Scottie Navarro is a 56 y.o. female    Frequency of urination   HPI  Pt presents with 1 day burning frequent. Attests to urgency of urination.     Past Medical History:   Diagnosis Date    Allergic rhinitis     Allergies     Asthma     Closed dislocation of finger of left hand 2018    Dermoid tumor     Essential hypertension 5/15/2018    Hyperthyroidism     Thyroid disease      Past Surgical History:   Procedure Laterality Date    LAPAROSCOPY Right 10/20/2021    LAPARSCOPIC ADNEXAL MASS REMOVAL WITH  RIGHT SALPINGO OOPHORECTOMY; INTRAUTERINE DEVICE REMOVAL performed by David Dial MD at [address] [facility] [address] Bilateral 2021    MANDIBLE SURGERY      OVARY REMOVAL Right 10/2021    due to cyst    VENTRAL HERNIA REPAIR N/A 9/15/2022    VENTRAL HERNIA REPAIR WITH MESH performed by Kasey Joseph MD at [address] [place] [address] History     Socioeconomic History    Marital status:      Spouse name: Carroll Michael    Number of children: 0    Years of education: 23    Highest education level: Professional school degree (e.g., MD, DDS, DVM, VIVIENNE)   Occupational History    Occupation: retired   Tobacco Use    Smoking status: Former     Packs/day: 0.25     Years: 5.00     Pack years: 1.25     Types: Cigarettes     Quit date: 1990     Years since quittin.5    Smokeless tobacco: Never   Vaping Use    Vaping Use: Never used   Substance and Sexual Activity    Alcohol use: Not Currently    Drug use: Not Currently    Sexual activity: Yes     Partners: Male     Comment:  and monogamous   Other Topics Concern    Not on file   Social History Narrative    Not on file     Social Determinants of Health     Financial Resource Strain: Low Risk     Difficulty of Paying Living Expenses: Not hard at all   Food Insecurity: No Food Insecurity    Worried About Running Out of Food in the Last Year: Never true    Ran Out of Food in the Last Year: Never true   Transportation Needs: No Transportation Needs Lack of Transportation (Medical): No    Lack of Transportation (Non-Medical): No   Physical Activity: Not on file   Stress: Not on file   Social Connections: Not on file   Intimate Partner Violence: Not on file   Housing Stability: Not on file     Family History   Problem Relation Age of Onset    Anemia Mother     Asthma Mother     Obesity Mother     Alcohol Abuse Neg Hx     Allergy (Severe) Neg Hx     Arthritis Neg Hx     Arrhythmia Neg Hx     Atrial Fibrillation Neg Hx     Birth Defects Neg Hx     Breast Cancer Neg Hx     Cancer Neg Hx     Coronary Art Dis Neg Hx     Colon Cancer Neg Hx     Depression Neg Hx     Diabetes Neg Hx     Early Death Neg Hx     Hearing Loss Neg Hx     Heart Attack Neg Hx     Heart Disease Neg Hx     High Blood Pressure Neg Hx     High Cholesterol Neg Hx     Kidney Disease Neg Hx     Learning Disabilities Neg Hx     Mental Illness Neg Hx     Mental Retardation Neg Hx     Miscarriages / Stillbirths Neg Hx     Osteoporosis Neg Hx     Prostate Cancer Neg Hx     Stroke Neg Hx     Substance Abuse Neg Hx     Vision Loss Neg Hx      Allergies:  Patient has no known allergies. Patient Active Problem List   Diagnosis    Acquired hypothyroidism    Allergic conjunctivitis of both eyes    Closed dislocation of finger of left hand    Diverticulosis of sigmoid colon    Mild persistent asthma without complication    Seasonal and perennial allergic rhinitis    Stiffness of finger joint, left    Small fiber neuropathy    Distal paresthesia    Neuropathic pain    Ventral hernia without obstruction or gangrene     Current Outpatient Medications on File Prior to Visit   Medication Sig Dispense Refill    thiamine 100 MG tablet TAKE 1 TABLET BY MOUTH EVERY DAY 90 tablet 1    loratadine (CLARITIN) 10 MG tablet Take 1 tablet by mouth daily 60 tablet 3    levothyroxine (SYNTHROID) 75 MCG tablet TAKE 1 TABLET BY MOUTH EVERY DAY 90 tablet 3    ciclopirox (PENLAC) 8 % solution Apply topically nightly.  6 mL 1 albuterol sulfate HFA (PROVENTIL;VENTOLIN;PROAIR) 108 (90 Base) MCG/ACT inhaler Inhale 2 puffs into the lungs every 4 hours as needed for Wheezing or Shortness of Breath 1 each 5    montelukast (SINGULAIR) 10 MG tablet Take 1 tablet by mouth nightly 90 tablet 3    potassium chloride (KLOR-CON M20) 20 MEQ extended release tablet TAKE 1 TABLET BY MOUTH TWICE A WEEK 30 tablet 2    folic acid (FOLVITE) 1 MG tablet TAKE 1 TABLET BY MOUTH EVERY DAY 90 tablet 3    mometasone (ELOCON) 0.1 % cream APPLY TO AFFECTED AREA TOPICALLY EVERY DAY 15 g 2    clotrimazole-betamethasone (LOTRISONE) 1-0.05 % cream Apply topically 2 times daily. 1 each 0    magnesium oxide (MAG-OX) 400 MG tablet TAKE 1 TABLET BY MOUTH EVERY DAY 90 tablet 3    conjugated estrogens (PREMARIN) 0.625 MG/GM vaginal cream Use 0.5 g pv 2 to 3 times weekly. 3 each 3    WIXELA INHUB 100-50 MCG/DOSE diskus inhaler INHALE 1 PUFF AS INSTRUCTED TWICE DAILY. RINSE AND GARGLE MOUTH WITH WATER AFTER EACH USE 60 each 5    albuterol (PROVENTIL) (2.5 MG/3ML) 0.083% nebulizer solution Inhale by nebulizer over 5-15 minutes three (3) to four (4) times a day as needed for cough/wheezing/shortness of breath 120 each 5    hydroCHLOROthiazide (HYDRODIURIL) 25 MG tablet TAKE 1 TABLET BY MOUTH EVERY DAY IN THE MORNING 90 tablet 3    gabapentin (NEURONTIN) 100 MG capsule Take 2 capsules by mouth 3 times daily for 90 days. Intended supply: 90 days 540 capsule 3     No current facility-administered medications on file prior to visit. Review of Systems   Constitutional:  Negative for chills, diaphoresis, fatigue and fever. HENT:  Negative for congestion, ear discharge, ear pain and sore throat. Respiratory:  Negative for cough, shortness of breath and wheezing. Cardiovascular:  Negative for chest pain. Gastrointestinal:  Negative for abdominal pain, diarrhea, nausea and vomiting. Endocrine: Negative for cold intolerance and heat intolerance.    Genitourinary:  Positive for frequency. Negative for dysuria. Neurological:  Negative for dizziness and light-headedness. Objective:   /70   Pulse 69   Temp 97.3 °F (36.3 °C)   Resp 18   Ht 5' 8\" (1.727 m)   Wt 169 lb 6.4 oz (76.8 kg)   LMP  (LMP Unknown)   SpO2 98%   BMI 25.76 kg/m²     Physical Exam  Constitutional:       General: She is not in acute distress. Appearance: She is not diaphoretic. Cardiovascular:      Rate and Rhythm: Normal rate and regular rhythm. Pulses: Normal pulses. Heart sounds: Normal heart sounds, S1 normal and S2 normal.   Pulmonary:      Effort: Pulmonary effort is normal. No respiratory distress. Breath sounds: Normal breath sounds. No wheezing or rales. Chest:      Chest wall: No tenderness. Abdominal:      General: Bowel sounds are normal.      Tenderness: There is no abdominal tenderness. Neurological:      Mental Status: She is alert. Assessment:       Diagnosis Orders   1. Frequent urination  POCT Urinalysis No Micro (Auto)    Urinalysis    sulfamethoxazole-trimethoprim (BACTRIM DS) 800-160 MG per tablet        Plan:      Orders Placed This Encounter   Procedures    Urinalysis     Standing Status:   Future     Number of Occurrences:   1     Standing Expiration Date:   1/20/2024    POCT Urinalysis No Micro (Auto)     Results for POC orders placed in visit on 01/20/23   POCT Urinalysis No Micro (Auto)   Result Value Ref Range    Color, UA yellow     Clarity, UA cloudy     Glucose, UA POC -     Bilirubin, UA -     Ketones, UA -*     Spec Grav, UA 1.010     Blood, UA POC 3+     pH, UA 7.0     Protein, UA POC -     Urobilinogen, UA -     Leukocytes, UA -     Nitrite, UA -      Orders Placed This Encounter   Medications    sulfamethoxazole-trimethoprim (BACTRIM DS) 800-160 MG per tablet     Sig: Take 1 tablet by mouth 2 times daily for 3 days     Dispense:  6 tablet     Refill:  0     Return if symptoms worsen or fail to improve.

## 2023-01-20 NOTE — TELEPHONE ENCOUNTER
----- Message from Sierra Balderas sent at 1/20/2023  3:49 PM EST -----  Subject: Message to Provider    QUESTIONS  Information for Provider? PT has a further questions about a pee test that   she forgot to ask PCP. She is really worried. Please call her back  ---------------------------------------------------------------------------  --------------  Moon GODFREY  [phone]; OK to leave message on voicemail  ---------------------------------------------------------------------------  --------------  SCRIPT ANSWERS  Relationship to Patient?  Self

## 2023-01-21 RX ORDER — SULFAMETHOXAZOLE AND TRIMETHOPRIM 800; 160 MG/1; MG/1
1 TABLET ORAL 2 TIMES DAILY
Qty: 6 TABLET | Refills: 0 | Status: SHIPPED | OUTPATIENT
Start: 2023-01-21 | End: 2023-01-24

## 2023-01-21 ASSESSMENT — ENCOUNTER SYMPTOMS
ABDOMINAL PAIN: 0
WHEEZING: 0
SHORTNESS OF BREATH: 0
NAUSEA: 0
VOMITING: 0
DIARRHEA: 0
COUGH: 0
SORE THROAT: 0

## 2023-03-06 SDOH — ECONOMIC STABILITY: INCOME INSECURITY: HOW HARD IS IT FOR YOU TO PAY FOR THE VERY BASICS LIKE FOOD, HOUSING, MEDICAL CARE, AND HEATING?: NOT VERY HARD

## 2023-03-06 SDOH — ECONOMIC STABILITY: FOOD INSECURITY: WITHIN THE PAST 12 MONTHS, YOU WORRIED THAT YOUR FOOD WOULD RUN OUT BEFORE YOU GOT MONEY TO BUY MORE.: NEVER TRUE

## 2023-03-06 SDOH — ECONOMIC STABILITY: HOUSING INSECURITY
IN THE LAST 12 MONTHS, WAS THERE A TIME WHEN YOU DID NOT HAVE A STEADY PLACE TO SLEEP OR SLEPT IN A SHELTER (INCLUDING NOW)?: NO

## 2023-03-06 SDOH — ECONOMIC STABILITY: FOOD INSECURITY: WITHIN THE PAST 12 MONTHS, THE FOOD YOU BOUGHT JUST DIDN'T LAST AND YOU DIDN'T HAVE MONEY TO GET MORE.: NEVER TRUE

## 2023-03-07 ENCOUNTER — OFFICE VISIT (OUTPATIENT)
Dept: PRIMARY CARE CLINIC | Age: 58
End: 2023-03-07
Payer: COMMERCIAL

## 2023-03-07 VITALS
OXYGEN SATURATION: 97 % | TEMPERATURE: 97.6 F | BODY MASS INDEX: 24.86 KG/M2 | SYSTOLIC BLOOD PRESSURE: 122 MMHG | DIASTOLIC BLOOD PRESSURE: 70 MMHG | WEIGHT: 164 LBS | HEIGHT: 68 IN | HEART RATE: 71 BPM | RESPIRATION RATE: 18 BRPM

## 2023-03-07 DIAGNOSIS — G62.9 PERIPHERAL POLYNEUROPATHY: Primary | ICD-10-CM

## 2023-03-07 PROCEDURE — 99213 OFFICE O/P EST LOW 20 MIN: CPT | Performed by: INTERNAL MEDICINE

## 2023-03-07 SDOH — ECONOMIC STABILITY: FOOD INSECURITY: WITHIN THE PAST 12 MONTHS, YOU WORRIED THAT YOUR FOOD WOULD RUN OUT BEFORE YOU GOT MONEY TO BUY MORE.: NEVER TRUE

## 2023-03-07 SDOH — ECONOMIC STABILITY: FOOD INSECURITY: WITHIN THE PAST 12 MONTHS, THE FOOD YOU BOUGHT JUST DIDN'T LAST AND YOU DIDN'T HAVE MONEY TO GET MORE.: NEVER TRUE

## 2023-03-07 SDOH — ECONOMIC STABILITY: INCOME INSECURITY: HOW HARD IS IT FOR YOU TO PAY FOR THE VERY BASICS LIKE FOOD, HOUSING, MEDICAL CARE, AND HEATING?: NOT HARD AT ALL

## 2023-03-07 ASSESSMENT — ENCOUNTER SYMPTOMS
SHORTNESS OF BREATH: 0
NAUSEA: 0
DIARRHEA: 0
VOMITING: 0
COUGH: 0
WHEEZING: 0
ABDOMINAL PAIN: 0

## 2023-03-07 NOTE — PROGRESS NOTES
Subjective:      Patient ID: Chrystal Arango. Curtis Viera is a 66 y.o. female    Follow up   HPI  Pt presents or follow up regarding neuropathy. Attributed to toxins and alcohol. Much controlled with gabapentin, Follows with neuro.     Past Medical History:   Diagnosis Date    Allergic rhinitis     Allergies     Asthma     Closed dislocation of finger of left hand 2018    Dermoid tumor     Essential hypertension 5/15/2018    Hyperthyroidism     Thyroid disease      Past Surgical History:   Procedure Laterality Date    LAPAROSCOPY Right 10/20/2021    LAPARSCOPIC ADNEXAL MASS REMOVAL WITH  RIGHT SALPINGO OOPHORECTOMY; INTRAUTERINE DEVICE REMOVAL performed by Gely Santillan MD at [address] [place] [address] Bilateral 2021    MANDIBLE SURGERY      OVARY REMOVAL Right 10/2021    due to cyst    VENTRAL HERNIA REPAIR N/A 9/15/2022    VENTRAL HERNIA REPAIR WITH MESH performed by Samantha Alcantara MD at [address] [place] [address] History     Socioeconomic History    Marital status:      Spouse name: Tay Opal    Number of children: 0    Years of education: 23    Highest education level: Professional school degree (e.g., MD, DDS, DVM, VIVIENNE)   Occupational History    Occupation: retired   Tobacco Use    Smoking status: Former     Packs/day: 0.25     Years: 5.00     Pack years: 1.25     Types: Cigarettes     Quit date: 1990     Years since quittin.6    Smokeless tobacco: Never   Vaping Use    Vaping Use: Never used   Substance and Sexual Activity    Alcohol use: Not Currently    Drug use: Not Currently    Sexual activity: Yes     Partners: Male     Comment:  and monogamous   Other Topics Concern    Not on file   Social History Narrative    Not on file     Social Determinants of Health     Financial Resource Strain: Low Risk     Difficulty of Paying Living Expenses: Not hard at all   Food Insecurity: No Food Insecurity    Worried About Running Out of Food in the Last Year: Never true    [address] in the Last Year: Never true Transportation Needs: No Transportation Needs    Lack of Transportation (Medical): No    Lack of Transportation (Non-Medical): No   Physical Activity: Not on file   Stress: Not on file   Social Connections: Not on file   Intimate Partner Violence: Not on file   Housing Stability: Unknown    Unable to Pay for Housing in the Last Year: Not on file    Number of Places Lived in the Last Year: Not on file    Unstable Housing in the Last Year: No     Family History   Problem Relation Age of Onset    Anemia Mother     Asthma Mother     Obesity Mother     Alcohol Abuse Neg Hx     Allergy (Severe) Neg Hx     Arthritis Neg Hx     Arrhythmia Neg Hx     Atrial Fibrillation Neg Hx     Birth Defects Neg Hx     Breast Cancer Neg Hx     Cancer Neg Hx     Coronary Art Dis Neg Hx     Colon Cancer Neg Hx     Depression Neg Hx     Diabetes Neg Hx     Early Death Neg Hx     Hearing Loss Neg Hx     Heart Attack Neg Hx     Heart Disease Neg Hx     High Blood Pressure Neg Hx     High Cholesterol Neg Hx     Kidney Disease Neg Hx     Learning Disabilities Neg Hx     Mental Illness Neg Hx     Mental Retardation Neg Hx     Miscarriages / Stillbirths Neg Hx     Osteoporosis Neg Hx     Prostate Cancer Neg Hx     Stroke Neg Hx     Substance Abuse Neg Hx     Vision Loss Neg Hx      Allergies:  Patient has no known allergies.   Patient Active Problem List   Diagnosis    Acquired hypothyroidism    Allergic conjunctivitis of both eyes    Closed dislocation of finger of left hand    Diverticulosis of sigmoid colon    Mild persistent asthma without complication    Seasonal and perennial allergic rhinitis    Stiffness of finger joint, left    Small fiber neuropathy    Distal paresthesia    Neuropathic pain    Ventral hernia without obstruction or gangrene     Current Outpatient Medications on File Prior to Visit   Medication Sig Dispense Refill    thiamine 100 MG tablet TAKE 1 TABLET BY MOUTH EVERY DAY 90 tablet 1    levothyroxine (SYNTHROID) 75 MCG tablet TAKE 1 TABLET BY MOUTH EVERY DAY 90 tablet 3    hydroCHLOROthiazide (HYDRODIURIL) 25 MG tablet TAKE 1 TABLET BY MOUTH EVERY DAY IN THE MORNING 90 tablet 3    ciclopirox (PENLAC) 8 % solution Apply topically nightly. 6 mL 1    albuterol sulfate HFA (PROVENTIL;VENTOLIN;PROAIR) 108 (90 Base) MCG/ACT inhaler Inhale 2 puffs into the lungs every 4 hours as needed for Wheezing or Shortness of Breath 1 each 5    montelukast (SINGULAIR) 10 MG tablet Take 1 tablet by mouth nightly 90 tablet 3    potassium chloride (KLOR-CON M20) 20 MEQ extended release tablet TAKE 1 TABLET BY MOUTH TWICE A WEEK 30 tablet 2    folic acid (FOLVITE) 1 MG tablet TAKE 1 TABLET BY MOUTH EVERY DAY 90 tablet 3    mometasone (ELOCON) 0.1 % cream APPLY TO AFFECTED AREA TOPICALLY EVERY DAY 15 g 2    clotrimazole-betamethasone (LOTRISONE) 1-0.05 % cream Apply topically 2 times daily. 1 each 0    magnesium oxide (MAG-OX) 400 MG tablet TAKE 1 TABLET BY MOUTH EVERY DAY 90 tablet 3    conjugated estrogens (PREMARIN) 0.625 MG/GM vaginal cream Use 0.5 g pv 2 to 3 times weekly. 3 each 3    WIXELA INHUB 100-50 MCG/DOSE diskus inhaler INHALE 1 PUFF AS INSTRUCTED TWICE DAILY. RINSE AND GARGLE MOUTH WITH WATER AFTER EACH USE 60 each 5    albuterol (PROVENTIL) (2.5 MG/3ML) 0.083% nebulizer solution Inhale by nebulizer over 5-15 minutes three (3) to four (4) times a day as needed for cough/wheezing/shortness of breath 120 each 5    gabapentin (NEURONTIN) 100 MG capsule Take 2 capsules by mouth 3 times daily for 90 days. Intended supply: 90 days 540 capsule 3     No current facility-administered medications on file prior to visit. Review of Systems   Constitutional:  Negative for chills, diaphoresis, fatigue and fever. Respiratory:  Negative for cough, shortness of breath and wheezing. Cardiovascular:  Negative for chest pain. Gastrointestinal:  Negative for abdominal pain, diarrhea, nausea and vomiting.    Endocrine: Negative for cold intolerance and heat intolerance. Genitourinary:  Negative for dysuria and frequency. Neurological:  Negative for dizziness and light-headedness. Objective:   /70   Pulse 71   Temp 97.6 °F (36.4 °C)   Resp 18   Ht 5' 8\" (1.727 m)   Wt 164 lb (74.4 kg)   LMP  (LMP Unknown)   SpO2 97%   BMI 24.94 kg/m²     Physical Exam  Constitutional:       General: She is not in acute distress. Appearance: She is not diaphoretic. Cardiovascular:      Rate and Rhythm: Normal rate and regular rhythm. Pulses: Normal pulses. Heart sounds: Normal heart sounds, S1 normal and S2 normal.   Pulmonary:      Effort: Pulmonary effort is normal. No respiratory distress. Breath sounds: Normal breath sounds. No wheezing or rales. Chest:      Chest wall: No tenderness. Abdominal:      General: Bowel sounds are normal.      Tenderness: There is no abdominal tenderness. Neurological:      Mental Status: She is alert. Assessment:       Diagnosis Orders   1. Peripheral polyneuropathy Controlled         Plan:      No orders of the defined types were placed in this encounter. Return in about 6 months (around 9/7/2023) for follow up, with PCP.

## 2023-03-22 DIAGNOSIS — R20.2 NUMBNESS AND TINGLING OF BOTH FEET: ICD-10-CM

## 2023-03-22 DIAGNOSIS — R20.0 NUMBNESS AND TINGLING OF BOTH FEET: ICD-10-CM

## 2023-03-26 RX ORDER — GABAPENTIN 100 MG/1
CAPSULE ORAL
Qty: 540 CAPSULE | Refills: 3 | Status: SHIPPED | OUTPATIENT
Start: 2023-03-26 | End: 2023-06-24

## 2023-04-10 DIAGNOSIS — B35.1 ONYCHOMYCOSIS: ICD-10-CM

## 2023-05-11 ENCOUNTER — OFFICE VISIT (OUTPATIENT)
Dept: PRIMARY CARE CLINIC | Age: 58
End: 2023-05-11
Payer: COMMERCIAL

## 2023-05-11 ENCOUNTER — OFFICE VISIT (OUTPATIENT)
Dept: OBGYN CLINIC | Age: 58
End: 2023-05-11
Payer: COMMERCIAL

## 2023-05-11 VITALS
SYSTOLIC BLOOD PRESSURE: 126 MMHG | WEIGHT: 166 LBS | BODY MASS INDEX: 25.16 KG/M2 | DIASTOLIC BLOOD PRESSURE: 78 MMHG | HEIGHT: 68 IN

## 2023-05-11 VITALS
HEIGHT: 68 IN | SYSTOLIC BLOOD PRESSURE: 110 MMHG | OXYGEN SATURATION: 100 % | WEIGHT: 166 LBS | HEART RATE: 71 BPM | TEMPERATURE: 97.3 F | DIASTOLIC BLOOD PRESSURE: 60 MMHG | RESPIRATION RATE: 18 BRPM | BODY MASS INDEX: 25.16 KG/M2

## 2023-05-11 DIAGNOSIS — F43.22 ADJUSTMENT DISORDER WITH ANXIOUS MOOD: Primary | ICD-10-CM

## 2023-05-11 DIAGNOSIS — F41.9 ANXIETY: Primary | ICD-10-CM

## 2023-05-11 PROCEDURE — 99213 OFFICE O/P EST LOW 20 MIN: CPT | Performed by: INTERNAL MEDICINE

## 2023-05-11 PROCEDURE — 99212 OFFICE O/P EST SF 10 MIN: CPT | Performed by: OBSTETRICS & GYNECOLOGY

## 2023-05-11 RX ORDER — CHLORAL HYDRATE 500 MG
CAPSULE ORAL DAILY
COMMUNITY

## 2023-05-11 RX ORDER — MULTIVIT-MIN/IRON/FOLIC ACID/K 18-600-40
CAPSULE ORAL
COMMUNITY

## 2023-05-11 ASSESSMENT — ENCOUNTER SYMPTOMS
RESPIRATORY NEGATIVE: 1
DIARRHEA: 0
ALLERGIC/IMMUNOLOGIC NEGATIVE: 1
ABDOMINAL DISTENTION: 0
ANAL BLEEDING: 0
RECTAL PAIN: 0
SHORTNESS OF BREATH: 0
DIARRHEA: 0
VOMITING: 0
COUGH: 0
CONSTIPATION: 0
VOMITING: 0
WHEEZING: 0
BLOOD IN STOOL: 0
ABDOMINAL PAIN: 0
EYES NEGATIVE: 1
NAUSEA: 0
NAUSEA: 0
ABDOMINAL PAIN: 0

## 2023-05-11 NOTE — PROGRESS NOTES
wheezing or rales. Chest:      Chest wall: No tenderness. Abdominal:      General: Bowel sounds are normal.      Tenderness: There is no abdominal tenderness. Neurological:      Mental Status: She is alert. Psychiatric:         Mood and Affect: Mood normal.         Thought Content: Thought content normal.     Assessment:       Diagnosis Orders   1. Adjustment disorder with anxious mood  Ambulatory referral to Psychology        Plan:      Orders Placed This Encounter   Procedures    Ambulatory referral to Psychology     Referral Priority:   Routine     Referral Type:   Eval and Treat     Referral Reason:   Specialty Services Required     Referred to Provider:   Mary Mosqueda PSYD     Requested Specialty:   Psychology     Number of Visits Requested:   1     Return if symptoms worsen or fail to improve.

## 2023-05-11 NOTE — PROGRESS NOTES
Number of children: 0    Years of education: 23    Highest education level: Professional school degree (e.g., MD, DDS, DVM, VIVIENNE)   Occupational History    Occupation: retired   Tobacco Use    Smoking status: Former     Packs/day: 0.25     Years: 5.00     Pack years: 1.25     Types: Cigarettes     Quit date: 1990     Years since quittin.8    Smokeless tobacco: Never   Vaping Use    Vaping Use: Never used   Substance and Sexual Activity    Alcohol use: Not Currently    Drug use: Not Currently    Sexual activity: Yes     Partners: Male     Comment:  and monogamous   Other Topics Concern    Not on file   Social History Narrative    Not on file     Social Determinants of Health     Financial Resource Strain: Low Risk     Difficulty of Paying Living Expenses: Not hard at all   Food Insecurity: No Food Insecurity    Worried About Running Out of Food in the Last Year: Never true    [address] in the Last Year: Never true   Transportation Needs: No Transportation Needs    Lack of Transportation (Medical): No    Lack of Transportation (Non-Medical):  No   Physical Activity: Not on file   Stress: Not on file   Social Connections: Not on file   Intimate Partner Violence: Not on file   Housing Stability: Unknown    Unable to Pay for Housing in the Last Year: Not on file    Number of Places Lived in the Last Year: Not on file    Unstable Housing in the Last Year: No        Family History   Problem Relation Age of Onset    Anemia Mother     Asthma Mother     Obesity Mother     Alcohol Abuse Neg Hx     Allergy (Severe) Neg Hx     Arthritis Neg Hx     Arrhythmia Neg Hx     Atrial Fibrillation Neg Hx     Birth Defects Neg Hx     Breast Cancer Neg Hx     Cancer Neg Hx     Coronary Art Dis Neg Hx     Colon Cancer Neg Hx     Depression Neg Hx     Diabetes Neg Hx     Early Death Neg Hx     Hearing Loss Neg Hx     Heart Attack Neg Hx     Heart Disease Neg Hx     High Blood Pressure Neg Hx     High Cholesterol Neg Hx

## 2023-06-07 ENCOUNTER — OFFICE VISIT (OUTPATIENT)
Dept: PRIMARY CARE CLINIC | Age: 58
End: 2023-06-07

## 2023-06-07 ENCOUNTER — TELEPHONE (OUTPATIENT)
Dept: PRIMARY CARE CLINIC | Age: 58
End: 2023-06-07

## 2023-06-07 VITALS
SYSTOLIC BLOOD PRESSURE: 118 MMHG | OXYGEN SATURATION: 99 % | DIASTOLIC BLOOD PRESSURE: 60 MMHG | TEMPERATURE: 97.8 F | RESPIRATION RATE: 18 BRPM | WEIGHT: 166.6 LBS | HEIGHT: 68 IN | HEART RATE: 91 BPM | BODY MASS INDEX: 25.25 KG/M2

## 2023-06-07 DIAGNOSIS — E66.3 OVERWEIGHT: Primary | ICD-10-CM

## 2023-06-07 PROCEDURE — 99999 PR OFFICE/OUTPT VISIT,PROCEDURE ONLY: CPT | Performed by: INTERNAL MEDICINE

## 2023-06-07 NOTE — PROGRESS NOTES
Subjective:      Patient ID: Miguel Moody. Beatrice Gold is a 66 y.o. female    Weight management   HPI  Pt presents for medication assistance with weight management. Based on BMI she does not qualify for pharmacotherapy. As such she should not have been called for a visit today. Pt advised of this and she expressed understanding. Encouraged to continue dedicated exercise and low calorie diet.

## 2023-06-12 DIAGNOSIS — B35.1 ONYCHOMYCOSIS: ICD-10-CM

## 2023-06-12 RX ORDER — LORATADINE 10 MG/1
10 TABLET ORAL DAILY
Qty: 90 TABLET | Refills: 3 | Status: SHIPPED | OUTPATIENT
Start: 2023-06-12

## 2023-06-12 RX ORDER — LANOLIN ALCOHOL/MO/W.PET/CERES
100 CREAM (GRAM) TOPICAL DAILY
Qty: 90 TABLET | Refills: 3 | Status: SHIPPED | OUTPATIENT
Start: 2023-06-12

## 2023-06-17 DIAGNOSIS — Z00.00 PREVENTATIVE HEALTH CARE: ICD-10-CM

## 2023-06-18 DIAGNOSIS — B35.1 ONYCHOMYCOSIS: Primary | ICD-10-CM

## 2023-06-20 RX ORDER — FOLIC ACID 1 MG/1
TABLET ORAL
Qty: 90 TABLET | Refills: 3 | Status: SHIPPED | OUTPATIENT
Start: 2023-06-20

## 2023-06-28 ASSESSMENT — PATIENT HEALTH QUESTIONNAIRE - PHQ9
7. TROUBLE CONCENTRATING ON THINGS, SUCH AS READING THE NEWSPAPER OR WATCHING TELEVISION: SEVERAL DAYS
8. MOVING OR SPEAKING SO SLOWLY THAT OTHER PEOPLE COULD HAVE NOTICED. OR THE OPPOSITE - BEING SO FIDGETY OR RESTLESS THAT YOU HAVE BEEN MOVING AROUND A LOT MORE THAN USUAL: NOT AT ALL
1. LITTLE INTEREST OR PLEASURE IN DOING THINGS: 1
4. FEELING TIRED OR HAVING LITTLE ENERGY: SEVERAL DAYS
9. THOUGHTS THAT YOU WOULD BE BETTER OFF DEAD, OR OF HURTING YOURSELF: NOT AT ALL
10. IF YOU CHECKED OFF ANY PROBLEMS, HOW DIFFICULT HAVE THESE PROBLEMS MADE IT FOR YOU TO DO YOUR WORK, TAKE CARE OF THINGS AT HOME, OR GET ALONG WITH OTHER PEOPLE: SOMEWHAT DIFFICULT
6. FEELING BAD ABOUT YOURSELF - OR THAT YOU ARE A FAILURE OR HAVE LET YOURSELF OR YOUR FAMILY DOWN: 2
SUM OF ALL RESPONSES TO PHQ QUESTIONS 1-9: 8
1. LITTLE INTEREST OR PLEASURE IN DOING THINGS: SEVERAL DAYS
5. POOR APPETITE OR OVEREATING: 1
9. THOUGHTS THAT YOU WOULD BE BETTER OFF DEAD, OR OF HURTING YOURSELF: 0
4. FEELING TIRED OR HAVING LITTLE ENERGY: 1
2. FEELING DOWN, DEPRESSED OR HOPELESS: 1
10. IF YOU CHECKED OFF ANY PROBLEMS, HOW DIFFICULT HAVE THESE PROBLEMS MADE IT FOR YOU TO DO YOUR WORK, TAKE CARE OF THINGS AT HOME, OR GET ALONG WITH OTHER PEOPLE: 1
7. TROUBLE CONCENTRATING ON THINGS, SUCH AS READING THE NEWSPAPER OR WATCHING TELEVISION: 1
3. TROUBLE FALLING OR STAYING ASLEEP: SEVERAL DAYS
2. FEELING DOWN, DEPRESSED OR HOPELESS: SEVERAL DAYS
SUM OF ALL RESPONSES TO PHQ QUESTIONS 1-9: 8
SUM OF ALL RESPONSES TO PHQ QUESTIONS 1-9: 8
5. POOR APPETITE OR OVEREATING: SEVERAL DAYS
SUM OF ALL RESPONSES TO PHQ QUESTIONS 1-9: 8
3. TROUBLE FALLING OR STAYING ASLEEP: 1
SUM OF ALL RESPONSES TO PHQ QUESTIONS 1-9: 8
6. FEELING BAD ABOUT YOURSELF - OR THAT YOU ARE A FAILURE OR HAVE LET YOURSELF OR YOUR FAMILY DOWN: MORE THAN HALF THE DAYS
SUM OF ALL RESPONSES TO PHQ9 QUESTIONS 1 & 2: 2
8. MOVING OR SPEAKING SO SLOWLY THAT OTHER PEOPLE COULD HAVE NOTICED. OR THE OPPOSITE, BEING SO FIGETY OR RESTLESS THAT YOU HAVE BEEN MOVING AROUND A LOT MORE THAN USUAL: 0

## 2023-06-28 ASSESSMENT — ANXIETY QUESTIONNAIRES
4. TROUBLE RELAXING: 1
6. BECOMING EASILY ANNOYED OR IRRITABLE: SEVERAL DAYS
4. TROUBLE RELAXING: SEVERAL DAYS
5. BEING SO RESTLESS THAT IT IS HARD TO SIT STILL: 1
7. FEELING AFRAID AS IF SOMETHING AWFUL MIGHT HAPPEN: 3
3. WORRYING TOO MUCH ABOUT DIFFERENT THINGS: MORE THAN HALF THE DAYS
1. FEELING NERVOUS, ANXIOUS, OR ON EDGE: SEVERAL DAYS
IF YOU CHECKED OFF ANY PROBLEMS ON THIS QUESTIONNAIRE, HOW DIFFICULT HAVE THESE PROBLEMS MADE IT FOR YOU TO DO YOUR WORK, TAKE CARE OF THINGS AT HOME, OR GET ALONG WITH OTHER PEOPLE: VERY DIFFICULT
IF YOU CHECKED OFF ANY PROBLEMS ON THIS QUESTIONNAIRE, HOW DIFFICULT HAVE THESE PROBLEMS MADE IT FOR YOU TO DO YOUR WORK, TAKE CARE OF THINGS AT HOME, OR GET ALONG WITH OTHER PEOPLE: VERY DIFFICULT
2. NOT BEING ABLE TO STOP OR CONTROL WORRYING: MORE THAN HALF THE DAYS
3. WORRYING TOO MUCH ABOUT DIFFERENT THINGS: 2
6. BECOMING EASILY ANNOYED OR IRRITABLE: 1
1. FEELING NERVOUS, ANXIOUS, OR ON EDGE: 1
2. NOT BEING ABLE TO STOP OR CONTROL WORRYING: 2
7. FEELING AFRAID AS IF SOMETHING AWFUL MIGHT HAPPEN: NEARLY EVERY DAY
GAD7 TOTAL SCORE: 11
5. BEING SO RESTLESS THAT IT IS HARD TO SIT STILL: SEVERAL DAYS

## 2023-06-28 ASSESSMENT — LIFESTYLE VARIABLES
PAST THREE MONTHS WHAT IS THE LARGEST AMOUNT OF ALCOHOLIC DRINKS YOU HAVE CONSUMED IN ONE DAY: 0
HISTORY_ALCOHOL_USE: NO
HAVE YOU EVER RECEIVED ALCOHOL OR OTHER DRUG ABUSE TREATMENT: NO
ALCOHOL_DAYS_PER_WEEK: 0

## 2023-06-30 ENCOUNTER — OFFICE VISIT (OUTPATIENT)
Dept: BEHAVIORAL/MENTAL HEALTH CLINIC | Age: 58
End: 2023-06-30

## 2023-06-30 DIAGNOSIS — Z79.899 MEDICAL MARIJUANA USE: ICD-10-CM

## 2023-06-30 DIAGNOSIS — Z87.898 HISTORY OF ALCOHOL USE DISORDER: ICD-10-CM

## 2023-06-30 DIAGNOSIS — F41.1 GAD (GENERALIZED ANXIETY DISORDER): Primary | ICD-10-CM

## 2023-06-30 ASSESSMENT — PATIENT HEALTH QUESTIONNAIRE - PHQ9
7. TROUBLE CONCENTRATING ON THINGS, SUCH AS READING THE NEWSPAPER OR WATCHING TELEVISION: 0
SUM OF ALL RESPONSES TO PHQ QUESTIONS 1-9: 5
1. LITTLE INTEREST OR PLEASURE IN DOING THINGS: 1
SUM OF ALL RESPONSES TO PHQ QUESTIONS 1-9: 5
SUM OF ALL RESPONSES TO PHQ9 QUESTIONS 1 & 2: 2
10. IF YOU CHECKED OFF ANY PROBLEMS, HOW DIFFICULT HAVE THESE PROBLEMS MADE IT FOR YOU TO DO YOUR WORK, TAKE CARE OF THINGS AT HOME, OR GET ALONG WITH OTHER PEOPLE: 1
4. FEELING TIRED OR HAVING LITTLE ENERGY: 1
SUM OF ALL RESPONSES TO PHQ QUESTIONS 1-9: 5
SUM OF ALL RESPONSES TO PHQ QUESTIONS 1-9: 5
9. THOUGHTS THAT YOU WOULD BE BETTER OFF DEAD, OR OF HURTING YOURSELF: 0
2. FEELING DOWN, DEPRESSED OR HOPELESS: 1
8. MOVING OR SPEAKING SO SLOWLY THAT OTHER PEOPLE COULD HAVE NOTICED. OR THE OPPOSITE, BEING SO FIGETY OR RESTLESS THAT YOU HAVE BEEN MOVING AROUND A LOT MORE THAN USUAL: 0
3. TROUBLE FALLING OR STAYING ASLEEP: 1
6. FEELING BAD ABOUT YOURSELF - OR THAT YOU ARE A FAILURE OR HAVE LET YOURSELF OR YOUR FAMILY DOWN: 1
5. POOR APPETITE OR OVEREATING: 0

## 2023-06-30 ASSESSMENT — ANXIETY QUESTIONNAIRES
IF YOU CHECKED OFF ANY PROBLEMS ON THIS QUESTIONNAIRE, HOW DIFFICULT HAVE THESE PROBLEMS MADE IT FOR YOU TO DO YOUR WORK, TAKE CARE OF THINGS AT HOME, OR GET ALONG WITH OTHER PEOPLE: SOMEWHAT DIFFICULT
GAD7 TOTAL SCORE: 8
1. FEELING NERVOUS, ANXIOUS, OR ON EDGE: 2
3. WORRYING TOO MUCH ABOUT DIFFERENT THINGS: 2
6. BECOMING EASILY ANNOYED OR IRRITABLE: 0
7. FEELING AFRAID AS IF SOMETHING AWFUL MIGHT HAPPEN: 2
4. TROUBLE RELAXING: 1
2. NOT BEING ABLE TO STOP OR CONTROL WORRYING: 1
5. BEING SO RESTLESS THAT IT IS HARD TO SIT STILL: 0

## 2023-07-03 ENCOUNTER — OFFICE VISIT (OUTPATIENT)
Dept: PRIMARY CARE CLINIC | Age: 58
End: 2023-07-03

## 2023-07-03 VITALS
HEIGHT: 68 IN | SYSTOLIC BLOOD PRESSURE: 122 MMHG | RESPIRATION RATE: 18 BRPM | BODY MASS INDEX: 25.46 KG/M2 | OXYGEN SATURATION: 99 % | WEIGHT: 168 LBS | DIASTOLIC BLOOD PRESSURE: 62 MMHG | HEART RATE: 76 BPM | TEMPERATURE: 97.2 F

## 2023-07-03 DIAGNOSIS — Z00.00 HEALTH CARE MAINTENANCE: ICD-10-CM

## 2023-07-03 DIAGNOSIS — H61.21 IMPACTED CERUMEN OF RIGHT EAR: ICD-10-CM

## 2023-07-03 DIAGNOSIS — Z91.89 AT RISK FOR OBSTRUCTIVE SLEEP APNEA: Primary | ICD-10-CM

## 2023-07-03 DIAGNOSIS — R40.0 DAYTIME SOMNOLENCE: ICD-10-CM

## 2023-07-03 ASSESSMENT — ENCOUNTER SYMPTOMS
SHORTNESS OF BREATH: 0
WHEEZING: 0
COUGH: 0
ABDOMINAL PAIN: 0

## 2023-07-03 NOTE — PROGRESS NOTES
Insecurity: No Food Insecurity    Worried About Running Out of Food in the Last Year: Never true    Ran Out of Food in the Last Year: Never true   Transportation Needs: No Transportation Needs    Lack of Transportation (Medical): No    Lack of Transportation (Non-Medical): No   Physical Activity: Not on file   Stress: Not on file   Social Connections: Not on file   Intimate Partner Violence: Not on file   Housing Stability: Unknown    Unable to Pay for Housing in the Last Year: Not on file    Number of Places Lived in the Last Year: Not on file    Unstable Housing in the Last Year: No     Family History   Problem Relation Age of Onset    Anemia Mother     Asthma Mother     Obesity Mother     Alcohol Abuse Neg Hx     Allergy (Severe) Neg Hx     Arthritis Neg Hx     Arrhythmia Neg Hx     Atrial Fibrillation Neg Hx     Birth Defects Neg Hx     Breast Cancer Neg Hx     Cancer Neg Hx     Coronary Art Dis Neg Hx     Colon Cancer Neg Hx     Depression Neg Hx     Diabetes Neg Hx     Early Death Neg Hx     Hearing Loss Neg Hx     Heart Attack Neg Hx     Heart Disease Neg Hx     High Blood Pressure Neg Hx     High Cholesterol Neg Hx     Kidney Disease Neg Hx     Learning Disabilities Neg Hx     Mental Illness Neg Hx     Mental Retardation Neg Hx     Miscarriages / Stillbirths Neg Hx     Osteoporosis Neg Hx     Prostate Cancer Neg Hx     Stroke Neg Hx     Substance Abuse Neg Hx     Vision Loss Neg Hx    Allergies:  Patient has no known allergies.   Patient Active Problem List   Diagnosis    Acquired hypothyroidism    Allergic conjunctivitis of both eyes    Closed dislocation of finger of left hand    Diverticulosis of sigmoid colon    Mild persistent asthma without complication    Seasonal and perennial allergic rhinitis    Stiffness of finger joint, left    Small fiber neuropathy    Distal paresthesia    Neuropathic pain    Ventral hernia without obstruction or gangrene     Current Outpatient Medications on File Prior to Visit

## 2023-07-05 DIAGNOSIS — Z00.00 HEALTH CARE MAINTENANCE: ICD-10-CM

## 2023-07-05 LAB
ALBUMIN SERPL-MCNC: 4.4 G/DL (ref 3.5–4.6)
ALP SERPL-CCNC: 68 U/L (ref 40–130)
ALT SERPL-CCNC: 18 U/L (ref 0–33)
ANION GAP SERPL CALCULATED.3IONS-SCNC: 15 MEQ/L (ref 9–15)
AST SERPL-CCNC: 29 U/L (ref 0–35)
BASOPHILS # BLD: 0 K/UL (ref 0–0.2)
BASOPHILS NFR BLD: 0.9 %
BILIRUB SERPL-MCNC: 1.1 MG/DL (ref 0.2–0.7)
BUN SERPL-MCNC: 13 MG/DL (ref 6–20)
CALCIUM SERPL-MCNC: 9.6 MG/DL (ref 8.5–9.9)
CHLORIDE SERPL-SCNC: 93 MEQ/L (ref 95–107)
CO2 SERPL-SCNC: 24 MEQ/L (ref 20–31)
CREAT SERPL-MCNC: 0.85 MG/DL (ref 0.5–0.9)
EOSINOPHIL # BLD: 0.1 K/UL (ref 0–0.7)
EOSINOPHIL NFR BLD: 2.9 %
ERYTHROCYTE [DISTWIDTH] IN BLOOD BY AUTOMATED COUNT: 13.6 % (ref 11.5–14.5)
GLOBULIN SER CALC-MCNC: 2.6 G/DL (ref 2.3–3.5)
GLUCOSE SERPL-MCNC: 94 MG/DL (ref 70–99)
HCT VFR BLD AUTO: 34.3 % (ref 37–47)
HGB BLD-MCNC: 11.6 G/DL (ref 12–16)
LYMPHOCYTES # BLD: 1.4 K/UL (ref 1–4.8)
LYMPHOCYTES NFR BLD: 31.7 %
MCH RBC QN AUTO: 29.4 PG (ref 27–31.3)
MCHC RBC AUTO-ENTMCNC: 33.8 % (ref 33–37)
MCV RBC AUTO: 86.8 FL (ref 79.4–94.8)
MONOCYTES # BLD: 0.3 K/UL (ref 0.2–0.8)
MONOCYTES NFR BLD: 7.5 %
NEUTROPHILS # BLD: 2.6 K/UL (ref 1.4–6.5)
NEUTS SEG NFR BLD: 57 %
PLATELET # BLD AUTO: 268 K/UL (ref 130–400)
POTASSIUM SERPL-SCNC: 4.1 MEQ/L (ref 3.4–4.9)
PROT SERPL-MCNC: 7 G/DL (ref 6.3–8)
RBC # BLD AUTO: 3.94 M/UL (ref 4.2–5.4)
SODIUM SERPL-SCNC: 132 MEQ/L (ref 135–144)
TSH REFLEX: 1.06 UIU/ML (ref 0.44–3.86)
WBC # BLD AUTO: 4.5 K/UL (ref 4.8–10.8)

## 2023-07-06 LAB — VITAMIN D 25-HYDROXY: 37.1 NG/ML

## 2023-07-12 DIAGNOSIS — E87.1 HYPONATREMIA: Primary | ICD-10-CM

## 2023-07-17 ENCOUNTER — OFFICE VISIT (OUTPATIENT)
Dept: OBGYN CLINIC | Age: 58
End: 2023-07-17
Payer: COMMERCIAL

## 2023-07-17 ENCOUNTER — HOSPITAL ENCOUNTER (OUTPATIENT)
Dept: WOMENS IMAGING | Age: 58
Discharge: HOME | End: 2023-07-19
Payer: COMMERCIAL

## 2023-07-17 VITALS
WEIGHT: 167 LBS | SYSTOLIC BLOOD PRESSURE: 120 MMHG | BODY MASS INDEX: 25.31 KG/M2 | DIASTOLIC BLOOD PRESSURE: 78 MMHG | HEIGHT: 68 IN

## 2023-07-17 DIAGNOSIS — Z12.31 SCREENING MAMMOGRAM FOR BREAST CANCER: ICD-10-CM

## 2023-07-17 DIAGNOSIS — Z12.31 ENCOUNTER FOR SCREENING MAMMOGRAM FOR MALIGNANT NEOPLASM OF BREAST: ICD-10-CM

## 2023-07-17 DIAGNOSIS — Z01.419 ENCOUNTER FOR WELL WOMAN EXAM WITH ROUTINE GYNECOLOGICAL EXAM: Primary | ICD-10-CM

## 2023-07-17 PROCEDURE — 77063 BREAST TOMOSYNTHESIS BI: CPT

## 2023-07-17 PROCEDURE — 99396 PREV VISIT EST AGE 40-64: CPT | Performed by: OBSTETRICS & GYNECOLOGY

## 2023-07-17 RX ORDER — CONJUGATED ESTROGENS 0.62 MG/G
CREAM VAGINAL
Qty: 30 G | Refills: 3 | Status: SHIPPED | OUTPATIENT
Start: 2023-07-17

## 2023-07-17 ASSESSMENT — ENCOUNTER SYMPTOMS
CONSTIPATION: 0
ALLERGIC/IMMUNOLOGIC NEGATIVE: 1
ANAL BLEEDING: 0
ABDOMINAL DISTENTION: 0
DIARRHEA: 0
BLOOD IN STOOL: 0
EYES NEGATIVE: 1
RESPIRATORY NEGATIVE: 1
ABDOMINAL PAIN: 0
NAUSEA: 0
VOMITING: 0
RECTAL PAIN: 0

## 2023-07-17 ASSESSMENT — VISUAL ACUITY: OU: 1

## 2023-07-17 NOTE — PROGRESS NOTES
The patient was asked if she would like a chaperone present for her intimate exam. She  Declined the chaperone.  Willi Bustillos CMA ([facility])
appetite change, chills, diaphoresis, fatigue, fever and unexpected weight change. HENT: Negative. Eyes: Negative. Respiratory: Negative. Cardiovascular: Negative. Gastrointestinal:  Negative for abdominal distention, abdominal pain, anal bleeding, blood in stool, constipation, diarrhea, nausea, rectal pain and vomiting. Endocrine: Negative. Genitourinary:  Negative for decreased urine volume, difficulty urinating, dyspareunia, dysuria, enuresis, flank pain, frequency, genital sores, hematuria, menstrual problem, pelvic pain, urgency, vaginal bleeding, vaginal discharge and vaginal pain. Musculoskeletal: Negative. Skin: Negative. Allergic/Immunologic: Negative. Neurological: Negative. Hematological: Negative. Psychiatric/Behavioral: Negative. Objective:     Physical Exam  Constitutional:       Appearance: She is well-developed. HENT:      Head: Normocephalic. Eyes:      General: Lids are normal. Vision grossly intact. Neck:      Thyroid: No thyromegaly. Cardiovascular:      Rate and Rhythm: Normal rate and regular rhythm. Heart sounds: Normal heart sounds. Pulmonary:      Effort: Pulmonary effort is normal. No respiratory distress. Breath sounds: Normal breath sounds. No wheezing or rales. Chest:      Chest wall: No tenderness. Breasts:     Right: Normal. No swelling, bleeding, inverted nipple, mass, nipple discharge, skin change or tenderness. Left: Normal. No swelling, bleeding, inverted nipple, mass, nipple discharge, skin change or tenderness. Abdominal:      General: There is no distension. Palpations: Abdomen is soft. There is no mass. Tenderness: There is no abdominal tenderness. There is no guarding or rebound. Hernia: No hernia is present. There is no hernia in the left inguinal area or right inguinal area. Genitourinary:     General: Normal vulva. Pubic Area: No rash.        Labia:         Right: No rash,

## 2023-07-20 ENCOUNTER — TELEPHONE (OUTPATIENT)
Dept: OBGYN CLINIC | Age: 58
End: 2023-07-20

## 2023-07-20 RX ORDER — ESTRADIOL 0.1 MG/G
CREAM VAGINAL
Qty: 42.5 G | Refills: 3 | Status: SHIPPED | OUTPATIENT
Start: 2023-07-20

## 2023-07-21 DIAGNOSIS — J45.30 MILD PERSISTENT ASTHMA WITHOUT COMPLICATION: ICD-10-CM

## 2023-07-21 DIAGNOSIS — I10 ESSENTIAL HYPERTENSION: ICD-10-CM

## 2023-07-21 DIAGNOSIS — E87.6 HYPOKALEMIA: ICD-10-CM

## 2023-07-21 DIAGNOSIS — E03.9 ACQUIRED HYPOTHYROIDISM: ICD-10-CM

## 2023-07-21 RX ORDER — LEVOTHYROXINE SODIUM 0.07 MG/1
75 TABLET ORAL DAILY
Qty: 90 TABLET | Refills: 3 | Status: SHIPPED | OUTPATIENT
Start: 2023-07-21 | End: 2023-07-23 | Stop reason: SDUPTHER

## 2023-07-21 RX ORDER — POTASSIUM CHLORIDE 20 MEQ/1
TABLET, EXTENDED RELEASE ORAL
Qty: 30 TABLET | Refills: 2 | Status: SHIPPED | OUTPATIENT
Start: 2023-07-21

## 2023-07-21 RX ORDER — MONTELUKAST SODIUM 10 MG/1
10 TABLET ORAL NIGHTLY
Qty: 90 TABLET | Refills: 3 | Status: SHIPPED | OUTPATIENT
Start: 2023-07-21

## 2023-07-21 RX ORDER — HYDROCHLOROTHIAZIDE 25 MG/1
25 TABLET ORAL EVERY MORNING
Qty: 90 TABLET | Refills: 3 | Status: SHIPPED | OUTPATIENT
Start: 2023-07-21

## 2023-07-21 NOTE — TELEPHONE ENCOUNTER
Rx request   Requested Prescriptions     Pending Prescriptions Disp Refills    levothyroxine (SYNTHROID) 75 MCG tablet 90 tablet 3     Sig: Take 1 tablet by mouth daily    potassium chloride (KLOR-CON M20) 20 MEQ extended release tablet 30 tablet 2     Sig: TAKE 1 TABLET BY MOUTH TWICE A WEEK    montelukast (SINGULAIR) 10 MG tablet 90 tablet 3     Sig: Take 1 tablet by mouth nightly    hydroCHLOROthiazide (HYDRODIURIL) 25 MG tablet 90 tablet 3     Sig: Take 1 tablet by mouth every morning     LOV 7/3/2023  Next Visit Date:  Future Appointments   Date Time Provider [address] [place] [address]   8/4/2023  8:30 AM Georgie Daen PSYD [facility] [place] [facility] [address]   8/18/2023 10:30 AM MATI Ruiz AVN [facility] [address]   9/7/2023  9:00 AM [facility]   10/6/2023  9:45 AM Damien Duque MD [facility] Neurology -

## 2023-07-23 DIAGNOSIS — E03.9 ACQUIRED HYPOTHYROIDISM: ICD-10-CM

## 2023-07-23 RX ORDER — LEVOTHYROXINE SODIUM 0.07 MG/1
75 TABLET ORAL DAILY
Qty: 90 TABLET | Refills: 3 | Status: SHIPPED | OUTPATIENT
Start: 2023-07-23

## 2023-08-02 ENCOUNTER — TELEPHONE (OUTPATIENT)
Dept: FAMILY MEDICINE CLINIC | Age: 58
End: 2023-08-02

## 2023-08-02 DIAGNOSIS — Z00.00 PREVENTATIVE HEALTH CARE: ICD-10-CM

## 2023-08-02 RX ORDER — FOLIC ACID 1 MG/1
1000 TABLET ORAL DAILY
Qty: 90 TABLET | Refills: 3 | Status: SHIPPED | OUTPATIENT
Start: 2023-08-02

## 2023-08-02 NOTE — TELEPHONE ENCOUNTER
----- Message from Obdulia Bowie sent at 8/2/2023  2:11 PM EDT -----  Subject: Message to Provider    QUESTIONS  Information for Provider? IMPORTANT? [employer] [employer] needs an Rx for   90-day supply of 1 mg Folic Acid with up to 3 refills. Send to [facility] [address] [address] [facility], [address]. Fax [phone]. Phone   [phone]  ---------------------------------------------------------------------------  --------------  [address] [place]  [phone]; Do not leave any message, patient will call back for answer  ---------------------------------------------------------------------------  --------------  SCRIPT ANSWERS  Relationship to Patient? Covered Entity  Covered Entity Type? Pharmacy? Representative Name?  [facility], Lori Akers

## 2023-08-18 ENCOUNTER — INITIAL CONSULT (OUTPATIENT)
Age: 58
End: 2023-08-18
Payer: COMMERCIAL

## 2023-08-18 VITALS — WEIGHT: 165 LBS | BODY MASS INDEX: 25.01 KG/M2 | HEIGHT: 68 IN | TEMPERATURE: 97.1 F

## 2023-08-18 DIAGNOSIS — B35.1 DERMATOPHYTOSIS OF NAIL: ICD-10-CM

## 2023-08-18 DIAGNOSIS — M79.675 PAIN IN TOES OF BOTH FEET: Primary | ICD-10-CM

## 2023-08-18 DIAGNOSIS — M79.674 PAIN IN TOES OF BOTH FEET: Primary | ICD-10-CM

## 2023-08-18 PROCEDURE — 99203 OFFICE O/P NEW LOW 30 MIN: CPT | Performed by: PODIATRIST

## 2023-08-19 ASSESSMENT — ENCOUNTER SYMPTOMS
SHORTNESS OF BREATH: 0
NAUSEA: 0
BACK PAIN: 0
VOMITING: 0

## 2023-09-05 ENCOUNTER — PATIENT MESSAGE (OUTPATIENT)
Dept: OBGYN CLINIC | Age: 58
End: 2023-09-05

## 2023-09-06 NOTE — TELEPHONE ENCOUNTER
From: Elena Duarte  To: Dr. Hector Greenberg: 9/5/2023 9:52 PM EDT  Subject: Premarin Q    Hi, Found out my insurance won't cover Premarin, but they do cover estradiol vaginal tablets at the dosage of 10 mcg. Could you send that in as a prescription to [facility]? Thank you for your help!

## 2023-09-07 ENCOUNTER — OFFICE VISIT (OUTPATIENT)
Dept: PRIMARY CARE CLINIC | Age: 58
End: 2023-09-07
Payer: COMMERCIAL

## 2023-09-07 VITALS
RESPIRATION RATE: 18 BRPM | HEART RATE: 68 BPM | TEMPERATURE: 97.8 F | DIASTOLIC BLOOD PRESSURE: 68 MMHG | OXYGEN SATURATION: 98 % | WEIGHT: 167 LBS | BODY MASS INDEX: 25.31 KG/M2 | HEIGHT: 68 IN | SYSTOLIC BLOOD PRESSURE: 124 MMHG

## 2023-09-07 DIAGNOSIS — F43.23 ADJUSTMENT DISORDER WITH MIXED ANXIETY AND DEPRESSED MOOD: ICD-10-CM

## 2023-09-07 DIAGNOSIS — G62.9 SMALL FIBER NEUROPATHY: Primary | ICD-10-CM

## 2023-09-07 PROCEDURE — 99214 OFFICE O/P EST MOD 30 MIN: CPT | Performed by: INTERNAL MEDICINE

## 2023-09-07 RX ORDER — ESTRADIOL 10 UG/1
INSERT VAGINAL
Qty: 24 TABLET | Refills: 2 | Status: SHIPPED | OUTPATIENT
Start: 2023-09-07

## 2023-09-07 RX ORDER — CITALOPRAM HYDROBROMIDE 10 MG/1
10 TABLET ORAL DAILY
Qty: 30 TABLET | Refills: 3 | Status: SHIPPED | OUTPATIENT
Start: 2023-09-07

## 2023-09-07 RX ORDER — GABAPENTIN 100 MG/1
CAPSULE ORAL
Qty: 630 CAPSULE | Refills: 3 | Status: SHIPPED | OUTPATIENT
Start: 2023-09-07 | End: 2023-12-08

## 2023-09-07 ASSESSMENT — ENCOUNTER SYMPTOMS
ABDOMINAL PAIN: 0
VOMITING: 0
WHEEZING: 0
NAUSEA: 0
DIARRHEA: 0
SHORTNESS OF BREATH: 0
COUGH: 0

## 2023-09-07 NOTE — PROGRESS NOTES
Subjective:      Patient ID: Devora Rene. Tayla Roy is a 66 y.o. female    Follow up  HPI  Pt presents or follow up regarding neuropathy. Attributed to toxins and alcohol. Initially well controlled on gabapentin until 1 month ago when burning foot pain resurged with assoc numbness. Attests to increased sadness and anxiety due to recurrent tension and arguments with her spouse due to his noncompliance with his meds. No SI or hallucinations.     Past Medical History:   Diagnosis Date    Allergic rhinitis     Allergies     Asthma     Closed dislocation of finger of left hand 2018    Dermoid tumor     Essential hypertension 5/15/2018    Hyperthyroidism     Thyroid disease      Past Surgical History:   Procedure Laterality Date    LAPAROSCOPY Right 10/20/2021    LAPARSCOPIC ADNEXAL MASS REMOVAL WITH  RIGHT SALPINGO OOPHORECTOMY; INTRAUTERINE DEVICE REMOVAL performed by Jo Fierro MD at [address] Bilateral 2021    MANDIBLE SURGERY      OVARY REMOVAL Right 10/2021    due to cyst    VENTRAL HERNIA REPAIR N/A 9/15/2022    VENTRAL HERNIA REPAIR WITH MESH performed by Miladis Jacobson MD at [address] [facility] History     Socioeconomic History    Marital status:      Spouse name: Nehal Talley    Number of children: 0    Years of education: 23    Highest education level: Professional school degree (e.g., MD, DDS, DVM, VIVIENNE)   Occupational History    Occupation: retired   Tobacco Use    Smoking status: Former     Packs/day: 0.25     Years: 5.00     Pack years: 1.25     Types: Cigarettes     Quit date: 1990     Years since quittin.1    Smokeless tobacco: Never   Vaping Use    Vaping Use: Never used   Substance and Sexual Activity    Alcohol use: Not Currently    Drug use: Not Currently    Sexual activity: Yes     Partners: Male     Comment:  and monogamous   Other Topics Concern    Not on file   Social History Narrative    Not on file     Social Determinants of Health     Financial Resource

## 2023-09-13 ENCOUNTER — PATIENT MESSAGE (OUTPATIENT)
Dept: OBGYN CLINIC | Age: 58
End: 2023-09-13

## 2023-09-13 RX ORDER — ESTRADIOL 10 UG/1
INSERT VAGINAL
Qty: 24 TABLET | Refills: 2 | Status: SHIPPED | OUTPATIENT
Start: 2023-09-13

## 2023-09-13 NOTE — TELEPHONE ENCOUNTER
From: Gricel Duarte  To: Dr. Fredy Villeda: 9/13/2023 9:35 AM EDT  Subject: Estradiol RX    Hi, Thank you for sending in a prescription for estradiol. Could you send it to [facility]?  It's much cheaper through them as opposed to [facility]. Thank you for everything

## 2023-09-19 ENCOUNTER — OFFICE VISIT (OUTPATIENT)
Dept: PRIMARY CARE CLINIC | Age: 58
End: 2023-09-19
Payer: COMMERCIAL

## 2023-09-19 VITALS
TEMPERATURE: 97.3 F | HEIGHT: 68 IN | HEART RATE: 69 BPM | SYSTOLIC BLOOD PRESSURE: 134 MMHG | OXYGEN SATURATION: 96 % | DIASTOLIC BLOOD PRESSURE: 68 MMHG | RESPIRATION RATE: 18 BRPM | BODY MASS INDEX: 25.61 KG/M2 | WEIGHT: 169 LBS

## 2023-09-19 DIAGNOSIS — L98.8 PERIORAL WRINKLES: ICD-10-CM

## 2023-09-19 DIAGNOSIS — F43.23 ADJUSTMENT DISORDER WITH MIXED ANXIETY AND DEPRESSED MOOD: Primary | ICD-10-CM

## 2023-09-19 PROCEDURE — 99214 OFFICE O/P EST MOD 30 MIN: CPT | Performed by: INTERNAL MEDICINE

## 2023-09-19 ASSESSMENT — ENCOUNTER SYMPTOMS
DIARRHEA: 0
NAUSEA: 0
WHEEZING: 0
VOMITING: 0
ABDOMINAL PAIN: 0
COUGH: 0
SHORTNESS OF BREATH: 0

## 2023-09-26 DIAGNOSIS — L98.8 PERIORAL WRINKLES: ICD-10-CM

## 2023-09-26 RX ORDER — GABAPENTIN 100 MG/1
CAPSULE ORAL
Qty: 630 CAPSULE | Refills: 3 | Status: SHIPPED | OUTPATIENT
Start: 2023-09-26 | End: 2023-09-27 | Stop reason: SDUPTHER

## 2023-09-27 DIAGNOSIS — L98.8 PERIORAL WRINKLES: ICD-10-CM

## 2023-09-27 RX ORDER — GABAPENTIN 100 MG/1
CAPSULE ORAL
Qty: 630 CAPSULE | Refills: 3 | Status: SHIPPED | OUTPATIENT
Start: 2023-09-27 | End: 2023-12-28

## 2023-09-29 DIAGNOSIS — F43.23 ADJUSTMENT DISORDER WITH MIXED ANXIETY AND DEPRESSED MOOD: ICD-10-CM

## 2023-09-29 RX ORDER — CITALOPRAM HYDROBROMIDE 10 MG/1
10 TABLET ORAL DAILY
Qty: 30 TABLET | Refills: 3 | Status: SHIPPED | OUTPATIENT
Start: 2023-09-29

## 2023-10-04 PROBLEM — K43.9 VENTRAL HERNIA WITHOUT OBSTRUCTION OR GANGRENE: Status: RESOLVED | Noted: 2022-09-15 | Resolved: 2023-10-04

## 2023-10-06 ENCOUNTER — OFFICE VISIT (OUTPATIENT)
Dept: NEUROLOGY | Age: 58
End: 2023-10-06
Payer: COMMERCIAL

## 2023-10-06 VITALS
WEIGHT: 172.8 LBS | SYSTOLIC BLOOD PRESSURE: 126 MMHG | DIASTOLIC BLOOD PRESSURE: 62 MMHG | BODY MASS INDEX: 26.27 KG/M2 | HEART RATE: 60 BPM

## 2023-10-06 DIAGNOSIS — R20.2 DISTAL PARESTHESIA: ICD-10-CM

## 2023-10-06 DIAGNOSIS — G62.9 SMALL FIBER NEUROPATHY: Primary | ICD-10-CM

## 2023-10-06 DIAGNOSIS — M79.2 NEUROPATHIC PAIN: ICD-10-CM

## 2023-10-06 PROCEDURE — 99213 OFFICE O/P EST LOW 20 MIN: CPT | Performed by: PSYCHIATRY & NEUROLOGY

## 2023-10-06 ASSESSMENT — ENCOUNTER SYMPTOMS
CHOKING: 0
PHOTOPHOBIA: 0
BACK PAIN: 0
NAUSEA: 0
SHORTNESS OF BREATH: 0
COLOR CHANGE: 0
VOMITING: 0
TROUBLE SWALLOWING: 0

## 2023-10-06 NOTE — PROGRESS NOTES
and Sexual Activity    Alcohol use: Not Currently    Drug use: Not Currently    Sexual activity: Yes     Partners: Male     Comment:  and monogamous   Other Topics Concern    Not on file   Social History Narrative    Not on file     Social Determinants of Health     Financial Resource Strain: Low Risk  (3/7/2023)    Overall Financial Resource Strain (CARDIA)     Difficulty of Paying Living Expenses: Not hard at all   Food Insecurity: No Food Insecurity (3/7/2023)    Hunger Vital Sign     Worried About Running Out of Food in the Last Year: Never true     [address] in the Last Year: Never true   Transportation Needs: Unknown (3/7/2023)    PRAPARE - Transportation     Lack of Transportation (Medical): Not on file     Lack of Transportation (Non-Medical):  No   Physical Activity: Not on file   Stress: Not on file   Social Connections: Not on file   Intimate Partner Violence: Not on file   Housing Stability: Unknown (3/7/2023)    Housing Stability Vital Sign     Unable to Pay for Housing in the Last Year: Not on file     Number of Places Lived in the Last Year: Not on file     Unstable Housing in the Last Year: No     Family History   Problem Relation Age of Onset    Anemia Mother     Asthma Mother     Obesity Mother     Alcohol Abuse Neg Hx     Allergy (Severe) Neg Hx     Arthritis Neg Hx     Arrhythmia Neg Hx     Atrial Fibrillation Neg Hx     Birth Defects Neg Hx     Breast Cancer Neg Hx     Cancer Neg Hx     Coronary Art Dis Neg Hx     Colon Cancer Neg Hx     Depression Neg Hx     Diabetes Neg Hx     Early Death Neg Hx     Hearing Loss Neg Hx     Heart Attack Neg Hx     Heart Disease Neg Hx     High Blood Pressure Neg Hx     High Cholesterol Neg Hx     Kidney Disease Neg Hx     Learning Disabilities Neg Hx     Mental Illness Neg Hx     Mental Retardation Neg Hx     Miscarriages / Stillbirths Neg Hx     Osteoporosis Neg Hx     Prostate Cancer Neg Hx     Stroke Neg Hx     Substance Abuse Neg Hx     Vision
Occupational therapy/Nursing/Physical therapy

## 2023-10-17 ENCOUNTER — OFFICE VISIT (OUTPATIENT)
Dept: PRIMARY CARE CLINIC | Age: 58
End: 2023-10-17
Payer: COMMERCIAL

## 2023-10-17 VITALS
TEMPERATURE: 97.3 F | DIASTOLIC BLOOD PRESSURE: 72 MMHG | OXYGEN SATURATION: 98 % | BODY MASS INDEX: 26.46 KG/M2 | RESPIRATION RATE: 18 BRPM | HEIGHT: 68 IN | WEIGHT: 174.6 LBS | SYSTOLIC BLOOD PRESSURE: 124 MMHG | HEART RATE: 65 BPM

## 2023-10-17 DIAGNOSIS — F43.23 ADJUSTMENT DISORDER WITH MIXED ANXIETY AND DEPRESSED MOOD: Primary | ICD-10-CM

## 2023-10-17 PROCEDURE — 99213 OFFICE O/P EST LOW 20 MIN: CPT | Performed by: INTERNAL MEDICINE

## 2023-10-17 RX ORDER — CITALOPRAM 20 MG/1
20 TABLET ORAL DAILY
Qty: 90 TABLET | Refills: 1 | Status: SHIPPED | OUTPATIENT
Start: 2023-10-17 | End: 2023-10-21 | Stop reason: SDUPTHER

## 2023-10-17 ASSESSMENT — ENCOUNTER SYMPTOMS
VOMITING: 0
WHEEZING: 0
SHORTNESS OF BREATH: 0
COUGH: 0
ABDOMINAL PAIN: 0
NAUSEA: 0
DIARRHEA: 0

## 2023-10-17 NOTE — PROGRESS NOTES
fiber neuropathy    Distal paresthesia    Neuropathic pain     Current Outpatient Medications on File Prior to Visit   Medication Sig Dispense Refill    gabapentin (NEURONTIN) 100 MG capsule TAKE 2 CAPSULES BY MOUTH IN THE MORNING AND AFTERNOON AND 3 CAPSULES AT NIGHT 630 capsule 3    tretinoin (RETIN-A) 0.025 % cream Apply topically nightly. 1 each 1    Estradiol (VAGIFEM) 10 MCG TABS vaginal tablet 1 tablet vaginally daily for 2 weeks. Then 1 table vaginally twice weekly. 24 tablet 2    ciclopirox (PENLAC) 8 % solution APPLY TO AFFECTED AREA AT NIGHT      folic acid (FOLVITE) 1 MG tablet Take 1 tablet by mouth daily 90 tablet 3    levothyroxine (SYNTHROID) 75 MCG tablet Take 1 tablet by mouth daily 90 tablet 3    montelukast (SINGULAIR) 10 MG tablet Take 1 tablet by mouth nightly 90 tablet 3    estradiol (ESTRACE VAGINAL) 0.1 MG/GM vaginal cream Place 0.5g vaginally 2 to 3 times weekly. 42.5 g 3    loratadine (CLARITIN) 10 MG tablet Take 1 tablet by mouth daily 90 tablet 3    thiamine 100 MG tablet Take 1 tablet by mouth daily 90 tablet 3    MULTIPLE VITAMIN PO Take by mouth      Alpha-Lipoic Acid 600 MG TABS Take by mouth      L-Glutathione SHANELL by Does not apply route      TURMERIC CURCUMIN PO Take by mouth      MILK THISTLE PO Take by mouth      Glucosamine-Chondroit-Vit C-Mn (GLUCOSAMINE 1500 COMPLEX PO) Take by mouth      Cholecalciferol (VITAMIN D) 50 MCG (2000 UT) CAPS capsule Take by mouth      Omega-3 Fatty Acids (FISH OIL) 1000 MG capsule Take by mouth daily      albuterol sulfate HFA (PROVENTIL;VENTOLIN;PROAIR) 108 (90 Base) MCG/ACT inhaler Inhale 2 puffs into the lungs every 4 hours as needed for Wheezing or Shortness of Breath 1 each 5    mometasone (ELOCON) 0.1 % cream APPLY TO AFFECTED AREA TOPICALLY EVERY DAY 15 g 2    clotrimazole-betamethasone (LOTRISONE) 1-0.05 % cream Apply topically 2 times daily.  1 each 0    magnesium oxide (MAG-OX) 400 MG tablet TAKE 1 TABLET BY MOUTH EVERY DAY 90 tablet 3

## 2023-10-31 ENCOUNTER — OFFICE VISIT (OUTPATIENT)
Dept: PRIMARY CARE CLINIC | Age: 58
End: 2023-10-31
Payer: COMMERCIAL

## 2023-10-31 VITALS
HEART RATE: 81 BPM | DIASTOLIC BLOOD PRESSURE: 74 MMHG | WEIGHT: 174.2 LBS | BODY MASS INDEX: 26.4 KG/M2 | TEMPERATURE: 97.7 F | RESPIRATION RATE: 18 BRPM | SYSTOLIC BLOOD PRESSURE: 124 MMHG | HEIGHT: 68 IN | OXYGEN SATURATION: 98 %

## 2023-10-31 DIAGNOSIS — Z12.11 SPECIAL SCREENING FOR MALIGNANT NEOPLASMS, COLON: ICD-10-CM

## 2023-10-31 DIAGNOSIS — F43.23 ADJUSTMENT DISORDER WITH MIXED ANXIETY AND DEPRESSED MOOD: Primary | ICD-10-CM

## 2023-10-31 PROCEDURE — 99213 OFFICE O/P EST LOW 20 MIN: CPT | Performed by: INTERNAL MEDICINE

## 2023-10-31 ASSESSMENT — ENCOUNTER SYMPTOMS
SHORTNESS OF BREATH: 0
VOMITING: 0
NAUSEA: 0
ABDOMINAL PAIN: 0
COUGH: 0
DIARRHEA: 0
WHEEZING: 0

## 2023-10-31 NOTE — PROGRESS NOTES
Subjective:      Patient ID: Jeremiah Lemus is a 66 y.o. female    Anxiety and depression f/u  HPI  Pt presents for follow up regarding management of anxiety and depression. Celexa tapered off (due to drowsiness) and Wellbutrin started 2 weeks ago. Depression and anxiety are both well controlled. Attests to adequate sleep, energy and appetite. No suicidal ideations or hallucinations. Her 25-year estranged mother just  3 weeks ago.     Past Medical History:   Diagnosis Date    Allergic rhinitis     Allergies     Asthma     Closed dislocation of finger of left hand 2018    Dermoid tumor     Essential hypertension 5/15/2018    Hyperthyroidism     Thyroid disease      Past Surgical History:   Procedure Laterality Date    LAPAROSCOPY Right 10/20/2021    LAPARSCOPIC ADNEXAL MASS REMOVAL WITH  RIGHT SALPINGO OOPHORECTOMY; INTRAUTERINE DEVICE REMOVAL performed by Quinton Lane MD at [address] [facility] Bilateral 2021    MANDIBLE SURGERY      OVARY REMOVAL Right 10/2021    due to cyst    VENTRAL HERNIA REPAIR N/A 9/15/2022    VENTRAL HERNIA REPAIR WITH MESH performed by Serena Hodges MD at [address] [facility] History     Socioeconomic History    Marital status:      Spouse name: Keenan Tai    Number of children: 0    Years of education: 23    Highest education level: Professional school degree (e.g., MD, DDS, DVM, VIVIENNE)   Occupational History    Occupation: retired   Tobacco Use    Smoking status: Former     Packs/day: 0.25     Years: 5.00     Additional pack years: 0.00     Total pack years: 1.25     Types: Cigarettes     Quit date: 1990     Years since quittin.3    Smokeless tobacco: Never   Vaping Use    Vaping Use: Never used   Substance and Sexual Activity    Alcohol use: Not Currently    Drug use: Not Currently    Sexual activity: Yes     Partners: Male     Comment:  and monogamous   Other Topics Concern    Not on file   Social History Narrative    Not on file     Social Determinants

## 2023-11-09 DIAGNOSIS — F43.23 ADJUSTMENT DISORDER WITH MIXED ANXIETY AND DEPRESSED MOOD: ICD-10-CM

## 2023-11-09 RX ORDER — BUPROPION HYDROCHLORIDE 150 MG/1
150 TABLET ORAL EVERY MORNING
Qty: 90 TABLET | Refills: 1 | Status: SHIPPED | OUTPATIENT
Start: 2023-11-09

## 2023-11-15 DIAGNOSIS — Z12.11 SPECIAL SCREENING FOR MALIGNANT NEOPLASMS, COLON: Primary | ICD-10-CM

## 2023-11-17 ENCOUNTER — OFFICE VISIT (OUTPATIENT)
Age: 58
End: 2023-11-17
Payer: COMMERCIAL

## 2023-11-17 VITALS — WEIGHT: 170 LBS | TEMPERATURE: 97.4 F | BODY MASS INDEX: 25.76 KG/M2 | HEIGHT: 68 IN

## 2023-11-17 DIAGNOSIS — B35.1 DERMATOPHYTOSIS OF NAIL: ICD-10-CM

## 2023-11-17 DIAGNOSIS — Z79.899 HIGH RISK MEDICATION USE: ICD-10-CM

## 2023-11-17 DIAGNOSIS — M79.674 PAIN IN TOES OF BOTH FEET: Primary | ICD-10-CM

## 2023-11-17 DIAGNOSIS — M79.675 PAIN IN TOES OF BOTH FEET: Primary | ICD-10-CM

## 2023-11-17 DIAGNOSIS — B35.1 DERMATOPHYTOSIS OF NAIL: Primary | ICD-10-CM

## 2023-11-17 PROCEDURE — 99213 OFFICE O/P EST LOW 20 MIN: CPT | Performed by: PODIATRIST

## 2023-11-17 RX ORDER — TERBINAFINE HYDROCHLORIDE 250 MG/1
250 TABLET ORAL DAILY
Qty: 42 TABLET | Refills: 0 | Status: SHIPPED | OUTPATIENT
Start: 2023-11-17 | End: 2023-12-29

## 2023-11-17 RX ORDER — TERBINAFINE HYDROCHLORIDE 250 MG/1
250 TABLET ORAL DAILY
Qty: 42 TABLET | Refills: 0 | Status: SHIPPED | OUTPATIENT
Start: 2023-11-17 | End: 2023-11-17 | Stop reason: RX

## 2023-11-17 ASSESSMENT — ENCOUNTER SYMPTOMS
SHORTNESS OF BREATH: 0
VOMITING: 0
BACK PAIN: 0
NAUSEA: 0

## 2023-11-17 NOTE — PROGRESS NOTES
[address] [facility] [address]  [address] [address]  Dept: [phone]  Loc: [phone]       Mimi Duarte  (1965)    11/17/23    Subjective     Mimi Murray Lines is 66 y.o. female who complains today of:    Chief Complaint   Patient presents with    Toe Pain     Left 2nd and Right big toe       Toe Pain   Associated symptoms include numbness. HPI: Patient presents for follow-up for onychomycosis of the right big toe and the left second toenail. Patient reports compliance with use of topical ciclopirox, she states she has been using it since December 2022. Patient has also been using a keratolytic nail clarifying gel since her last visit with me. Patient reports incremental improvement, she reports continued loosening of the nail plates and discoloration. Patient states that she would like to attempt use of the oral antifungal medication. Review of Systems   Constitutional:  Negative for chills and fever. HENT:  Negative for hearing loss. Respiratory:  Negative for shortness of breath. Cardiovascular:  Negative for chest pain. Gastrointestinal:  Negative for nausea and vomiting. Genitourinary:  Negative for difficulty urinating. Musculoskeletal:  Negative for back pain and gait problem. Skin:  Negative for wound. Neurological:  Positive for numbness. Hematological:  Does not bruise/bleed easily. Psychiatric/Behavioral:  Negative for sleep disturbance. Allergies:  Patient has no known allergies.     Current Outpatient Medications on File Prior to Visit   Medication Sig Dispense Refill    buPROPion (WELLBUTRIN XL) 150 MG extended release tablet Take 1 tablet by mouth every morning 90 tablet 1    gabapentin (NEURONTIN) 100 MG capsule TAKE 2 CAPSULES BY MOUTH IN THE MORNING AND AFTERNOON AND 3 CAPSULES AT NIGHT 630 capsule 3    tretinoin (RETIN-A) 0.025 % cream Apply topically

## 2023-11-29 ENCOUNTER — OFFICE VISIT (OUTPATIENT)
Dept: PRIMARY CARE CLINIC | Age: 58
End: 2023-11-29
Payer: COMMERCIAL

## 2023-11-29 VITALS
HEIGHT: 68 IN | OXYGEN SATURATION: 97 % | DIASTOLIC BLOOD PRESSURE: 82 MMHG | HEART RATE: 68 BPM | RESPIRATION RATE: 18 BRPM | SYSTOLIC BLOOD PRESSURE: 122 MMHG | WEIGHT: 178.6 LBS | BODY MASS INDEX: 27.07 KG/M2

## 2023-11-29 DIAGNOSIS — F43.23 ADJUSTMENT DISORDER WITH MIXED ANXIETY AND DEPRESSED MOOD: Primary | ICD-10-CM

## 2023-11-29 PROCEDURE — 99213 OFFICE O/P EST LOW 20 MIN: CPT | Performed by: INTERNAL MEDICINE

## 2023-11-29 RX ORDER — BUSPIRONE HYDROCHLORIDE 5 MG/1
5 TABLET ORAL 3 TIMES DAILY PRN
Qty: 30 TABLET | Refills: 1 | Status: SHIPPED | OUTPATIENT
Start: 2023-11-29 | End: 2023-12-29

## 2023-11-29 ASSESSMENT — ENCOUNTER SYMPTOMS
DIARRHEA: 0
COUGH: 0
SHORTNESS OF BREATH: 0
ABDOMINAL PAIN: 0
NAUSEA: 0
WHEEZING: 0
VOMITING: 0

## 2023-11-29 NOTE — PROGRESS NOTES
Cardiovascular:      Rate and Rhythm: Normal rate and regular rhythm. Pulses: Normal pulses. Heart sounds: Normal heart sounds, S1 normal and S2 normal.   Pulmonary:      Effort: Pulmonary effort is normal. No respiratory distress. Breath sounds: Normal breath sounds. No wheezing or rales. Chest:      Chest wall: No tenderness. Abdominal:      General: Bowel sounds are normal.      Tenderness: There is no abdominal tenderness. Neurological:      Mental Status: She is alert. Assessment:       Diagnosis Orders   1. Adjustment disorder with mixed anxiety and depressed mood Controlled busPIRone (BUSPAR) 5 MG tablet          Plan:       Orders Placed This Encounter   Medications    busPIRone (BUSPAR) 5 MG tablet     Sig: Take 1 tablet by mouth 3 times daily as needed (anxiety)     Dispense:  30 tablet     Refill:  1     Return in about 6 months (around 5/29/2024) for follow up, with PCP.

## 2023-12-15 DIAGNOSIS — B35.1 DERMATOPHYTOSIS OF NAIL: ICD-10-CM

## 2023-12-15 DIAGNOSIS — Z79.899 HIGH RISK MEDICATION USE: ICD-10-CM

## 2023-12-15 LAB
ALBUMIN SERPL-MCNC: 4.4 G/DL (ref 3.5–4.6)
ALP SERPL-CCNC: 66 U/L (ref 40–130)
ALT SERPL-CCNC: 10 U/L (ref 0–33)
AST SERPL-CCNC: 24 U/L (ref 0–35)
BILIRUB DIRECT SERPL-MCNC: <0.2 MG/DL (ref 0–0.4)
BILIRUB INDIRECT SERPL-MCNC: NORMAL MG/DL (ref 0–0.6)
BILIRUB SERPL-MCNC: 0.6 MG/DL (ref 0.2–0.7)
PROT SERPL-MCNC: 6.9 G/DL (ref 6.3–8)

## 2023-12-26 ENCOUNTER — OFFICE VISIT (OUTPATIENT)
Dept: PRIMARY CARE | Facility: CLINIC | Age: 58
End: 2023-12-26
Payer: MEDICARE

## 2023-12-26 ENCOUNTER — LAB (OUTPATIENT)
Dept: LAB | Facility: LAB | Age: 58
End: 2023-12-26
Payer: MEDICARE

## 2023-12-26 ENCOUNTER — TELEPHONE (OUTPATIENT)
Dept: PAIN MANAGEMENT | Age: 58
End: 2023-12-26

## 2023-12-26 VITALS
SYSTOLIC BLOOD PRESSURE: 127 MMHG | DIASTOLIC BLOOD PRESSURE: 88 MMHG | RESPIRATION RATE: 20 BRPM | OXYGEN SATURATION: 98 % | HEART RATE: 67 BPM | HEIGHT: 68 IN | WEIGHT: 178 LBS | BODY MASS INDEX: 26.98 KG/M2 | TEMPERATURE: 97.8 F

## 2023-12-26 DIAGNOSIS — Z83.3 FAMILY HISTORY OF DIABETES MELLITUS (DM): ICD-10-CM

## 2023-12-26 DIAGNOSIS — Z00.00 ENCOUNTER FOR HEALTH MAINTENANCE EXAMINATION: ICD-10-CM

## 2023-12-26 DIAGNOSIS — F41.9 ANXIETY: ICD-10-CM

## 2023-12-26 DIAGNOSIS — E53.8 VITAMIN B12 DEFICIENCY: ICD-10-CM

## 2023-12-26 DIAGNOSIS — E78.5 HYPERLIPIDEMIA, UNSPECIFIED HYPERLIPIDEMIA TYPE: ICD-10-CM

## 2023-12-26 DIAGNOSIS — E03.9 HYPOTHYROIDISM, UNSPECIFIED TYPE: ICD-10-CM

## 2023-12-26 DIAGNOSIS — E55.9 VITAMIN D DEFICIENCY: ICD-10-CM

## 2023-12-26 DIAGNOSIS — Z00.00 ENCOUNTER FOR HEALTH MAINTENANCE EXAMINATION: Primary | ICD-10-CM

## 2023-12-26 LAB
25(OH)D3 SERPL-MCNC: 32 NG/ML (ref 30–100)
ALBUMIN SERPL BCP-MCNC: 4.4 G/DL (ref 3.4–5)
ALP SERPL-CCNC: 53 U/L (ref 33–110)
ALT SERPL W P-5'-P-CCNC: 11 U/L (ref 7–45)
ANION GAP SERPL CALC-SCNC: 12 MMOL/L (ref 10–20)
AST SERPL W P-5'-P-CCNC: 19 U/L (ref 9–39)
BILIRUB SERPL-MCNC: 0.8 MG/DL (ref 0–1.2)
BUN SERPL-MCNC: 13 MG/DL (ref 6–23)
CALCIUM SERPL-MCNC: 9.4 MG/DL (ref 8.6–10.3)
CHLORIDE SERPL-SCNC: 101 MMOL/L (ref 98–107)
CHOLEST SERPL-MCNC: 223 MG/DL (ref 0–199)
CHOLESTEROL/HDL RATIO: 3.3
CO2 SERPL-SCNC: 28 MMOL/L (ref 21–32)
CREAT SERPL-MCNC: 1 MG/DL (ref 0.5–1.05)
ERYTHROCYTE [DISTWIDTH] IN BLOOD BY AUTOMATED COUNT: 13.9 % (ref 11.5–14.5)
EST. AVERAGE GLUCOSE BLD GHB EST-MCNC: 114 MG/DL
GFR SERPL CREATININE-BSD FRML MDRD: 66 ML/MIN/1.73M*2
GLUCOSE SERPL-MCNC: 106 MG/DL (ref 74–99)
HBA1C MFR BLD: 5.6 %
HCT VFR BLD AUTO: 38.5 % (ref 36–46)
HDLC SERPL-MCNC: 67.8 MG/DL
HGB BLD-MCNC: 12.7 G/DL (ref 12–16)
LDLC SERPL CALC-MCNC: 139 MG/DL
MCH RBC QN AUTO: 28.6 PG (ref 26–34)
MCHC RBC AUTO-ENTMCNC: 33 G/DL (ref 32–36)
MCV RBC AUTO: 87 FL (ref 80–100)
NON HDL CHOLESTEROL: 155 MG/DL (ref 0–149)
NRBC BLD-RTO: 0 /100 WBCS (ref 0–0)
PLATELET # BLD AUTO: 244 X10*3/UL (ref 150–450)
POTASSIUM SERPL-SCNC: 4.6 MMOL/L (ref 3.5–5.3)
PROT SERPL-MCNC: 6.9 G/DL (ref 6.4–8.2)
RBC # BLD AUTO: 4.44 X10*6/UL (ref 4–5.2)
SODIUM SERPL-SCNC: 136 MMOL/L (ref 136–145)
TRIGL SERPL-MCNC: 83 MG/DL (ref 0–149)
TSH SERPL-ACNC: 3.54 MIU/L (ref 0.44–3.98)
VIT B12 SERPL-MCNC: 1030 PG/ML (ref 211–911)
VLDL: 17 MG/DL (ref 0–40)
WBC # BLD AUTO: 4.3 X10*3/UL (ref 4.4–11.3)

## 2023-12-26 PROCEDURE — 83036 HEMOGLOBIN GLYCOSYLATED A1C: CPT

## 2023-12-26 PROCEDURE — 99386 PREV VISIT NEW AGE 40-64: CPT | Performed by: FAMILY MEDICINE

## 2023-12-26 PROCEDURE — 82607 VITAMIN B-12: CPT

## 2023-12-26 PROCEDURE — 80061 LIPID PANEL: CPT

## 2023-12-26 PROCEDURE — 82306 VITAMIN D 25 HYDROXY: CPT

## 2023-12-26 PROCEDURE — 85027 COMPLETE CBC AUTOMATED: CPT

## 2023-12-26 PROCEDURE — 84443 ASSAY THYROID STIM HORMONE: CPT

## 2023-12-26 PROCEDURE — 36415 COLL VENOUS BLD VENIPUNCTURE: CPT

## 2023-12-26 PROCEDURE — 80053 COMPREHEN METABOLIC PANEL: CPT

## 2023-12-26 RX ORDER — ALBUTEROL SULFATE 90 UG/1
AEROSOL, METERED RESPIRATORY (INHALATION) EVERY 4 HOURS
COMMUNITY

## 2023-12-26 RX ORDER — LORATADINE 10 MG/1
10 TABLET ORAL DAILY
COMMUNITY

## 2023-12-26 RX ORDER — LANOLIN ALCOHOL/MO/W.PET/CERES
400 CREAM (GRAM) TOPICAL
COMMUNITY
Start: 2020-08-31

## 2023-12-26 RX ORDER — MONTELUKAST SODIUM 10 MG/1
10 TABLET ORAL
COMMUNITY
Start: 2019-07-29

## 2023-12-26 RX ORDER — TRETINOIN 0.25 MG/G
CREAM TOPICAL
COMMUNITY
Start: 2023-10-06

## 2023-12-26 RX ORDER — GLUCOSAM/CHONDRO/HERB 149/HYAL 750-100 MG
1 TABLET ORAL EVERY 24 HOURS
COMMUNITY

## 2023-12-26 RX ORDER — GABAPENTIN 100 MG/1
CAPSULE ORAL EVERY 8 HOURS
COMMUNITY

## 2023-12-26 RX ORDER — LANOLIN ALCOHOL/MO/W.PET/CERES
100 CREAM (GRAM) TOPICAL DAILY
COMMUNITY
Start: 2023-12-03

## 2023-12-26 RX ORDER — BENZOCAINE .13; .15; .5; 2 G/100G; G/100G; G/100G; G/100G
1 GEL ORAL
COMMUNITY

## 2023-12-26 RX ORDER — FLUTICASONE PROPIONATE AND SALMETEROL 100; 50 UG/1; UG/1
1 POWDER RESPIRATORY (INHALATION)
COMMUNITY
Start: 2020-02-09

## 2023-12-26 RX ORDER — ALPHA LIPOIC ACID 600 MG
TABLET ORAL EVERY 24 HOURS
COMMUNITY

## 2023-12-26 RX ORDER — FOLIC ACID 1 MG/1
TABLET ORAL EVERY 24 HOURS
COMMUNITY
Start: 2019-06-21

## 2023-12-26 RX ORDER — CHOLECALCIFEROL (VITAMIN D3) 50 MCG
1 TABLET ORAL EVERY 24 HOURS
COMMUNITY

## 2023-12-26 RX ORDER — LEVOTHYROXINE SODIUM 75 UG/1
75 TABLET ORAL EVERY 24 HOURS
COMMUNITY
End: 2023-12-27 | Stop reason: SDUPTHER

## 2023-12-26 RX ORDER — ESTRADIOL 10 UG/1
INSERT VAGINAL
COMMUNITY
Start: 2023-11-15

## 2023-12-26 RX ORDER — TERBINAFINE HYDROCHLORIDE 250 MG/1
TABLET ORAL
COMMUNITY
Start: 2023-12-18

## 2023-12-26 RX ORDER — BUSPIRONE HYDROCHLORIDE 5 MG/1
TABLET ORAL
COMMUNITY
Start: 2023-11-29

## 2023-12-26 RX ORDER — BACITRACIN 500 UNIT/G
OINTMENT (GRAM) TOPICAL
COMMUNITY

## 2023-12-26 NOTE — TELEPHONE ENCOUNTER
----- Message from Mimi Duarte sent at 12/26/2023  9:41 AM EST -----  Regarding: Terminating refill q  Contact: [phone]  Hi!  Happy day after Hendrix!  Hope your holiday was fun and relaxing.  I was wondering what pharmacy has the refill for terbinafine.  [facility] in [place] says they don't have it yet and that's where I'd like to get it.  Could you call it in to them?  Thanks so much and have a great day!

## 2023-12-26 NOTE — PROGRESS NOTES
"  Subjective   Patient ID: Barbara Yarbrough is a 58 y.o. female who presents for Annual Exam and Establish Care.    HPI   Patient comes in today essentially as a new patient to the practice.  She claims that she was a patient here with her  many years ago.    Patient claims she is anxious but not depressed.  She has been on citalopram but has been off of it for a month.  She is under quite a bit of stress.  She is feeling stressed because of her  and his health issues.  She is feeling stressed because of her own weight gain.  She has neuropathy in both feet for which she takes gabapentin.  She is known hypothyroid and asthmatic both of which are under control with her current medications.  She has ocular migraines.    She sees GYN and claims her mammograms are up-to-date, last one 2 months ago.  She has not yet had a colonoscopy.  Her immunizations are up-to-date.    Review of Systems   All other systems reviewed and are negative.      Objective   /88   Pulse 67   Temp 36.6 °C (97.8 °F)   Resp 20   Ht 1.715 m (5' 7.5\")   Wt 80.7 kg (178 lb)   LMP  (LMP Unknown)   SpO2 98%   BMI 27.47 kg/m²     Physical Exam  Constitutional:       Appearance: She is well-developed.   HENT:      Head: Normocephalic and atraumatic.      Right Ear: Tympanic membrane, ear canal and external ear normal.      Left Ear: Tympanic membrane, ear canal and external ear normal.      Nose: Nose normal.      Mouth/Throat:      Mouth: Mucous membranes are moist.      Pharynx: Oropharynx is clear.   Eyes:      Extraocular Movements: Extraocular movements intact.      Conjunctiva/sclera: Conjunctivae normal.      Pupils: Pupils are equal, round, and reactive to light.   Cardiovascular:      Rate and Rhythm: Normal rate and regular rhythm.      Heart sounds: Normal heart sounds. No murmur heard.  Pulmonary:      Effort: Pulmonary effort is normal.      Breath sounds: Normal breath sounds. No wheezing.   Abdominal:      " Psychotherapy Group Note    PATIENT'S NAME: Joel Pineda  MRN:   [id number]  :   1971  ACCT. NUMBER: [id number]  DATE OF SERVICE: 21  START TIME:  2:00 PM  END TIME:  2:50 PM  FACILITATOR: Lashell Leonard  TOPIC: MH EBP Group: Relationship Skills  [facility] Day Treatment  TRACK: 4b                                      Service Modality:  Video Visit     Telemedicine Visit: The patient's condition can be safely assessed and treated via synchronous audio and visual telemedicine encounter.      Reason for Telemedicine Visit: Services only offered telehealth    Originating Site (Patient Location): Patient's home    Distant Site (Provider Location): Provider Remote Setting- Home Office    Consent:  The patient/guardian has verbally consented to: the potential risks and benefits of telemedicine (video visit) versus in person care; bill my insurance or make self-payment for services provided; and responsibility for payment of non-covered services.     Patient would like the video invitation sent by:  My Chart    Mode of Communication:  Video Conference via Medical Zoom    As the provider I attest to compliance with applicable laws and regulations related to telemedicine.         NUMBER OF PARTICIPANTS: 8    Summary of Group / Topics Discussed:  Relationship Skills: Assertive Communication: Patients were provided with a general overview of assertive communication skills and how practicing assertive communication skills will assist patients in developing healthier and more effective relationships. Patients reviewed their current awareness on ability to practice assertive communication, ways to increase assertive communication, and identified/problem solved barriers to assertive communication.     Patient Session Goals / Objectives:    Identified and discussed patient individual challenges with communication    Presented and practiced effective communication skills in  General: Abdomen is flat. Bowel sounds are normal.      Palpations: Abdomen is soft.   Musculoskeletal:         General: Normal range of motion.   Skin:     General: Skin is warm and dry.   Neurological:      General: No focal deficit present.      Mental Status: She is alert and oriented to person, place, and time. Mental status is at baseline.   Psychiatric:         Mood and Affect: Mood normal.         Behavior: Behavior normal.         Thought Content: Thought content normal.         Judgment: Judgment normal.         Assessment/Plan   Problem List Items Addressed This Visit    None  Visit Diagnoses         Codes    Encounter for health maintenance examination    -  Primary Z00.00    Relevant Orders    Comprehensive Metabolic Panel (Completed)    Vitamin B12 deficiency     E53.8    Relevant Orders    CBC (Completed)    Vitamin B12 (Completed)    Vitamin D deficiency     E55.9    Relevant Orders    Vitamin D 25-Hydroxy,Total (for eval of Vitamin D levels) (Completed)    Hyperlipidemia, unspecified hyperlipidemia type     E78.5    Relevant Orders    Lipid Panel (Completed)    Family history of diabetes mellitus (DM)     Z83.3    Relevant Orders    Hemoglobin A1C (Completed)    Hypothyroidism, unspecified type     E03.9    Relevant Orders    TSH with reflex to Free T4 if abnormal (Completed)    Anxiety     F41.9        Will contact patient with lab results when available.  Continue current meds as directed.  Follow up in 6 months for recheck if all remains stable, sooner if problems arise.  Medication list reconciled.  Thank you for visiting today!      Professional services: Some of this note was completed using Dragon voice technology and sometimes the software misinterprets words. This may include unintended errors with respect to translation of words, typographical errors or grammar errors which may not have been identified prior to finalization of the chart note. Please take this into account when reading the  session    Assisted patients in implementing more effective communication skills in their relationships      Patient Participation / Response:  Fully participated with the group by sharing personal reflections / insights and openly received / provided feedback with other participants.    Demonstrated understanding of topics discussed through group discussion and participation, Demonstrated understanding of relationship skills and communication skills and Identified / Expressed personal readiness to incorporate effective communication skills    Treatment Plan:  Patient has a current master individualized treatment plan.  See Epic treatment plan for more information.    Lashell Leonard      note. Thank you.

## 2023-12-26 NOTE — TELEPHONE ENCOUNTER
Left message for patient informing her that the medication was sent but to a different pharmacy [facility] [address].

## 2023-12-27 DIAGNOSIS — E03.9 HYPOTHYROIDISM, UNSPECIFIED TYPE: Primary | ICD-10-CM

## 2023-12-27 RX ORDER — LEVOTHYROXINE SODIUM 88 UG/1
88 TABLET ORAL EVERY 24 HOURS
Qty: 90 TABLET | Refills: 1 | Status: SHIPPED | OUTPATIENT
Start: 2023-12-27 | End: 2024-06-10 | Stop reason: SDUPTHER

## 2024-01-04 ENCOUNTER — ANESTHESIA EVENT (OUTPATIENT)
Dept: ENDOSCOPY | Age: 59
End: 2024-01-04
Payer: COMMERCIAL

## 2024-01-04 ENCOUNTER — PREP FOR PROCEDURE (OUTPATIENT)
Dept: GASTROENTEROLOGY | Age: 59
End: 2024-01-04

## 2024-01-04 RX ORDER — SODIUM CHLORIDE 9 MG/ML
INJECTION, SOLUTION INTRAVENOUS CONTINUOUS
Status: CANCELLED | OUTPATIENT
Start: 2024-01-04

## 2024-01-04 RX ORDER — SODIUM CHLORIDE 9 MG/ML
INJECTION, SOLUTION INTRAVENOUS PRN
Status: CANCELLED | OUTPATIENT
Start: 2024-01-04

## 2024-01-04 RX ORDER — SODIUM CHLORIDE 0.9 % (FLUSH) 0.9 %
5-40 SYRINGE (ML) INJECTION PRN
Status: CANCELLED | OUTPATIENT
Start: 2024-01-04

## 2024-01-04 RX ORDER — SODIUM CHLORIDE 0.9 % (FLUSH) 0.9 %
5-40 SYRINGE (ML) INJECTION EVERY 12 HOURS SCHEDULED
Status: CANCELLED | OUTPATIENT
Start: 2024-01-04

## 2024-01-04 NOTE — ANESTHESIA PRE PROCEDURE
Provider, MD Jose   Alpha-Lipoic Acid 600 MG TABS Take by mouth    Jose Escobar MD   L-Glutathione SHANELL by Does not apply route    Jose Escobar MD   TURMERIC CURCUMIN PO Take by mouth    Jose Escobar MD   MILK THISTLE PO Take by mouth    Jose Escobar MD   Glucosamine-Chondroit-Vit C-Mn (GLUCOSAMINE 1500 COMPLEX PO) Take by mouth    Jose Escobar MD   Cholecalciferol (VITAMIN D) 50 MCG (2000 UT) CAPS capsule Take by mouth    Jose Escobar MD   Omega-3 Fatty Acids (FISH OIL) 1000 MG capsule Take by mouth daily    Jose Escobar MD   albuterol sulfate HFA (PROVENTIL;VENTOLIN;PROAIR) 108 (90 Base) MCG/ACT inhaler Inhale 2 puffs into the lungs every 4 hours as needed for Wheezing or Shortness of Breath 11/30/22   Aleida Reardon MD   mometasone (ELOCON) 0.1 % cream APPLY TO AFFECTED AREA TOPICALLY EVERY DAY 5/20/22   Aleida Reardon MD   clotrimazole-betamethasone (LOTRISONE) 1-0.05 % cream Apply topically 2 times daily. 12/3/21   Sheeba Pop MD   magnesium oxide (MAG-OX) 400 MG tablet TAKE 1 TABLET BY MOUTH EVERY DAY 12/2/21   Aleida Reardon MD   WIXELA INHUB 100-50 MCG/DOSE diskus inhaler INHALE 1 PUFF AS INSTRUCTED TWICE DAILY. RINSE AND GARGLE MOUTH WITH WATER AFTER EACH USE 4/28/21   Aleida Reardon MD   albuterol (PROVENTIL) (2.5 MG/3ML) 0.083% nebulizer solution Inhale by nebulizer over 5-15 minutes three (3) to four (4) times a day as needed for cough/wheezing/shortness of breath 4/28/21   Aleida Reardon MD       Current medications:    No current facility-administered medications for this encounter.     Current Outpatient Medications   Medication Sig Dispense Refill    terbinafine (LAMISIL) 250 MG tablet Take 1 tablet by mouth daily 42 tablet 0    buPROPion (WELLBUTRIN XL) 150 MG extended release tablet Take 1 tablet by mouth every morning 90 tablet 1    citalopram (CELEXA) 20 MG tablet Take 1 tablet by mouth every other day for 7 days, THEN 1 tablet every 3

## 2024-01-05 ENCOUNTER — PATIENT MESSAGE (OUTPATIENT)
Dept: GASTROENTEROLOGY | Age: 59
End: 2024-01-05

## 2024-01-05 ENCOUNTER — HOSPITAL ENCOUNTER (OUTPATIENT)
Age: 59
Setting detail: OUTPATIENT SURGERY
Discharge: HOME | End: 2024-01-05
Attending: INTERNAL MEDICINE | Admitting: INTERNAL MEDICINE
Payer: COMMERCIAL

## 2024-01-05 ENCOUNTER — ANESTHESIA (OUTPATIENT)
Dept: ENDOSCOPY | Age: 59
End: 2024-01-05
Payer: COMMERCIAL

## 2024-01-05 VITALS
DIASTOLIC BLOOD PRESSURE: 78 MMHG | OXYGEN SATURATION: 98 % | TEMPERATURE: 98 F | RESPIRATION RATE: 18 BRPM | BODY MASS INDEX: 26.98 KG/M2 | HEART RATE: 57 BPM | WEIGHT: 178 LBS | HEIGHT: 68 IN | SYSTOLIC BLOOD PRESSURE: 121 MMHG

## 2024-01-05 DIAGNOSIS — Z12.11 COLON CANCER SCREENING: ICD-10-CM

## 2024-01-05 PROBLEM — K63.5 POLYP OF COLON: Status: ACTIVE | Noted: 2024-01-05

## 2024-01-05 PROCEDURE — 2580000003 HC RX 258: Performed by: INTERNAL MEDICINE

## 2024-01-05 PROCEDURE — 6360000002 HC RX W HCPCS: Performed by: REGISTERED NURSE

## 2024-01-05 PROCEDURE — 7100000011 HC PHASE II RECOVERY - ADDTL 15 MIN: Performed by: INTERNAL MEDICINE

## 2024-01-05 PROCEDURE — 3700000000 HC ANESTHESIA ATTENDED CARE: Performed by: INTERNAL MEDICINE

## 2024-01-05 PROCEDURE — 2709999900 HC NON-CHARGEABLE SUPPLY: Performed by: INTERNAL MEDICINE

## 2024-01-05 PROCEDURE — 45385 COLONOSCOPY W/LESION REMOVAL: CPT | Performed by: INTERNAL MEDICINE

## 2024-01-05 PROCEDURE — 7100000010 HC PHASE II RECOVERY - FIRST 15 MIN: Performed by: INTERNAL MEDICINE

## 2024-01-05 PROCEDURE — 6370000000 HC RX 637 (ALT 250 FOR IP): Performed by: INTERNAL MEDICINE

## 2024-01-05 PROCEDURE — 88305 TISSUE EXAM BY PATHOLOGIST: CPT

## 2024-01-05 PROCEDURE — 3700000001 HC ADD 15 MINUTES (ANESTHESIA): Performed by: INTERNAL MEDICINE

## 2024-01-05 PROCEDURE — 2500000003 HC RX 250 WO HCPCS: Performed by: REGISTERED NURSE

## 2024-01-05 PROCEDURE — 3609027000 HC COLONOSCOPY: Performed by: INTERNAL MEDICINE

## 2024-01-05 RX ORDER — SODIUM CHLORIDE 0.9 % (FLUSH) 0.9 %
5-40 SYRINGE (ML) INJECTION PRN
Status: DISCONTINUED | OUTPATIENT
Start: 2024-01-05 | End: 2024-01-05 | Stop reason: HOSPADM

## 2024-01-05 RX ORDER — PROPOFOL 10 MG/ML
INJECTION, EMULSION INTRAVENOUS PRN
Status: DISCONTINUED | OUTPATIENT
Start: 2024-01-05 | End: 2024-01-05 | Stop reason: SDUPTHER

## 2024-01-05 RX ORDER — SODIUM CHLORIDE 0.9 % (FLUSH) 0.9 %
5-40 SYRINGE (ML) INJECTION EVERY 12 HOURS SCHEDULED
Status: DISCONTINUED | OUTPATIENT
Start: 2024-01-05 | End: 2024-01-05 | Stop reason: HOSPADM

## 2024-01-05 RX ORDER — LIDOCAINE HYDROCHLORIDE 20 MG/ML
INJECTION, SOLUTION INFILTRATION; PERINEURAL PRN
Status: DISCONTINUED | OUTPATIENT
Start: 2024-01-05 | End: 2024-01-05 | Stop reason: SDUPTHER

## 2024-01-05 RX ORDER — MAGNESIUM HYDROXIDE 1200 MG/15ML
LIQUID ORAL PRN
Status: DISCONTINUED | OUTPATIENT
Start: 2024-01-05 | End: 2024-01-05 | Stop reason: ALTCHOICE

## 2024-01-05 RX ORDER — SODIUM CHLORIDE 9 MG/ML
INJECTION, SOLUTION INTRAVENOUS PRN
Status: DISCONTINUED | OUTPATIENT
Start: 2024-01-05 | End: 2024-01-05 | Stop reason: HOSPADM

## 2024-01-05 RX ORDER — SIMETHICONE 20 MG/.3ML
EMULSION ORAL PRN
Status: DISCONTINUED | OUTPATIENT
Start: 2024-01-05 | End: 2024-01-05 | Stop reason: ALTCHOICE

## 2024-01-05 RX ORDER — SODIUM CHLORIDE 9 MG/ML
INJECTION, SOLUTION INTRAVENOUS CONTINUOUS
Status: DISCONTINUED | OUTPATIENT
Start: 2024-01-05 | End: 2024-01-05 | Stop reason: HOSPADM

## 2024-01-05 RX ADMIN — PROPOFOL 50 MG: 10 INJECTION, EMULSION INTRAVENOUS at 08:26

## 2024-01-05 RX ADMIN — SODIUM CHLORIDE: 9 INJECTION, SOLUTION INTRAVENOUS at 07:47

## 2024-01-05 RX ADMIN — PROPOFOL 50 MG: 10 INJECTION, EMULSION INTRAVENOUS at 08:33

## 2024-01-05 RX ADMIN — LIDOCAINE HYDROCHLORIDE 60 MG: 20 INJECTION, SOLUTION INFILTRATION; PERINEURAL at 08:22

## 2024-01-05 RX ADMIN — PROPOFOL 50 MG: 10 INJECTION, EMULSION INTRAVENOUS at 08:29

## 2024-01-05 RX ADMIN — PROPOFOL 50 MG: 10 INJECTION, EMULSION INTRAVENOUS at 08:25

## 2024-01-05 RX ADMIN — PROPOFOL 150 MG: 10 INJECTION, EMULSION INTRAVENOUS at 08:23

## 2024-01-05 RX ADMIN — PROPOFOL 50 MG: 10 INJECTION, EMULSION INTRAVENOUS at 08:35

## 2024-01-05 NOTE — ANESTHESIA POSTPROCEDURE EVALUATION
Department of Anesthesiology  Postprocedure Note    Patient: Mimi Duarte  MRN: [id number]  YOB: 1965  Date of evaluation: 1/5/2024    Procedure Summary       Date: 01/05/24 Room / Location: [facility] OR 01 / [facility]    Anesthesia Start: 0820 Anesthesia Stop: 0851    Procedure: COLONOSCOPY WITH POLYPECTOMY Diagnosis: Colon cancer screening    Surgeons: Wendy Stewart MD Responsible Provider: Evan Mckeon APRN - CRNA    Anesthesia Type: MAC ASA Status: 3            Anesthesia Type: No value filed.    Kim Phase I: Kim Score: 10    Kim Phase II: Kim Score: 10    Anesthesia Post Evaluation    Patient location during evaluation: bedside  Patient participation: complete - patient participated  Level of consciousness: awake and awake and alert  Airway patency: patent  Nausea & Vomiting: no nausea and no vomiting  Cardiovascular status: blood pressure returned to baseline and hemodynamically stable  Respiratory status: acceptable  Hydration status: euvolemic  Pain management: adequate        No notable events documented.

## 2024-01-05 NOTE — H&P
BY MOUTH EVERY DAY 21   Aleida Reardon MD   WIXELA INHUB 100-50 MCG/DOSE diskus inhaler INHALE 1 PUFF AS INSTRUCTED TWICE DAILY. RINSE AND GARGLE MOUTH WITH WATER AFTER EACH USE 21   Aleida Reardon MD   albuterol (PROVENTIL) (2.5 MG/3ML) 0.083% nebulizer solution Inhale by nebulizer over 5-15 minutes three (3) to four (4) times a day as needed for cough/wheezing/shortness of breath 21   Aleida Reardon MD       Allergies: No Known Allergies     History of allergic reaction to anesthesia:  No    Past Medical History:  Past Medical History:   Diagnosis Date    Allergic rhinitis     Allergies     Asthma     Closed dislocation of finger of left hand 2018    Dermoid tumor     Essential hypertension 5/15/2018    Hyperthyroidism     Thyroid disease        Past Surgical History:  Past Surgical History:   Procedure Laterality Date    LAPAROSCOPY Right 10/20/2021    LAPARSCOPIC ADNEXAL MASS REMOVAL WITH  RIGHT SALPINGO OOPHORECTOMY; INTRAUTERINE DEVICE REMOVAL performed by Sheeba Pop MD at [facility]    [facility] Bilateral 2021    MANDIBLE SURGERY      OVARY REMOVAL Right 10/2021    due to cyst    VENTRAL HERNIA REPAIR N/A 9/15/2022    VENTRAL HERNIA REPAIR WITH MESH performed by Rodri Willson MD at [facility] OR       Social History:  Social History     Tobacco Use    Smoking status: Former     Current packs/day: 0.00     Average packs/day: 0.3 packs/day for 5.0 years (1.3 ttl pk-yrs)     Types: Cigarettes     Start date: 1985     Quit date: 1990     Years since quittin.4    Smokeless tobacco: Never   Vaping Use    Vaping Use: Never used   Substance Use Topics    Alcohol use: Not Currently    Drug use: Not Currently       Vital Signs:   Vitals:    24 0739   BP: (!) 153/70   Pulse: 61   Resp: 16   Temp: 98 °F (36.7 °C)   SpO2: 98%        Physical Exam:  Cardiac:  [x]WNL  []Comments:  Pulmonary:  [x]WNL   []Comments:   Neuro/Mental Status:  [x]WNL  []Comments:  Abdominal:  [x]WNL     []Comments:  Other:   []WNL  []Comments:    Informed Consent:  The risks and benefits of the procedure have been discussed with either the patient or if they cannot consent, their representative.    Assessment:  Patient examined and appropriate for planned sedation and procedure.     Plan:  Proceed with planned sedation and procedure as above.    Wendy Stewart MD  7:52 AM

## 2024-01-08 NOTE — TELEPHONE ENCOUNTER
From: Mimi Duarte  To: Dr. Wendy Stewart  Sent: 1/5/2024 7:44 PM EST  Subject: 15 mm polyp?!    Hi! Thank you for doing the colonoscopy this morning. I had a wonderful experience. Everyone was great. I was reading the after visit info and saw I had a 15 mm polyp removed. Of course, I Googled what that could mean and freaked right out. Just wondering if I could talk with you or your staff about this. Thank you!

## 2024-01-09 DIAGNOSIS — F41.9 ANXIETY: Primary | ICD-10-CM

## 2024-01-22 ENCOUNTER — TELEMEDICINE (OUTPATIENT)
Dept: BEHAVIORAL HEALTH | Facility: CLINIC | Age: 59
End: 2024-01-22
Payer: MEDICARE

## 2024-01-22 DIAGNOSIS — F43.23 ADJUSTMENT DISORDER WITH MIXED ANXIETY AND DEPRESSED MOOD: ICD-10-CM

## 2024-01-22 DIAGNOSIS — F41.9 ANXIETY: ICD-10-CM

## 2024-01-22 PROCEDURE — 90791 PSYCH DIAGNOSTIC EVALUATION: CPT | Performed by: PSYCHOLOGIST
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
## 2024-01-29 ENCOUNTER — APPOINTMENT (OUTPATIENT)
Dept: BEHAVIORAL HEALTH | Facility: CLINIC | Age: 59
End: 2024-01-29
Payer: MEDICARE

## 2024-01-30 DIAGNOSIS — F43.23 ADJUSTMENT DISORDER WITH MIXED ANXIETY AND DEPRESSED MOOD: ICD-10-CM

## 2024-01-30 RX ORDER — BUSPIRONE HYDROCHLORIDE 5 MG/1
5 TABLET ORAL 3 TIMES DAILY PRN
Qty: 90 TABLET | Refills: 3 | Status: SHIPPED | OUTPATIENT
Start: 2024-01-30 | End: 2024-02-29

## 2024-02-02 ENCOUNTER — OFFICE VISIT (OUTPATIENT)
Dept: BEHAVIORAL HEALTH | Facility: CLINIC | Age: 59
End: 2024-02-02
Payer: MEDICARE

## 2024-02-02 DIAGNOSIS — F43.23 ADJUSTMENT DISORDER WITH MIXED ANXIETY AND DEPRESSED MOOD: ICD-10-CM

## 2024-02-02 PROCEDURE — 90837 PSYTX W PT 60 MINUTES: CPT | Performed by: PSYCHOLOGIST

## 2024-02-02 PROCEDURE — 1036F TOBACCO NON-USER: CPT | Performed by: PSYCHOLOGIST

## 2024-02-03 NOTE — PROGRESS NOTES
"8 AM 23719 Indiv Psych for Adjustment Disorder with Mixed Anxiety and Depressed Mood (60 MIN, [phone])  Virtual. Supportive Therapy. Doing a little better. Discussion with .  going to be doing ketamine for pain management. Wants to have her mood less dependent on his, \"need to get more distance from him.\" Wants to get better at \"adjusting expectations.\" Dialogue they had involved him saying he hasn't been treating her well and needs to take more responsibility for himself, more active role in doing something about how he'd doing including getting to doctor. Recalled that when they got together she wasn't aware he had bad headaches. It also sounds like they have worsened. Agreed if he's \"off\" he'll hug her which will be sign he's not doing well. Spent part of session discussing what she wants to be doing. Both want to make friends. Discussed finding meaningful activity and try some things out and hopefully meet like-minded others in process (biking, knitting, part-time job), already goes to gym, [employer] in [place]. Found support group she tried once, [employer], for spouses of individuals with TBI. Will continue to look for opportunities of interest. Next: 1 week.   "

## 2024-02-04 PROBLEM — Z12.11 COLON CANCER SCREENING: Status: RESOLVED | Noted: 2018-08-28 | Resolved: 2024-02-04

## 2024-02-07 ENCOUNTER — OFFICE VISIT (OUTPATIENT)
Dept: PRIMARY CARE | Facility: CLINIC | Age: 59
End: 2024-02-07
Payer: MEDICARE

## 2024-02-07 VITALS
OXYGEN SATURATION: 97 % | TEMPERATURE: 97.7 F | HEART RATE: 70 BPM | DIASTOLIC BLOOD PRESSURE: 81 MMHG | RESPIRATION RATE: 16 BRPM | SYSTOLIC BLOOD PRESSURE: 142 MMHG | BODY MASS INDEX: 27.78 KG/M2 | WEIGHT: 180 LBS

## 2024-02-07 DIAGNOSIS — N39.0 URINARY TRACT INFECTION WITH HEMATURIA, SITE UNSPECIFIED: Primary | ICD-10-CM

## 2024-02-07 DIAGNOSIS — R31.9 URINARY TRACT INFECTION WITH HEMATURIA, SITE UNSPECIFIED: Primary | ICD-10-CM

## 2024-02-07 LAB
POC APPEARANCE, URINE: CLEAR
POC BILIRUBIN, URINE: NEGATIVE
POC BLOOD, URINE: ABNORMAL
POC COLOR, URINE: YELLOW
POC GLUCOSE, URINE: NEGATIVE MG/DL
POC KETONES, URINE: NEGATIVE MG/DL
POC LEUKOCYTES, URINE: ABNORMAL
POC NITRITE,URINE: NEGATIVE
POC PH, URINE: 6.5 PH
POC SPECIFIC GRAVITY, URINE: 1.01
POC UROBILINOGEN, URINE: 0.2 EU/DL

## 2024-02-07 PROCEDURE — 87186 SC STD MICRODIL/AGAR DIL: CPT

## 2024-02-07 PROCEDURE — 87086 URINE CULTURE/COLONY COUNT: CPT

## 2024-02-07 PROCEDURE — 81003 URINALYSIS AUTO W/O SCOPE: CPT | Performed by: FAMILY MEDICINE

## 2024-02-07 PROCEDURE — 99213 OFFICE O/P EST LOW 20 MIN: CPT | Performed by: FAMILY MEDICINE

## 2024-02-07 RX ORDER — CIPROFLOXACIN 500 MG/1
500 TABLET ORAL 2 TIMES DAILY
Qty: 14 TABLET | Refills: 0 | Status: SHIPPED | OUTPATIENT
Start: 2024-02-07 | End: 2024-02-14

## 2024-02-07 ASSESSMENT — ENCOUNTER SYMPTOMS
FLANK PAIN: 0
DYSURIA: 1
NAUSEA: 0
SWEATS: 0
FREQUENCY: 1
VOMITING: 0
HEMATURIA: 0
CHILLS: 0

## 2024-02-07 NOTE — PROGRESS NOTES
Subjective   Patient ID: Barbara Yarbrough is a 59 y.o. female who presents for UTI (Frequency and urgency x 1 day).    Difficulty Urinating   This is a new problem. The current episode started today. The problem occurs every urination. The problem has been gradually worsening. The quality of the pain is described as burning. The pain is at a severity of 1/10. There has been no fever. She is Sexually active. There is No history of pyelonephritis. Associated symptoms include frequency and urgency. Pertinent negatives include no chills, discharge, flank pain, hematuria, hesitancy, nausea, possible pregnancy, sweats or vomiting.   Patient comes in today complaining of urinary tract symptoms.  She is having urinary frequency, urgency and burning that all started this morning.  She denies flank pain or other symptoms.    12/26/2023  Patient comes in today essentially as a new patient to the practice.  She claims that she was a patient here with her  many years ago.    Patient claims she is anxious but not depressed.  She has been on citalopram but has been off of it for a month.  She is under quite a bit of stress.  She is feeling stressed because of her  and his health issues.  She is feeling stressed because of her own weight gain.  She has neuropathy in both feet for which she takes gabapentin.  She is known hypothyroid and asthmatic both of which are under control with her current medications.  She has ocular migraines.    She sees GYN and claims her mammograms are up-to-date, last one 2 months ago.  She has not yet had a colonoscopy.  Her immunizations are up-to-date.    Review of Systems   Constitutional:  Negative for chills.   Gastrointestinal:  Negative for nausea and vomiting.   Genitourinary:  Positive for dysuria, frequency and urgency. Negative for flank pain, hematuria and hesitancy.   All other systems reviewed and are negative.      Objective   /81   Pulse 70   Temp 36.5 °C (97.7 °F)    Resp 16   Wt 81.6 kg (180 lb)   LMP  (LMP Unknown)   SpO2 97%   BMI 27.78 kg/m²     Physical Exam  Constitutional:       Appearance: She is well-developed.   HENT:      Head: Normocephalic and atraumatic.      Right Ear: Tympanic membrane, ear canal and external ear normal.      Left Ear: Tympanic membrane, ear canal and external ear normal.      Nose: Nose normal.      Mouth/Throat:      Mouth: Mucous membranes are moist.      Pharynx: Oropharynx is clear.   Eyes:      Extraocular Movements: Extraocular movements intact.      Conjunctiva/sclera: Conjunctivae normal.      Pupils: Pupils are equal, round, and reactive to light.   Cardiovascular:      Rate and Rhythm: Normal rate and regular rhythm.      Heart sounds: Normal heart sounds. No murmur heard.  Pulmonary:      Effort: Pulmonary effort is normal.      Breath sounds: Normal breath sounds. No wheezing.   Abdominal:      General: Abdomen is flat. Bowel sounds are normal.      Palpations: Abdomen is soft.      Tenderness: There is abdominal tenderness (Positive suprapubic tenderness to palpation, no flank tenderness.).   Musculoskeletal:         General: Normal range of motion.   Skin:     General: Skin is warm and dry.   Neurological:      General: No focal deficit present.      Mental Status: She is alert and oriented to person, place, and time. Mental status is at baseline.   Psychiatric:         Mood and Affect: Mood normal.         Behavior: Behavior normal.         Thought Content: Thought content normal.         Judgment: Judgment normal.       Assessment/Plan   Problem List Items Addressed This Visit    None  Visit Diagnoses         Codes    Urinary tract infection with hematuria, site unspecified    -  Primary N39.0, R31.9    Relevant Medications    ciprofloxacin (Cipro) 500 mg tablet    Other Relevant Orders    POCT UA Automated manually resulted (Completed)    Urine Culture        Patient encouraged to drink lots of fluids and cranberry  juice.  Will notify patient of urine culture results when available.  Use meds as directed.  Return to clinic if symptoms do not improve in 7-10 days, sooner if condition worsens.  Medication list reconciled.  Thank you for visiting today!      Professional services: Some of this note was completed using Dragon voice technology and sometimes the software misinterprets words. This may include unintended errors with respect to translation of words, typographical errors or grammar errors which may not have been identified prior to finalization of the chart note. Please take this into account when reading the note. Thank you.

## 2024-02-09 ENCOUNTER — OFFICE VISIT (OUTPATIENT)
Dept: BEHAVIORAL HEALTH | Facility: CLINIC | Age: 59
End: 2024-02-09
Payer: MEDICARE

## 2024-02-09 DIAGNOSIS — F43.23 ADJUSTMENT DISORDER WITH MIXED ANXIETY AND DEPRESSED MOOD: ICD-10-CM

## 2024-02-09 PROCEDURE — 1036F TOBACCO NON-USER: CPT | Performed by: PSYCHOLOGIST

## 2024-02-09 PROCEDURE — 90837 PSYTX W PT 60 MINUTES: CPT | Performed by: PSYCHOLOGIST

## 2024-02-10 LAB — BACTERIA UR CULT: ABNORMAL
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
## 2024-02-15 DIAGNOSIS — B35.1 DERMATOPHYTOSIS OF NAIL: Primary | ICD-10-CM

## 2024-02-15 RX ORDER — TERBINAFINE HYDROCHLORIDE 250 MG/1
250 TABLET ORAL DAILY
Qty: 42 TABLET | Refills: 0 | OUTPATIENT
Start: 2024-02-15

## 2024-02-15 RX ORDER — TERBINAFINE HYDROCHLORIDE 250 MG/1
250 TABLET ORAL DAILY
Qty: 42 TABLET | Refills: 0 | Status: SHIPPED | OUTPATIENT
Start: 2024-02-15 | End: 2024-03-28

## 2024-02-16 ENCOUNTER — APPOINTMENT (OUTPATIENT)
Dept: BEHAVIORAL HEALTH | Facility: CLINIC | Age: 59
End: 2024-02-16
Payer: MEDICARE

## 2024-02-21 DIAGNOSIS — E66.3 OVERWEIGHT: Primary | ICD-10-CM

## 2024-02-21 RX ORDER — PHENTERMINE HYDROCHLORIDE 37.5 MG/1
37.5 TABLET ORAL
Qty: 30 TABLET | Refills: 0 | Status: SHIPPED | OUTPATIENT
Start: 2024-02-21 | End: 2024-03-22

## 2024-02-23 ENCOUNTER — APPOINTMENT (OUTPATIENT)
Dept: BEHAVIORAL HEALTH | Facility: CLINIC | Age: 59
End: 2024-02-23
Payer: MEDICARE

## 2024-03-01 ENCOUNTER — APPOINTMENT (OUTPATIENT)
Dept: BEHAVIORAL HEALTH | Facility: CLINIC | Age: 59
End: 2024-03-01
Payer: MEDICARE

## 2024-03-12 DIAGNOSIS — E66.3 OVERWEIGHT: ICD-10-CM

## 2024-03-12 RX ORDER — PHENTERMINE HYDROCHLORIDE 37.5 MG/1
37.5 TABLET ORAL
Qty: 30 TABLET | Refills: 1 | Status: SHIPPED | OUTPATIENT
Start: 2024-03-12 | End: 2024-03-18 | Stop reason: SDUPTHER

## 2024-03-12 RX ORDER — PHENTERMINE HYDROCHLORIDE 37.5 MG/1
37.5 TABLET ORAL
Qty: 30 TABLET | Refills: 0 | Status: CANCELLED | OUTPATIENT
Start: 2024-03-12 | End: 2024-04-11

## 2024-03-14 DIAGNOSIS — J45.30 MILD PERSISTENT ASTHMA WITHOUT COMPLICATION: ICD-10-CM

## 2024-03-14 RX ORDER — FLUTICASONE PROPIONATE AND SALMETEROL 100; 50 UG/1; UG/1
POWDER RESPIRATORY (INHALATION)
Qty: 60 EACH | Refills: 5 | Status: SHIPPED | OUTPATIENT
Start: 2024-03-14

## 2024-03-14 NOTE — TELEPHONE ENCOUNTER
Comments:     Last Office Visit (last PCP visit):   11/29/2023    Next Visit Date:  Future Appointments   Date Time Provider Department Center   3/15/2024  9:45 AM Elliot Hardin DPM [facility]   4/5/2024  9:30 AM Damien Sena MD [facility] -   5/29/2024  9:45 AM Aleida Reardon MD [facility]       **If hasn't been seen in over a year OR hasn't followed up according to last diabetes/ADHD visit, make appointment for patient before sending refill to provider.    Rx requested:  Requested Prescriptions     Pending Prescriptions Disp Refills    fluticasone-salmeterol (WIXELA INHUB) 100-50 MCG/ACT AEPB diskus inhaler 60 each 5     Sig: INHALE 1 PUFF AS INSTRUCTED TWICE DAILY. RINSE AND GARGLE MOUTH WITH WATER AFTER EACH USE

## 2024-03-15 ENCOUNTER — OFFICE VISIT (OUTPATIENT)
Age: 59
End: 2024-03-15
Payer: COMMERCIAL

## 2024-03-15 VITALS — BODY MASS INDEX: 26.98 KG/M2 | WEIGHT: 178 LBS | HEIGHT: 68 IN | TEMPERATURE: 97.6 F

## 2024-03-15 DIAGNOSIS — M20.5X2 OVER-RIDING TOE, LEFT: ICD-10-CM

## 2024-03-15 DIAGNOSIS — B35.1 DERMATOPHYTOSIS OF NAIL: Primary | ICD-10-CM

## 2024-03-15 PROCEDURE — 99213 OFFICE O/P EST LOW 20 MIN: CPT | Performed by: PODIATRIST

## 2024-03-15 ASSESSMENT — ENCOUNTER SYMPTOMS
BACK PAIN: 0
NAUSEA: 0
SHORTNESS OF BREATH: 0
VOMITING: 0

## 2024-03-15 NOTE — PROGRESS NOTES
spelling or words and phrases that may seem inappropriate. If there are questions or concerns please feel free to contact me for clarification.

## 2024-03-18 DIAGNOSIS — E66.3 OVERWEIGHT: ICD-10-CM

## 2024-03-18 RX ORDER — PHENTERMINE HYDROCHLORIDE 37.5 MG/1
37.5 TABLET ORAL
Qty: 30 TABLET | Refills: 1 | Status: SHIPPED | OUTPATIENT
Start: 2024-03-18 | End: 2024-04-17

## 2024-03-22 ENCOUNTER — APPOINTMENT (OUTPATIENT)
Dept: BEHAVIORAL HEALTH | Facility: CLINIC | Age: 59
End: 2024-03-22
Payer: MEDICARE

## 2024-04-03 SDOH — ECONOMIC STABILITY: INCOME INSECURITY: HOW HARD IS IT FOR YOU TO PAY FOR THE VERY BASICS LIKE FOOD, HOUSING, MEDICAL CARE, AND HEATING?: NOT HARD AT ALL

## 2024-04-03 SDOH — ECONOMIC STABILITY: FOOD INSECURITY: WITHIN THE PAST 12 MONTHS, YOU WORRIED THAT YOUR FOOD WOULD RUN OUT BEFORE YOU GOT MONEY TO BUY MORE.: NEVER TRUE

## 2024-04-03 SDOH — ECONOMIC STABILITY: FOOD INSECURITY: WITHIN THE PAST 12 MONTHS, THE FOOD YOU BOUGHT JUST DIDN'T LAST AND YOU DIDN'T HAVE MONEY TO GET MORE.: NEVER TRUE

## 2024-04-03 ASSESSMENT — PATIENT HEALTH QUESTIONNAIRE - PHQ9
SUM OF ALL RESPONSES TO PHQ9 QUESTIONS 1 & 2: 0
2. FEELING DOWN, DEPRESSED OR HOPELESS: NOT AT ALL
SUM OF ALL RESPONSES TO PHQ9 QUESTIONS 1 & 2: 0
SUM OF ALL RESPONSES TO PHQ QUESTIONS 1-9: 0
1. LITTLE INTEREST OR PLEASURE IN DOING THINGS: NOT AT ALL
SUM OF ALL RESPONSES TO PHQ QUESTIONS 1-9: 0
1. LITTLE INTEREST OR PLEASURE IN DOING THINGS: NOT AT ALL
2. FEELING DOWN, DEPRESSED OR HOPELESS: NOT AT ALL
SUM OF ALL RESPONSES TO PHQ QUESTIONS 1-9: 0
SUM OF ALL RESPONSES TO PHQ QUESTIONS 1-9: 0

## 2024-04-05 ENCOUNTER — OFFICE VISIT (OUTPATIENT)
Dept: NEUROLOGY | Age: 59
End: 2024-04-05
Payer: COMMERCIAL

## 2024-04-05 ENCOUNTER — OFFICE VISIT (OUTPATIENT)
Dept: PRIMARY CARE CLINIC | Age: 59
End: 2024-04-05
Payer: COMMERCIAL

## 2024-04-05 VITALS
HEART RATE: 63 BPM | BODY MASS INDEX: 24.78 KG/M2 | TEMPERATURE: 98.1 F | RESPIRATION RATE: 18 BRPM | OXYGEN SATURATION: 97 % | WEIGHT: 163 LBS | SYSTOLIC BLOOD PRESSURE: 138 MMHG | DIASTOLIC BLOOD PRESSURE: 80 MMHG

## 2024-04-05 VITALS
BODY MASS INDEX: 24.78 KG/M2 | HEART RATE: 60 BPM | WEIGHT: 163 LBS | SYSTOLIC BLOOD PRESSURE: 122 MMHG | DIASTOLIC BLOOD PRESSURE: 64 MMHG

## 2024-04-05 DIAGNOSIS — E66.3 OVERWEIGHT: ICD-10-CM

## 2024-04-05 DIAGNOSIS — M79.2 NEUROPATHIC PAIN: ICD-10-CM

## 2024-04-05 DIAGNOSIS — R20.2 DISTAL PARESTHESIA: ICD-10-CM

## 2024-04-05 DIAGNOSIS — G62.9 SMALL FIBER NEUROPATHY: Primary | ICD-10-CM

## 2024-04-05 DIAGNOSIS — I10 ESSENTIAL HYPERTENSION: Primary | ICD-10-CM

## 2024-04-05 PROCEDURE — 3079F DIAST BP 80-89 MM HG: CPT | Performed by: INTERNAL MEDICINE

## 2024-04-05 PROCEDURE — 99213 OFFICE O/P EST LOW 20 MIN: CPT | Performed by: PSYCHIATRY & NEUROLOGY

## 2024-04-05 PROCEDURE — 3075F SYST BP GE 130 - 139MM HG: CPT | Performed by: INTERNAL MEDICINE

## 2024-04-05 PROCEDURE — 99214 OFFICE O/P EST MOD 30 MIN: CPT | Performed by: INTERNAL MEDICINE

## 2024-04-05 NOTE — PROGRESS NOTES
normal.   Pulmonary:      Effort: Pulmonary effort is normal. No respiratory distress.      Breath sounds: Normal breath sounds. No wheezing or rales.   Chest:      Chest wall: No tenderness.   Abdominal:      Tenderness: There is no abdominal tenderness.   Neurological:      Mental Status: She is alert.       Assessment:       Diagnosis Orders   1. Essential hypertension Controlled       2. Overweight Improving     will continue Adipex for a total of 3 months   Lifestyle modification advised.        Plan:       Return in about 2 months (around 6/5/2024) for follow up, with PCP.

## 2024-04-05 NOTE — PROGRESS NOTES
and Rhythm: Normal rate and regular rhythm.      Heart sounds: No murmur heard.  Pulmonary:      Effort: Pulmonary effort is normal.      Breath sounds: Normal breath sounds.   Abdominal:      General: Bowel sounds are normal.   Musculoskeletal:         General: Normal range of motion.      Cervical back: Normal range of motion.   Skin:     General: Skin is warm.   Neurological:      Mental Status: She is alert and oriented to person, place, and time.      Cranial Nerves: No cranial nerve deficit.      Sensory: No sensory deficit.      Motor: No abnormal muscle tone.      Coordination: Coordination normal.      Deep Tendon Reflexes: Reflexes are normal and symmetric. Babinski sign absent on the right side. Babinski sign absent on the left side.   Psychiatric:         Mood and Affect: Mood normal.         Colonoscopy    Result Date: 2024  [facility] Patient: PETE SANCHEZ MRN: [id number] : 1965 Account: [id number] Sex at Birth: Female Age: 58 Years Procedure: Colonoscopy Date: 2024 Attending Physician: Wendy Stewart Indications:        -  Screening for colorectal malignant neoplasm Medications:        -  Monitored Anesthesia Care Complications:        -  No immediate complications. Estimated Blood Loss:        -  Estimated blood loss: none. Procedure:        - The Colonoscope was introduced through the anus and advanced to the cecum,           identified by appendiceal orifice and ileocecal valve.        -  The patient tolerated the procedure well.        -  The quality of the bowel preparation was good.        -  The ileocecal valve, appendiceal orifice, and rectum were photographed. Findings:        -  A 15 mm polyp was found in the descending colon.  The polyp was           pedunculated.  The polyp was removed with a hot snare.   Resection and           retrieval were complete.        -  Multiple small-mouthed, large-mouthed and medium-mouthed diverticula were           found in the

## 2024-04-06 ASSESSMENT — ENCOUNTER SYMPTOMS
NAUSEA: 0
VOMITING: 0
ABDOMINAL PAIN: 0
DIARRHEA: 0
COUGH: 0
SHORTNESS OF BREATH: 0
WHEEZING: 0

## 2024-04-14 DIAGNOSIS — F43.23 ADJUSTMENT DISORDER WITH MIXED ANXIETY AND DEPRESSED MOOD: ICD-10-CM

## 2024-04-15 RX ORDER — BUPROPION HYDROCHLORIDE 150 MG/1
TABLET ORAL
Qty: 90 TABLET | Refills: 3 | Status: SHIPPED | OUTPATIENT
Start: 2024-04-15

## 2024-04-20 DIAGNOSIS — Z00.00 HEALTH CARE MAINTENANCE: Primary | ICD-10-CM

## 2024-04-23 ENCOUNTER — PATIENT MESSAGE (OUTPATIENT)
Dept: PRIMARY CARE | Facility: CLINIC | Age: 59
End: 2024-04-23
Payer: MEDICARE

## 2024-06-05 ENCOUNTER — PATIENT MESSAGE (OUTPATIENT)
Dept: NEUROLOGY | Age: 59
End: 2024-06-05

## 2024-06-05 ENCOUNTER — TELEPHONE (OUTPATIENT)
Dept: PAIN MEDICINE | Facility: CLINIC | Age: 59
End: 2024-06-05
Payer: MEDICARE

## 2024-06-05 ENCOUNTER — OFFICE VISIT (OUTPATIENT)
Dept: PRIMARY CARE CLINIC | Age: 59
End: 2024-06-05
Payer: COMMERCIAL

## 2024-06-05 VITALS
HEART RATE: 68 BPM | SYSTOLIC BLOOD PRESSURE: 124 MMHG | OXYGEN SATURATION: 98 % | RESPIRATION RATE: 18 BRPM | BODY MASS INDEX: 23.72 KG/M2 | TEMPERATURE: 97 F | DIASTOLIC BLOOD PRESSURE: 84 MMHG | WEIGHT: 156 LBS

## 2024-06-05 DIAGNOSIS — E66.3 OVERWEIGHT: Primary | ICD-10-CM

## 2024-06-05 DIAGNOSIS — Z00.00 PREVENTATIVE HEALTH CARE: ICD-10-CM

## 2024-06-05 DIAGNOSIS — G62.9 SMALL FIBER NEUROPATHY: ICD-10-CM

## 2024-06-05 DIAGNOSIS — J45.30 MILD PERSISTENT ASTHMA WITHOUT COMPLICATION: ICD-10-CM

## 2024-06-05 DIAGNOSIS — E03.9 ACQUIRED HYPOTHYROIDISM: ICD-10-CM

## 2024-06-05 PROCEDURE — 99214 OFFICE O/P EST MOD 30 MIN: CPT | Performed by: INTERNAL MEDICINE

## 2024-06-05 RX ORDER — BUSPIRONE HYDROCHLORIDE 5 MG/1
TABLET ORAL
Qty: 30 TABLET | Refills: 5 | Status: SHIPPED | OUTPATIENT
Start: 2024-06-05

## 2024-06-05 RX ORDER — LORATADINE 10 MG/1
10 TABLET ORAL DAILY
Qty: 90 TABLET | Refills: 3 | Status: SHIPPED | OUTPATIENT
Start: 2024-06-05

## 2024-06-05 RX ORDER — BUSPIRONE HYDROCHLORIDE 5 MG/1
TABLET ORAL
COMMUNITY
Start: 2024-06-04 | End: 2024-06-05 | Stop reason: SDUPTHER

## 2024-06-05 RX ORDER — GABAPENTIN 100 MG/1
CAPSULE ORAL
Qty: 630 CAPSULE | Refills: 3 | Status: SHIPPED | OUTPATIENT
Start: 2024-06-05 | End: 2024-09-05

## 2024-06-05 RX ORDER — FOLIC ACID 1 MG/1
1000 TABLET ORAL DAILY
Qty: 90 TABLET | Refills: 3 | Status: SHIPPED | OUTPATIENT
Start: 2024-06-05

## 2024-06-05 RX ORDER — LEVOTHYROXINE SODIUM 0.07 MG/1
75 TABLET ORAL DAILY
Qty: 90 TABLET | Refills: 3 | Status: SHIPPED | OUTPATIENT
Start: 2024-06-05

## 2024-06-05 RX ORDER — MONTELUKAST SODIUM 10 MG/1
10 TABLET ORAL NIGHTLY
Qty: 90 TABLET | Refills: 3 | Status: SHIPPED | OUTPATIENT
Start: 2024-06-05

## 2024-06-05 RX ORDER — LANOLIN ALCOHOL/MO/W.PET/CERES
100 CREAM (GRAM) TOPICAL DAILY
Qty: 90 TABLET | Refills: 3 | Status: SHIPPED | OUTPATIENT
Start: 2024-06-05

## 2024-06-05 SDOH — ECONOMIC STABILITY: FOOD INSECURITY: WITHIN THE PAST 12 MONTHS, THE FOOD YOU BOUGHT JUST DIDN'T LAST AND YOU DIDN'T HAVE MONEY TO GET MORE.: NEVER TRUE

## 2024-06-05 SDOH — ECONOMIC STABILITY: FOOD INSECURITY: WITHIN THE PAST 12 MONTHS, YOU WORRIED THAT YOUR FOOD WOULD RUN OUT BEFORE YOU GOT MONEY TO BUY MORE.: NEVER TRUE

## 2024-06-05 SDOH — ECONOMIC STABILITY: INCOME INSECURITY: HOW HARD IS IT FOR YOU TO PAY FOR THE VERY BASICS LIKE FOOD, HOUSING, MEDICAL CARE, AND HEATING?: NOT HARD AT ALL

## 2024-06-05 ASSESSMENT — ENCOUNTER SYMPTOMS
COUGH: 0
ABDOMINAL PAIN: 0
WHEEZING: 0
NAUSEA: 0
SHORTNESS OF BREATH: 0
VOMITING: 0
DIARRHEA: 0

## 2024-06-05 ASSESSMENT — PATIENT HEALTH QUESTIONNAIRE - PHQ9
SUM OF ALL RESPONSES TO PHQ QUESTIONS 1-9: 0
1. LITTLE INTEREST OR PLEASURE IN DOING THINGS: NOT AT ALL
SUM OF ALL RESPONSES TO PHQ QUESTIONS 1-9: 0
2. FEELING DOWN, DEPRESSED OR HOPELESS: NOT AT ALL
SUM OF ALL RESPONSES TO PHQ9 QUESTIONS 1 & 2: 0

## 2024-06-05 NOTE — PROGRESS NOTES
every 8 hours as needed for anxiety.     Dispense:  30 tablet     Refill:  5    folic acid (FOLVITE) 1 MG tablet     Sig: Take 1 tablet by mouth daily     Dispense:  90 tablet     Refill:  3    gabapentin (NEURONTIN) 100 MG capsule     Sig: TAKE 2 CAPSULES BY MOUTH IN THE MORNING AND AFTERNOON AND 3 CAPSULES AT NIGHT     Dispense:  630 capsule     Refill:  3    loratadine (CLARITIN) 10 MG tablet     Sig: Take 1 tablet by mouth daily     Dispense:  90 tablet     Refill:  3    montelukast (SINGULAIR) 10 MG tablet     Sig: Take 1 tablet by mouth nightly     Dispense:  90 tablet     Refill:  3    thiamine 100 MG tablet     Sig: Take 1 tablet by mouth daily     Dispense:  90 tablet     Refill:  3    levothyroxine (SYNTHROID) 75 MCG tablet     Sig: Take 1 tablet by mouth daily     Dispense:  90 tablet     Refill:  3   Pt requested for more Adip[ex. I explained my reluctance to refill based on it's scheduled property. She will reach out to another provider willing to prescribe it on longer-term basis.   Return in about 6 months (around 12/5/2024) for follow up, with PCP.

## 2024-06-06 ENCOUNTER — TELEPHONE (OUTPATIENT)
Dept: NEUROLOGY | Age: 59
End: 2024-06-06

## 2024-06-06 NOTE — TELEPHONE ENCOUNTER
Adriana message from pt:    Hi!  This time I'm bugging you through my chart and not Yasir's :)This is about my recent experience taking adipex for weight loss.  Dr. Reardon prescribed it for me for 3 months and not only did I lose weight, but the peripheral neuropathy in my feet got a lot better.  During the last month of taking adipex, I was able to reduce the amount of Gabapentin I take  by 200 mgs a day, and on 3 of those days, I was able to reduce it by 300 mgs, and my feet still felt almost healthy.  It's now  been 2 weeks since I stopped taking the adipex and my feet are really bugging me again with pain, tingling, and spasms.  I'm back to taking my regular Gabapentin dosage and my feet still are not back to where they were before the adipex.  I just saw Dr. Reardon this morning hoping she would extend the prescription for adipex since I need to lose more weight, especially belly weight, and to continue the bonus benefit to my neuropathy,  but she said she wouldn't prescribe more because she \"doesn't prescribe any controlled substance\" and I guess adipex is one of those.  I was wondering if Dr. Sena ever heard of adipex helping peripheral neuropathy and if he would consider prescribing adipex for my neuropathy and to help me lose the belly fat I still need to lose for my liver health and overall health.  I'm sorry for the long message.   If I should make an appointment to discuss this. I'd be more than happy to.  As always, thank you!!  Have a great afternoon and take care!

## 2024-06-11 RX ORDER — LANOLIN ALCOHOL/MO/W.PET/CERES
100 CREAM (GRAM) TOPICAL DAILY
Qty: 90 TABLET | Refills: 3 | OUTPATIENT
Start: 2024-06-11

## 2024-06-11 RX ORDER — LORATADINE 10 MG/1
10 TABLET ORAL DAILY
Qty: 90 TABLET | Refills: 3 | OUTPATIENT
Start: 2024-06-11

## 2024-06-12 NOTE — PROGRESS NOTES
History of Present Complaint:  The patient was referred to us by Referring Provider: Her  who is a patient of mine. this is 59 y.o.  female by her  Tobias who is a patient of ours (IV infusion) with a past history of anxiety and depression on citalopram, hypothyroidism, controlled asthma, alcoholic peripheral neuropathy on gabapentin 100 mg and medical marijuana presenting with bilateral feet burning and neuropathy pain she had an EMG done few years ago and it was positive for peripheral neuropathy.  The patient was on phentermine for her weight loss and that helped significantly with the neuropathy and she decreased the amount of the gabapentin she is taking and after the weight loss doctor would not continue prescribing phentermine for her.  Patient here asking for other solutions.  She takes the 100 mg capsule of gabapentin 2 of them at night during the afternoon and 3 at night.      Procedures:   None  Portions of record reviewed for pertinent issues: active problem list, medication list, allergies, family history, social history, notes from last encounter, encounters, lab results, imaging and other available records.    I have personally reviewed the OARRS report for this patient. This report is scanned into the electronic medical record. I have considered the risks of abuse, dependence, addiction and diversion. It showed: Medical marijuana and gabapentin 100 mg Essence Samuel   OPIOID RISK ASSESSMENT SCORE 6/26  Aberrant behavior: None      Diagnostic studies:  The patient stated that she had an EMG done at [facility] and told that she has peripheral neuropathy but we do not have the report      Employment/disability/litigation: Retired  for the "[employer]    Social History: , finished law school, quit drinking since 2019 denies smoking she is on medical marijuana      Review of Systems   HENT: Negative.     Eyes: Negative.    Respiratory: Negative.     Cardiovascular: Negative.     Gastrointestinal: Negative.    Endocrine: Negative.    Genitourinary: Negative.    Musculoskeletal:  Positive for myalgias.   Skin: Negative.    Neurological:  Positive for numbness.        Burning in the feet   Hematological: Negative.    Psychiatric/Behavioral: Negative.            Physical Exam  Vitals and nursing note reviewed.   Constitutional:       Appearance: Normal appearance.   HENT:      Head: Normocephalic and atraumatic.      Nose: Nose normal.   Eyes:      Extraocular Movements: Extraocular movements intact.      Conjunctiva/sclera: Conjunctivae normal.      Pupils: Pupils are equal, round, and reactive to light.   Cardiovascular:      Rate and Rhythm: Normal rate and regular rhythm.      Pulses: Normal pulses.      Heart sounds: Normal heart sounds.   Pulmonary:      Effort: Pulmonary effort is normal.      Breath sounds: Normal breath sounds.   Abdominal:      General: Abdomen is flat. Bowel sounds are normal.      Palpations: Abdomen is soft.   Skin:     General: Skin is warm.   Neurological:      General: No focal deficit present.      Mental Status: She is alert.      Sensory: Sensory deficit present.      Motor: Motor function is intact.      Coordination: Coordination is intact.      Gait: Gait is intact.      Deep Tendon Reflexes: Reflexes abnormal.      Reflex Scores:       Tricep reflexes are 4+ on the right side and 4+ on the left side.       Bicep reflexes are 4+ on the right side and 4+ on the left side.       Brachioradialis reflexes are 4+ on the right side and 4+ on the left side.       Patellar reflexes are 4+ on the right side and 4+ on the left side.       Achilles reflexes are 0 on the right side and 0 on the left side.     Comments: Loss of vibratory sensation in both feet   Psychiatric:         Mood and Affect: Mood normal.         Behavior: Behavior normal.            Assessment  Very pleasant 59 years old with peripheral alcoholic neuropathy OARRS stopped drinking since 2019  and she did very well with phentermine while her weight loss now the patient has a lot of breakthrough pain with her gabapentin 100 mg that she takes 200 mg in the morning 200 in the evening and 300 mg at night.  Patient very sensitive to gabapentin we will start her on Gralise 600 mg daily hopefully that will help without the need to go for any other medication.  We discussed pregabalin as well.  I like her to increase her alpha lipoic acid and I added the coenzyme Q 10 and super B complex for her therapy.  I recommended also Boris acupuncture.           Plan  At least 50% of the visit was involved in the discussion of the options for treatment. We discussed exercises, medication, interventional therapies and surgery. Healthy life style is essential with patient hard work to achieve the wellness. In addition; discussion with the patient and/or family about any of the diagnostic results, impressions and/or recommended diagnostic studies, prognosis, risks and benefits of treatment options, instructions for treatment and/or follow-up, importance of compliance with chosen treatment options, risk-factor reduction, and patient/family education.         Pool therapy, walking in the pool, at least 3x per week for 30 minutes  Gralis 600 mg once daily and DC gabapentin  Neuro supplement sent to the patient  Referral to [facility] chiropractic care  Healthy lifestyle and anti-inflammatory diet in addition to weight control discussed with the patient  Alternative chronic pain therapies was discussed, encouraged and information was handed  Return to Clinic 3 months       *Please note this report has been produced using speech recognition software and may contain errors related to that system including grammar, punctuation and spelling as well as words and phrases that may be inappropriate. If there are questions or concerns, please feel free to contact me to clarify.    Lily Moffett MD

## 2024-06-19 ENCOUNTER — OFFICE VISIT (OUTPATIENT)
Dept: PAIN MEDICINE | Facility: CLINIC | Age: 59
End: 2024-06-19
Payer: MEDICARE

## 2024-06-19 VITALS
WEIGHT: 182 LBS | TEMPERATURE: 98.2 F | RESPIRATION RATE: 18 BRPM | OXYGEN SATURATION: 99 % | HEART RATE: 62 BPM | BODY MASS INDEX: 27.58 KG/M2 | DIASTOLIC BLOOD PRESSURE: 88 MMHG | HEIGHT: 68 IN | SYSTOLIC BLOOD PRESSURE: 143 MMHG

## 2024-06-19 DIAGNOSIS — G62.1 ALCOHOLIC PERIPHERAL NEUROPATHY (MULTI): Primary | ICD-10-CM

## 2024-06-19 PROBLEM — I10 ESSENTIAL HYPERTENSION: Status: ACTIVE | Noted: 2018-05-15

## 2024-06-19 PROBLEM — G62.9 PERIPHERAL NEUROPATHY: Status: ACTIVE | Noted: 2020-07-14

## 2024-06-19 PROBLEM — R19.00 PELVIC MASS: Status: ACTIVE | Noted: 2018-09-11

## 2024-06-19 PROBLEM — E03.9 ACQUIRED HYPOTHYROIDISM: Status: ACTIVE | Noted: 2017-03-24

## 2024-06-19 PROBLEM — S63.259A CLOSED DISLOCATION OF FINGER OF LEFT HAND: Status: ACTIVE | Noted: 2018-05-04

## 2024-06-19 PROBLEM — J45.30 MILD PERSISTENT ASTHMA WITHOUT COMPLICATION (HHS-HCC): Status: ACTIVE | Noted: 2017-03-24

## 2024-06-19 PROBLEM — M25.642 STIFFNESS OF FINGER JOINT, LEFT: Status: ACTIVE | Noted: 2018-09-27

## 2024-06-19 PROCEDURE — 99214 OFFICE O/P EST MOD 30 MIN: CPT | Performed by: ANESTHESIOLOGY

## 2024-06-19 PROCEDURE — 1036F TOBACCO NON-USER: CPT | Performed by: ANESTHESIOLOGY

## 2024-06-19 PROCEDURE — 99204 OFFICE O/P NEW MOD 45 MIN: CPT | Performed by: ANESTHESIOLOGY

## 2024-06-19 RX ORDER — PERPHENAZINE 16 MG
TABLET ORAL
Qty: 180 CAPSULE | Refills: 11 | Status: SHIPPED | OUTPATIENT
Start: 2024-06-19

## 2024-06-19 RX ORDER — VITAMIN B COMPLEX
1 CAPSULE ORAL 2 TIMES DAILY
Qty: 60 CAPSULE | Refills: 11 | Status: SHIPPED | OUTPATIENT
Start: 2024-06-19 | End: 2025-06-19

## 2024-06-19 RX ORDER — ACETAMINOPHEN 160 MG/5ML
200 SUSPENSION, ORAL (FINAL DOSE FORM) ORAL 3 TIMES DAILY
Qty: 90 CAPSULE | Refills: 11 | Status: SHIPPED | OUTPATIENT
Start: 2024-06-19 | End: 2025-06-19

## 2024-06-19 RX ORDER — GABAPENTIN 600 MG/1
1800 TABLET, FILM COATED ORAL DAILY
Qty: 30 TABLET | Refills: 3 | Status: SHIPPED | OUTPATIENT
Start: 2024-06-19 | End: 2024-06-20 | Stop reason: SDUPTHER

## 2024-06-19 SDOH — ECONOMIC STABILITY: FOOD INSECURITY: WITHIN THE PAST 12 MONTHS, YOU WORRIED THAT YOUR FOOD WOULD RUN OUT BEFORE YOU GOT MONEY TO BUY MORE.: NEVER TRUE

## 2024-06-19 SDOH — ECONOMIC STABILITY: FOOD INSECURITY: WITHIN THE PAST 12 MONTHS, THE FOOD YOU BOUGHT JUST DIDN'T LAST AND YOU DIDN'T HAVE MONEY TO GET MORE.: NEVER TRUE

## 2024-06-19 ASSESSMENT — ENCOUNTER SYMPTOMS
PSYCHIATRIC NEGATIVE: 1
ENDOCRINE NEGATIVE: 1
EYES NEGATIVE: 1
OCCASIONAL FEELINGS OF UNSTEADINESS: 0
NUMBNESS: 1
CARDIOVASCULAR NEGATIVE: 1
RESPIRATORY NEGATIVE: 1
LOSS OF SENSATION IN FEET: 0
MYALGIAS: 1
GASTROINTESTINAL NEGATIVE: 1
DEPRESSION: 0
HEMATOLOGIC/LYMPHATIC NEGATIVE: 1

## 2024-06-19 ASSESSMENT — LIFESTYLE VARIABLES
HAVE YOU OR SOMEONE ELSE BEEN INJURED AS A RESULT OF YOUR DRINKING: NO
HOW OFTEN DO YOU HAVE SIX OR MORE DRINKS ON ONE OCCASION: NEVER
HAS A RELATIVE, FRIEND, DOCTOR, OR ANOTHER HEALTH PROFESSIONAL EXPRESSED CONCERN ABOUT YOUR DRINKING OR SUGGESTED YOU CUT DOWN: NO
AUDIT TOTAL SCORE: 0
TOTAL SCORE: 6
SKIP TO QUESTIONS 9-10: 1
HOW OFTEN DO YOU HAVE A DRINK CONTAINING ALCOHOL: NEVER
AUDIT-C TOTAL SCORE: 0
HOW MANY STANDARD DRINKS CONTAINING ALCOHOL DO YOU HAVE ON A TYPICAL DAY: PATIENT DOES NOT DRINK

## 2024-06-19 ASSESSMENT — PAIN - FUNCTIONAL ASSESSMENT: PAIN_FUNCTIONAL_ASSESSMENT: 0-10

## 2024-06-19 ASSESSMENT — PAIN DESCRIPTION - DESCRIPTORS: DESCRIPTORS: BURNING;NUMBNESS

## 2024-06-19 ASSESSMENT — COLUMBIA-SUICIDE SEVERITY RATING SCALE - C-SSRS
1. IN THE PAST MONTH, HAVE YOU WISHED YOU WERE DEAD OR WISHED YOU COULD GO TO SLEEP AND NOT WAKE UP?: NO
2. HAVE YOU ACTUALLY HAD ANY THOUGHTS OF KILLING YOURSELF?: NO
6. HAVE YOU EVER DONE ANYTHING, STARTED TO DO ANYTHING, OR PREPARED TO DO ANYTHING TO END YOUR LIFE?: NO

## 2024-06-19 ASSESSMENT — PAIN SCALES - GENERAL
PAINLEVEL: 4
PAINLEVEL_OUTOF10: 4

## 2024-06-19 ASSESSMENT — PATIENT HEALTH QUESTIONNAIRE - PHQ9
SUM OF ALL RESPONSES TO PHQ9 QUESTIONS 1 AND 2: 0
1. LITTLE INTEREST OR PLEASURE IN DOING THINGS: NOT AT ALL
2. FEELING DOWN, DEPRESSED OR HOPELESS: NOT AT ALL

## 2024-06-19 NOTE — PROGRESS NOTES
"Chief Complaint   Patient presents with    New Patient Visit     History of Present Complaint:  The patient was referred to us by Referring Provider: Spouse . this is 59 y.o.  female  with a past history of anxiety and depression on citalopram, hypothyroidism, controlled asthma, neuropathy on gabapentin 100 mg and medical marijuana  presenting with  bilateral feet.    Pain started 5-6 years      Pain felt better with adipex.  Pain is worst with weather , and \"everything \".    The pain is described as  burning , twitching ,numbness .      Prior Pain Therapies:  PCP treats her with Gabapentin  .    Past surgical history:   jaw surgery , ovary removal , hernia surgery .    Employment/disability/litigation: retired an  for the [email or web address]     Social history: , Finished high school, and Finished college major in law school .    Diagnostic studies: none    Opioid Risk Assessment Score 6/26 hx anxiety     Ronaldo Each Box that Applies Female Male   FAMILY HISTORY OF SUBSTANCE ABUSE  Ronaldo the boxes that applies   Alcohol ?  1    ? 3   Illegal drugs ?  2 ? 3   Rx drugs ?  4 ? 4   PERSONAL HISTORY OF SUBSTNACE ABUSE   Alcohol X  3 stopped 2019 ?  3   Illegal drugs ?  4 ?  4    Rx drugs ?  5 ?  5   Age Between 16-45 years ?  1 ?  1   History of Preadolescent Sexual Abuse x  3 ?  0   PSYCHOLOGIC DISEASE   ADD, OCD, bipolar, schizophrenia ?  2 ?  2   Depression ? x  1 anxiety ?  1   Scoring Totals 6      Scoring (Risk)  0-3 - Low  4-7 - Moderate  8 - High    Opioid Risk Assessment Score 6/26    "

## 2024-06-20 ENCOUNTER — TELEPHONE (OUTPATIENT)
Dept: OBGYN CLINIC | Age: 59
End: 2024-06-20

## 2024-06-20 DIAGNOSIS — G62.1 ALCOHOLIC PERIPHERAL NEUROPATHY (MULTI): ICD-10-CM

## 2024-06-20 RX ORDER — GABAPENTIN 600 MG/1
600 TABLET, FILM COATED ORAL DAILY
Qty: 30 TABLET | Refills: 3 | Status: SHIPPED | OUTPATIENT
Start: 2024-06-20

## 2024-06-27 ENCOUNTER — PATIENT MESSAGE (OUTPATIENT)
Dept: PRIMARY CARE | Facility: CLINIC | Age: 59
End: 2024-06-27
Payer: MEDICARE

## 2024-07-02 ENCOUNTER — HOSPITAL ENCOUNTER (OUTPATIENT)
Dept: RADIOLOGY | Facility: CLINIC | Age: 59
Discharge: HOME | End: 2024-07-02
Payer: MEDICARE

## 2024-07-02 ENCOUNTER — APPOINTMENT (OUTPATIENT)
Dept: PRIMARY CARE | Facility: CLINIC | Age: 59
End: 2024-07-02
Payer: MEDICARE

## 2024-07-02 DIAGNOSIS — S69.91XA FINGER INJURY, RIGHT, INITIAL ENCOUNTER: ICD-10-CM

## 2024-07-02 PROCEDURE — 73140 X-RAY EXAM OF FINGER(S): CPT | Mod: RT

## 2024-07-02 PROCEDURE — 73140 X-RAY EXAM OF FINGER(S): CPT | Mod: RIGHT SIDE | Performed by: RADIOLOGY

## 2024-07-23 ENCOUNTER — OFFICE VISIT (OUTPATIENT)
Dept: OBGYN CLINIC | Age: 59
End: 2024-07-23
Payer: COMMERCIAL

## 2024-07-23 VITALS
WEIGHT: 150 LBS | DIASTOLIC BLOOD PRESSURE: 76 MMHG | BODY MASS INDEX: 22.73 KG/M2 | SYSTOLIC BLOOD PRESSURE: 120 MMHG | HEIGHT: 68 IN

## 2024-07-23 DIAGNOSIS — Z01.419 ENCOUNTER FOR WELL WOMAN EXAM WITH ROUTINE GYNECOLOGICAL EXAM: Primary | ICD-10-CM

## 2024-07-23 DIAGNOSIS — Z12.31 SCREENING MAMMOGRAM FOR BREAST CANCER: ICD-10-CM

## 2024-07-23 PROCEDURE — 99396 PREV VISIT EST AGE 40-64: CPT | Performed by: OBSTETRICS & GYNECOLOGY

## 2024-07-23 RX ORDER — LEVOTHYROXINE SODIUM 88 UG/1
TABLET ORAL
COMMUNITY
Start: 2024-06-10

## 2024-07-23 RX ORDER — UBIDECARENONE 200 MG
200 CAPSULE ORAL 3 TIMES DAILY
COMMUNITY
Start: 2024-06-19 | End: 2025-06-19

## 2024-07-23 ASSESSMENT — ENCOUNTER SYMPTOMS
ALLERGIC/IMMUNOLOGIC NEGATIVE: 1
NAUSEA: 0
RESPIRATORY NEGATIVE: 1
ABDOMINAL PAIN: 0
RECTAL PAIN: 0
DIARRHEA: 0
BLOOD IN STOOL: 0
VOMITING: 0
EYES NEGATIVE: 1
ANAL BLEEDING: 0
CONSTIPATION: 0
ABDOMINAL DISTENTION: 0

## 2024-07-23 ASSESSMENT — VISUAL ACUITY: OU: 1

## 2024-07-23 NOTE — PROGRESS NOTES
The patient was asked if she would like a chaperone present for her intimate exam. She  Declined the chaperone. Boni Starr CMA (AAMA)    
nipple discharge, skin change or tenderness.   Abdominal:      General: There is no distension.      Palpations: Abdomen is soft. There is no mass.      Tenderness: There is no abdominal tenderness. There is no guarding or rebound.      Hernia: No hernia is present. There is no hernia in the left inguinal area or right inguinal area.   Genitourinary:     General: Normal vulva.      Pubic Area: No rash.       Labia:         Right: No rash, tenderness, lesion or injury.         Left: No rash, tenderness, lesion or injury.       Urethra: No prolapse, urethral swelling or urethral lesion.      Vagina: Normal. No signs of injury and foreign body. No vaginal discharge, erythema, tenderness or bleeding.      Cervix: No cervical motion tenderness, discharge or friability.      Uterus: Not deviated, not enlarged, not fixed and not tender.       Adnexa:         Right: No fullness.          Left: No mass, tenderness or fullness.        Rectum: Normal.   Musculoskeletal:         General: No tenderness. Normal range of motion.      Cervical back: Normal range of motion and neck supple.   Lymphadenopathy:      Cervical: No cervical adenopathy.      Upper Body:      Right upper body: No supraclavicular or axillary adenopathy.      Left upper body: No supraclavicular or axillary adenopathy.      Lower Body: No right inguinal adenopathy. No left inguinal adenopathy.   Skin:     General: Skin is warm and dry.      Coloration: Skin is not pale.      Findings: No erythema or rash.   Neurological:      Mental Status: She is alert and oriented to person, place, and time.   Psychiatric:         Behavior: Behavior normal.         Thought Content: Thought content normal.         Judgment: Judgment normal.         Assessment:       Diagnosis Orders   1. Encounter for well woman exam with routine gynecological exam        2. Screening mammogram for breast cancer  BIN DARCY DIGITAL SCREEN BILATERAL           Plan:      Medications placedthis

## 2024-07-25 RX ORDER — FOLIC ACID 1 MG/1
1000 TABLET ORAL DAILY
Qty: 90 TABLET | Refills: 3 | Status: SHIPPED | OUTPATIENT
Start: 2024-07-25

## 2024-07-29 ENCOUNTER — HOSPITAL ENCOUNTER (OUTPATIENT)
Dept: WOMENS IMAGING | Age: 59
Discharge: HOME | End: 2024-07-31
Attending: OBSTETRICS & GYNECOLOGY
Payer: COMMERCIAL

## 2024-07-29 DIAGNOSIS — Z12.31 SCREENING MAMMOGRAM FOR BREAST CANCER: ICD-10-CM

## 2024-07-29 PROCEDURE — 77063 BREAST TOMOSYNTHESIS BI: CPT

## 2024-08-07 DIAGNOSIS — F43.23 ADJUSTMENT DISORDER WITH MIXED ANXIETY AND DEPRESSED MOOD: Primary | ICD-10-CM

## 2024-08-07 RX ORDER — BUSPIRONE HYDROCHLORIDE 7.5 MG/1
7.5 TABLET ORAL 3 TIMES DAILY PRN
Qty: 60 TABLET | Refills: 2 | Status: SHIPPED | OUTPATIENT
Start: 2024-08-07

## 2024-08-12 ENCOUNTER — TELEPHONE (OUTPATIENT)
Dept: PAIN MEDICINE | Facility: CLINIC | Age: 59
End: 2024-08-12
Payer: MEDICARE

## 2024-08-12 DIAGNOSIS — G62.1 ALCOHOLIC PERIPHERAL NEUROPATHY (MULTI): ICD-10-CM

## 2024-08-12 RX ORDER — VITAMIN B COMPLEX
1 CAPSULE ORAL 2 TIMES DAILY
Qty: 180 CAPSULE | Refills: 3 | Status: SHIPPED | OUTPATIENT
Start: 2024-08-12 | End: 2025-08-12

## 2024-08-12 RX ORDER — PERPHENAZINE 16 MG
TABLET ORAL
Qty: 540 CAPSULE | Refills: 3 | Status: SHIPPED | OUTPATIENT
Start: 2024-08-12

## 2024-08-12 RX ORDER — GABAPENTIN 600 MG/1
600 TABLET, FILM COATED ORAL DAILY
Qty: 90 TABLET | Refills: 3 | Status: SHIPPED | OUTPATIENT
Start: 2024-08-12 | End: 2024-11-10

## 2024-08-12 RX ORDER — ACETAMINOPHEN 160 MG/5ML
200 SUSPENSION, ORAL (FINAL DOSE FORM) ORAL 3 TIMES DAILY
Qty: 270 CAPSULE | Refills: 3 | Status: SHIPPED | OUTPATIENT
Start: 2024-08-12 | End: 2025-08-12

## 2024-08-12 NOTE — TELEPHONE ENCOUNTER
Orders Placed This Encounter      alpha lipoic acid 600 mg capsule      b complex vitamins capsule      coenzyme Q-10 200 mg capsule      gabapentin (Gralise) 600 mg tablet extended release 24 hr

## 2024-08-17 ENCOUNTER — PATIENT MESSAGE (OUTPATIENT)
Dept: PAIN MEDICINE | Facility: CLINIC | Age: 59
End: 2024-08-17
Payer: MEDICARE

## 2024-08-17 DIAGNOSIS — G62.1 ALCOHOLIC PERIPHERAL NEUROPATHY (MULTI): ICD-10-CM

## 2024-08-19 DIAGNOSIS — F43.23 ADJUSTMENT DISORDER WITH MIXED ANXIETY AND DEPRESSED MOOD: ICD-10-CM

## 2024-08-19 RX ORDER — BUSPIRONE HYDROCHLORIDE 7.5 MG/1
7.5 TABLET ORAL 3 TIMES DAILY PRN
Qty: 270 TABLET | Refills: 2 | Status: SHIPPED | OUTPATIENT
Start: 2024-08-19

## 2024-08-20 DIAGNOSIS — G62.1 ALCOHOLIC PERIPHERAL NEUROPATHY (MULTI): ICD-10-CM

## 2024-08-20 RX ORDER — VITAMIN B COMPLEX
1 CAPSULE ORAL 2 TIMES DAILY
Qty: 180 CAPSULE | Refills: 3 | OUTPATIENT
Start: 2024-08-20 | End: 2025-08-20

## 2024-08-20 RX ORDER — VITAMIN B COMPLEX
1 CAPSULE ORAL 2 TIMES DAILY
Qty: 720 CAPSULE | Refills: 0 | Status: SHIPPED | OUTPATIENT
Start: 2024-08-20 | End: 2024-08-21 | Stop reason: SDUPTHER

## 2024-08-21 DIAGNOSIS — G62.1 ALCOHOLIC PERIPHERAL NEUROPATHY (MULTI): ICD-10-CM

## 2024-08-21 RX ORDER — VITAMIN B COMPLEX
1 CAPSULE ORAL 2 TIMES DAILY
Qty: 180 CAPSULE | Refills: 0 | Status: SHIPPED | OUTPATIENT
Start: 2024-08-21 | End: 2024-11-19

## 2024-08-26 RX ORDER — BUPROPION HYDROCHLORIDE 300 MG/1
300 TABLET ORAL EVERY MORNING
Qty: 90 TABLET | Refills: 1 | Status: SHIPPED | OUTPATIENT
Start: 2024-08-26

## 2024-09-17 DIAGNOSIS — E03.9 HYPOTHYROIDISM, UNSPECIFIED TYPE: ICD-10-CM

## 2024-09-17 RX ORDER — LEVOTHYROXINE SODIUM 88 UG/1
88 TABLET ORAL EVERY 24 HOURS
Qty: 90 TABLET | Refills: 0 | Status: SHIPPED | OUTPATIENT
Start: 2024-09-17 | End: 2024-12-16

## 2024-09-17 NOTE — TELEPHONE ENCOUNTER
Pt last OV 2/7/2024    Requested Prescriptions     Pending Prescriptions Disp Refills    levothyroxine (Synthroid, Levoxyl) 88 mcg tablet [Pharmacy Med Name: levothyroxine 88 mcg tablet] 30 tablet 0     Sig: Take 1 tablet (88 mcg) by mouth once every 24 hours.

## 2024-09-23 ENCOUNTER — PATIENT MESSAGE (OUTPATIENT)
Dept: PRIMARY CARE | Facility: CLINIC | Age: 59
End: 2024-09-23
Payer: MEDICARE

## 2024-09-30 NOTE — PROGRESS NOTES
SUBJECTIVE:  This is a pleasant  for the [employer] and her  is a patient of mine on IV infusion 59 y.o.  female with PMH of anxiety and depression on citalopram, hypothyroidism, controlled asthma, alcoholic peripheral neuropathy on gabapentin 100 mg and medical marijuana presenting with bilateral feet burning and neuropathy pain she had an EMG done few years ago and it was positive for peripheral neuropathy and her last visit we switched her to gray lease 600 mg daily since she is very sensitive to gabapentin and place her on neuro supplement and recommended acupuncture who is here for follow-up ***    Patient is candidate for nevro spinal cord stimulator and IV infusion therapy    Prior office visit:  6/19/2024: The patient was referred to us by Referring Provider: Her  who is a patient of mine. this is 59 y.o.  female by her  Tobias who is a patient of ours (IV infusion) with a past history of anxiety and depression on citalopram, hypothyroidism, controlled asthma, alcoholic peripheral neuropathy on gabapentin 100 mg and medical marijuana presenting with bilateral feet burning and neuropathy pain she had an EMG done few years ago and it was positive for peripheral neuropathy.  The patient was on phentermine for her weight loss and that helped significantly with the neuropathy and she decreased the amount of the gabapentin she is taking and after the weight loss doctor would not continue prescribing phentermine for her.  Patient here asking for other solutions.  She takes the 100 mg capsule of gabapentin 2 of them at night during the afternoon and 3 at night.  Assessment  Very pleasant 59 years old with peripheral alcoholic neuropathy OARRS stopped drinking since 2019 and she did very well with phentermine while her weight loss now the patient has a lot of breakthrough pain with her gabapentin 100 mg that she takes 200 mg in the morning 200 in the evening and 300 mg at night.  Patient  very sensitive to gabapentin we will start her on Gralise 600 mg daily hopefully that will help without the need to go for any other medication.  We discussed pregabalin as well.  I like her to increase her alpha lipoic acid and I added the coenzyme Q 10 and super B complex for her therapy.  I recommended also Boris acupuncture.     Procedures:   None  Portions of record reviewed for pertinent issues: active problem list, medication list, allergies, family history, social history, notes from last encounter, encounters, lab results, imaging and other available records.     I have personally reviewed the OARRS report for this patient. This report is scanned into the electronic medical record. I have considered the risks of abuse, dependence, addiction and diversion. It showed: Medical marijuana and gabapentin 100 mg Essence Samuel   OPIOID RISK ASSESSMENT SCORE 6/26  Aberrant behavior: None        Diagnostic studies:  The patient stated that she had an EMG done at [place] and told that she has peripheral neuropathy but we do not have the report        Employment/disability/litigation: Retired  for the government     Social History: , finished law school, quit drinking since 2019 denies smoking she is on medical marijuana        Review of Systems   HENT: Negative.     Eyes: Negative.    Respiratory: Negative.     Cardiovascular: Negative.    Gastrointestinal: Negative.    Endocrine: Negative.    Genitourinary: Negative.    Musculoskeletal:  Positive for myalgias.   Skin: Negative.    Neurological:  Positive for numbness.        Burning in the feet   Hematological: Negative.    Psychiatric/Behavioral: Negative.              Physical Exam  Vitals and nursing note reviewed.   Constitutional:       Appearance: Normal appearance.   HENT:      Head: Normocephalic and atraumatic.      Nose: Nose normal.   Eyes:      Extraocular Movements: Extraocular movements intact.      Conjunctiva/sclera: Conjunctivae  normal.      Pupils: Pupils are equal, round, and reactive to light.   Cardiovascular:      Rate and Rhythm: Normal rate and regular rhythm.      Pulses: Normal pulses.      Heart sounds: Normal heart sounds.   Pulmonary:      Effort: Pulmonary effort is normal.      Breath sounds: Normal breath sounds.   Abdominal:      General: Abdomen is flat. Bowel sounds are normal.      Palpations: Abdomen is soft.   Skin:     General: Skin is warm.   Neurological:      General: No focal deficit present.      Mental Status: She is alert.      Sensory: Sensory deficit present.      Motor: Motor function is intact.      Coordination: Coordination is intact.      Gait: Gait is intact.      Deep Tendon Reflexes: Reflexes abnormal.      Reflex Scores:       Tricep reflexes are 4+ on the right side and 4+ on the left side.       Bicep reflexes are 4+ on the right side and 4+ on the left side.       Brachioradialis reflexes are 4+ on the right side and 4+ on the left side.       Patellar reflexes are 4+ on the right side and 4+ on the left side.       Achilles reflexes are 0 on the right side and 0 on the left side.     Comments: Loss of vibratory sensation in both feet   Psychiatric:         Mood and Affect: Mood normal.         Behavior: Behavior normal.                             Procedures:  *** the patient has had a ***% improvement in pain and function      Portions of record reviewed for pertinent issues: active problem list, medication list, allergies, family history, social history, notes from last encounter, encounters, lab results, imaging and other available records.      I have personally reviewed the OARRS report for this patient. This report is scanned into the electronic medical record. I have considered the risks of abuse, dependence, addiction and diversion. It showed: ***  OPIOID RISK ASSESSMENT SCORE ***/26  Opioid agreement: ***  Activities of daily living: ***  Adverse effects: ***  Analgesia: W/O: ***/10, W  ***/10    Toxicology screen: ***  Aberrant behavior: ***        Review of Systems     Physical Exam                 Plan  At least 50% of the visit was involved in the discussion of the options for treatment. We discussed exercises, medication, interventional therapies and surgery. Healthy life style is essential with patient hard work to achieve the wellness. In addition; discussion with the patient and/or family about any of the diagnostic results, impressions and/or recommended diagnostic studies, prognosis, risks and benefits of treatment options, instructions for treatment and/or follow-up, importance of compliance with chosen treatment options, risk-factor reduction, and patient/family education.         Recommended Pool therapy, walking in the pool, at least 3x per week for 30 minutes  Continue self-directed physical therapy with at least daily exercises for minimum of 20-minute, brochure was handed to the patient  *** Smoking cessation  Healthy lifestyle and anti-inflammatory diet in addition to weight control discussed with the patient  Alternative chronic pain therapies was discussed, encouraged and information was handed  Return to Clinic ***     *Please note this report has been produced using speech recognition software and may contain errors related to that system including grammar, punctuation and spelling as well as words and phrases that may be inappropriate. If there are questions or concerns, please feel free to contact me to clarify.    Lily Moffett MD

## 2024-10-01 NOTE — PROGRESS NOTES
This is 59 y.o.  female with who has been treated for Alcoholic peripheral neuropathy . Pain is {DESC; BETTER/WORSE:29654}, The pain is described as {Pain description:00615} and is relieved by {pain relief:37962} with who is here for follow-up No chief complaint on file.      Pain Therapies: {Prior pain therapy:12146}

## 2024-10-02 ENCOUNTER — APPOINTMENT (OUTPATIENT)
Dept: PAIN MEDICINE | Facility: CLINIC | Age: 59
End: 2024-10-02
Payer: MEDICARE

## 2024-10-04 ENCOUNTER — OFFICE VISIT (OUTPATIENT)
Dept: PODIATRY | Age: 59
End: 2024-10-04
Payer: COMMERCIAL

## 2024-10-04 VITALS — BODY MASS INDEX: 22.28 KG/M2 | WEIGHT: 147 LBS | HEIGHT: 68 IN | TEMPERATURE: 97.2 F

## 2024-10-04 DIAGNOSIS — M20.42 HAMMER TOE OF LEFT FOOT: ICD-10-CM

## 2024-10-04 DIAGNOSIS — M79.675 PAIN IN TOE OF LEFT FOOT: Primary | ICD-10-CM

## 2024-10-04 DIAGNOSIS — G62.9 SMALL FIBER NEUROPATHY: ICD-10-CM

## 2024-10-04 DIAGNOSIS — L60.3 NAIL DYSTROPHY: ICD-10-CM

## 2024-10-04 PROCEDURE — 99213 OFFICE O/P EST LOW 20 MIN: CPT | Performed by: PODIATRIST

## 2024-10-04 ASSESSMENT — ENCOUNTER SYMPTOMS
NAUSEA: 0
VOMITING: 0
BACK PAIN: 0
SHORTNESS OF BREATH: 0

## 2024-10-04 NOTE — PROGRESS NOTES
[facility] [place] [facility], [facility]  [address]  [place] [address]  Dept: [phone]  Loc: [phone]       Mimi Duarte  (1965)    10/4/24    Subjective     Mimi Duarte is 59 y.o. female who complains today of:    Chief Complaint   Patient presents with    Nail Problem     Left 2nd toe       HPI: Patient presents for follow-up for a deformed and discolored left second toe nail.  Patient reports that she has been applying topical antifungal medication biweekly since her last visit.  Patient reports continued thick hard skin under the nail plate, she states this will bleed when she attempts to trim it back.  Patient states she did not purchase the previously recommended hammertoe crest pad.  Patient reports continued neuritic symptoms to the bilateral lower extremity.    Review of Systems   Constitutional:  Negative for chills and fever.   HENT:  Negative for hearing loss.    Respiratory:  Negative for shortness of breath.    Cardiovascular:  Negative for chest pain.   Gastrointestinal:  Negative for nausea and vomiting.   Genitourinary:  Negative for difficulty urinating.   Musculoskeletal:  Negative for back pain and gait problem.   Skin:  Negative for wound.   Neurological:  Positive for numbness.   Hematological:  Does not bruise/bleed easily.   Psychiatric/Behavioral:  Negative for sleep disturbance.          Allergies:  Molds & smuts    Current Outpatient Medications on File Prior to Visit   Medication Sig Dispense Refill    buPROPion (WELLBUTRIN XL) 300 MG extended release tablet Take 1 tablet by mouth every morning 90 tablet 1    busPIRone (BUSPAR) 7.5 MG tablet Take 1 tablet by mouth 3 times daily as needed (anxiety) 270 tablet 2    folic acid (FOLVITE) 1 MG tablet TAKE 1 TABLET DAILY 90 tablet 3    B Complex Vitamins (B COMPLEX 100 PO) Take 1 capsule by mouth 2 times daily      levothyroxine (SYNTHROID) 88 MCG

## 2024-11-14 ENCOUNTER — OFFICE VISIT (OUTPATIENT)
Dept: PRIMARY CARE CLINIC | Age: 59
End: 2024-11-14
Payer: COMMERCIAL

## 2024-11-14 VITALS
HEART RATE: 65 BPM | WEIGHT: 152.6 LBS | HEIGHT: 68 IN | BODY MASS INDEX: 23.13 KG/M2 | RESPIRATION RATE: 18 BRPM | OXYGEN SATURATION: 98 % | DIASTOLIC BLOOD PRESSURE: 82 MMHG | SYSTOLIC BLOOD PRESSURE: 122 MMHG

## 2024-11-14 DIAGNOSIS — F43.23 ADJUSTMENT DISORDER WITH MIXED ANXIETY AND DEPRESSED MOOD: Primary | ICD-10-CM

## 2024-11-14 PROCEDURE — 99213 OFFICE O/P EST LOW 20 MIN: CPT | Performed by: INTERNAL MEDICINE

## 2024-11-14 RX ORDER — GABAPENTIN 600 MG/1
600 TABLET, FILM COATED ORAL
COMMUNITY
Start: 2024-08-12

## 2024-11-14 RX ORDER — VITAMIN B COMPLEX/FOLIC ACID 0.4 MG
1 TABLET ORAL 2 TIMES DAILY
COMMUNITY
Start: 2024-09-21

## 2024-11-14 ASSESSMENT — ENCOUNTER SYMPTOMS
NAUSEA: 0
VOMITING: 0
COUGH: 0
WHEEZING: 0
ABDOMINAL PAIN: 0
SHORTNESS OF BREATH: 0
DIARRHEA: 0

## 2024-11-14 NOTE — PROGRESS NOTES
Subjective:      Patient ID: Mimi Duarte is a 59 y.o. female    Follow up   HPI  Pt presents for follow up regarding management of anxiety and depression. Both no longer  controlled on Wellbutrin 150mg. Not sure to commence 300mg daily. Attests to adequate sleep, energy and appetite. No suicidal ideations or hallucinations.   Past Medical History:   Diagnosis Date    Allergic rhinitis     Allergies     Asthma     Closed dislocation of finger of left hand 2018    Dermoid tumor     Essential hypertension 5/15/2018    Hyperthyroidism     Thyroid disease      Past Surgical History:   Procedure Laterality Date    COLONOSCOPY N/A 2024    COLONOSCOPY WITH POLYPECTOMY performed by Wendy Stewart MD at [facility]    LAPAROSCOPY Right 10/20/2021    LAPARSCOPIC ADNEXAL MASS REMOVAL WITH  RIGHT SALPINGO OOPHORECTOMY; INTRAUTERINE DEVICE REMOVAL performed by Sheeba Pop MD at [facility] OR    LASIK Bilateral 2021    MANDIBLE SURGERY      OVARY REMOVAL Right 10/2021    due to cyst    VENTRAL HERNIA REPAIR N/A 9/15/2022    VENTRAL HERNIA REPAIR WITH MESH performed by Rodri Willson MD at [facility] OR     Social History     Socioeconomic History    Marital status:      Spouse name: Yasir    Number of children: 0    Years of education: 19    Highest education level: Professional school degree (e.g., MD, DDS, DVM, VIVIENNE)   Occupational History    Occupation: retired   Tobacco Use    Smoking status: Former     Current packs/day: 0.00     Average packs/day: 0.3 packs/day for 5.0 years (1.3 ttl pk-yrs)     Types: Cigarettes     Start date: 1985     Quit date: 1990     Years since quittin.3    Smokeless tobacco: Never   Vaping Use    Vaping status: Never Used   Substance and Sexual Activity    Alcohol use: Not Currently    Drug use: Not Currently    Sexual activity: Yes     Partners: Male     Comment:  and monogamous   Other Topics Concern    Not on file   Social History Narrative    Not

## 2024-11-19 DIAGNOSIS — B35.1 ONYCHOMYCOSIS: Primary | ICD-10-CM

## 2024-11-19 DIAGNOSIS — H43.819 VITREOUS DETACHMENT, UNSPECIFIED LATERALITY: ICD-10-CM

## 2024-12-23 RX ORDER — GABAPENTIN 100 MG/1
CAPSULE ORAL
Qty: 630 CAPSULE | Refills: 3 | Status: SHIPPED | OUTPATIENT
Start: 2024-12-23 | End: 2025-03-21

## 2024-12-31 RX ORDER — ESTRADIOL 10 UG/1
INSERT VAGINAL
Qty: 24 TABLET | Refills: 3 | Status: SHIPPED | OUTPATIENT
Start: 2024-12-31

## 2025-01-02 ENCOUNTER — PATIENT MESSAGE (OUTPATIENT)
Dept: PRIMARY CARE CLINIC | Age: 60
End: 2025-01-02

## 2025-01-03 NOTE — TELEPHONE ENCOUNTER
Patient's pharmacy called today reporting the same information the patient has stated. Pharmacist reported that the PA can be initiated by calling [phone].

## 2025-01-13 ENCOUNTER — TELEPHONE (OUTPATIENT)
Dept: PRIMARY CARE CLINIC | Age: 60
End: 2025-01-13

## 2025-01-13 NOTE — TELEPHONE ENCOUNTER
INS AUTH GABAPENTIN  APPROVAL  12/10/24 - 1/10/26  AUTH #[id number]    PATIENT HAS BEEN NOTIFIED VIA MY CHART    SEE ATTACHED APPROVAL LETTER ATTACHED BELOW

## 2025-01-29 ENCOUNTER — APPOINTMENT (OUTPATIENT)
Dept: OPHTHALMOLOGY | Facility: CLINIC | Age: 60
End: 2025-01-29
Payer: MEDICARE

## 2025-01-29 ENCOUNTER — PREP FOR PROCEDURE (OUTPATIENT)
Dept: OPHTHALMOLOGY | Facility: CLINIC | Age: 60
End: 2025-01-29

## 2025-01-29 DIAGNOSIS — H43.812 POSTERIOR VITREOUS DETACHMENT OF LEFT EYE: Primary | ICD-10-CM

## 2025-01-29 DIAGNOSIS — H43.813 POSTERIOR VITREOUS DETACHMENT OF BOTH EYES: Primary | ICD-10-CM

## 2025-01-29 DIAGNOSIS — H43.399 VITREOUS OPACITIES: ICD-10-CM

## 2025-01-29 DIAGNOSIS — H52.13 MYOPIA OF BOTH EYES: ICD-10-CM

## 2025-01-29 PROCEDURE — 99204 OFFICE O/P NEW MOD 45 MIN: CPT | Performed by: OPHTHALMOLOGY

## 2025-01-29 RX ORDER — TROPICAMIDE 10 MG/ML
1 SOLUTION/ DROPS OPHTHALMIC
OUTPATIENT
Start: 2025-02-25 | End: 2025-02-25

## 2025-01-29 RX ORDER — TETRACAINE HYDROCHLORIDE 5 MG/ML
1 SOLUTION OPHTHALMIC ONCE
OUTPATIENT
Start: 2025-02-25

## 2025-01-29 RX ORDER — PHENYLEPHRINE HYDROCHLORIDE 25 MG/ML
1 SOLUTION/ DROPS OPHTHALMIC
OUTPATIENT
Start: 2025-02-25 | End: 2025-02-25

## 2025-01-29 ASSESSMENT — CONF VISUAL FIELD
OD_NORMAL: 1
OS_INFERIOR_TEMPORAL_RESTRICTION: 0
OS_SUPERIOR_NASAL_RESTRICTION: 0
OD_SUPERIOR_TEMPORAL_RESTRICTION: 0
OS_SUPERIOR_TEMPORAL_RESTRICTION: 0
OS_NORMAL: 1
OD_INFERIOR_NASAL_RESTRICTION: 0
OS_INFERIOR_NASAL_RESTRICTION: 0
OD_SUPERIOR_NASAL_RESTRICTION: 0
OD_INFERIOR_TEMPORAL_RESTRICTION: 0

## 2025-01-29 ASSESSMENT — ENCOUNTER SYMPTOMS
EYES NEGATIVE: 1
ENDOCRINE NEGATIVE: 0
CONSTITUTIONAL NEGATIVE: 0
RESPIRATORY NEGATIVE: 0
ALLERGIC/IMMUNOLOGIC NEGATIVE: 0
MUSCULOSKELETAL NEGATIVE: 0
CARDIOVASCULAR NEGATIVE: 0
GASTROINTESTINAL NEGATIVE: 0
HEMATOLOGIC/LYMPHATIC NEGATIVE: 0
NEUROLOGICAL NEGATIVE: 0
PSYCHIATRIC NEGATIVE: 0

## 2025-01-29 ASSESSMENT — SLIT LAMP EXAM - LIDS
COMMENTS: NORMAL
COMMENTS: NORMAL

## 2025-01-29 ASSESSMENT — TONOMETRY
IOP_METHOD: GOLDMANN APPLANATION
OD_IOP_MMHG: 12
OS_IOP_MMHG: 12

## 2025-01-29 ASSESSMENT — VISUAL ACUITY
METHOD: SNELLEN - LINEAR
OS_SC: 20/20
OD_SC: 20/20

## 2025-01-29 ASSESSMENT — EXTERNAL EXAM - RIGHT EYE: OD_EXAM: NORMAL

## 2025-01-29 ASSESSMENT — EXTERNAL EXAM - LEFT EYE: OS_EXAM: NORMAL

## 2025-01-29 NOTE — PROGRESS NOTES
"Assessment/Plan   Diagnoses and all orders for this visit:  Posterior vitreous detachment of both eyes  -     OCT, Retina - OU - Both Eyes  Myopia of both eyes      New patient here dilated exam, diagnosed with \"vitreous detachment in OS\" in 08/2024 and patient has had symptomatic floaters since.    She denies any flashes/curtains    Today there is PVD with no evidence of tears on DFE    Patient reports symptoms OS for greater than 6 months. Patient    R/b of surgery discussed    The risks and benefits of vitreoretinal surgery was explained clearly.Risks include loss of vision even permanently. Infections, discomfort form sutures and hemorrhage as well as retinal detachment were explained. The biggest risk of  surgery failing in a retinal detachment are due to challenges in surgery or late scar formation described as proliferative vitreoretinopathy. In a membrane peel there can be  along period for recovery of visual acuity, in more long standing membrane,s takes longer to resolve retinal abnormalities    Will plan for pars plana vitrectomy (PPV) OS  Case request sent for lizeth 2/25/25  "

## 2025-02-03 ENCOUNTER — APPOINTMENT (OUTPATIENT)
Dept: PRIMARY CARE | Facility: CLINIC | Age: 60
End: 2025-02-03
Payer: MEDICARE

## 2025-02-04 RX ORDER — BUPROPION HYDROCHLORIDE 300 MG/1
TABLET ORAL
Qty: 90 TABLET | Refills: 3 | Status: SHIPPED | OUTPATIENT
Start: 2025-02-04

## 2025-02-04 NOTE — TELEPHONE ENCOUNTER
Rx request   Requested Prescriptions     Pending Prescriptions Disp Refills    buPROPion (WELLBUTRIN XL) 300 MG extended release tablet [Pharmacy Med Name: BUPROPION HCL XL TABS 300MG] 90 tablet 3     Sig: TAKE 1 TABLET EVERY MORNING (DISCONTINUE 150 MG DAILY)     LOV 11/14/2024  Next Visit Date:  Future Appointments   Date Time Provider Department Center   4/18/2025  8:15 AM Damien Sena MD [facility] -   7/28/2025 10:00 AM Sherif, Yahaira A, MD [facility]

## 2025-02-11 ENCOUNTER — TELEPHONE (OUTPATIENT)
Dept: PAIN MEDICINE | Facility: CLINIC | Age: 60
End: 2025-02-11
Payer: MEDICARE

## 2025-02-11 DIAGNOSIS — G89.4 CHRONIC PAIN SYNDROME: Primary | ICD-10-CM

## 2025-02-11 RX ORDER — VITAMIN B COMPLEX
1 CAPSULE ORAL 2 TIMES DAILY
Qty: 60 CAPSULE | Refills: 11 | Status: SHIPPED | OUTPATIENT
Start: 2025-02-11 | End: 2026-02-11

## 2025-03-03 NOTE — PROGRESS NOTES
SUBJECTIVE:  This is 60 y.o.  female with PMH of anxiety and depression on citalopram, hypothyroidism, controlled asthma, alcoholic peripheral neuropathy on gabapentin 100 mg and medical marijuana was treated with Gralise 600 mg and neuro supplement who is here for follow-up ***    Consider opioid sparing infusion the same thing with her  and for her could be nevro spinal cord stimulator    Prior office visit:  6/19/2024: The patient was referred to us by Referring Provider: Her  who is a patient of mine. this is 59 y.o.  female by her  Tobias who is a patient of ours (IV infusion) with a past history of anxiety and depression on citalopram, hypothyroidism, controlled asthma, alcoholic peripheral neuropathy on gabapentin 100 mg and medical marijuana presenting with bilateral feet burning and neuropathy pain she had an EMG done few years ago and it was positive for peripheral neuropathy.  The patient was on phentermine for her weight loss and that helped significantly with the neuropathy and she decreased the amount of the gabapentin she is taking and after the weight loss doctor would not continue prescribing phentermine for her.  Patient here asking for other solutions.  She takes the 100 mg capsule of gabapentin 2 of them at night during the afternoon and 3 at night.  Assessment  Very pleasant 59 years old with peripheral alcoholic neuropathy, stopped drinking since 2019 and she did very well with phentermine for her weight loss now the patient has a lot of breakthrough pain with her gabapentin 100 mg that she takes 200 mg in the morning 200 in the evening and 300 mg at night.  Patient very sensitive to gabapentin we will start her on Gralise 600 mg daily hopefully that will help without the need to go for any other medication.  We discussed pregabalin as well.  I like her to increase her alpha lipoic acid and I added the coenzyme Q 10 and super B complex for her therapy.  I recommended also  Boris acupuncture.         Procedures:   None  Portions of record reviewed for pertinent issues: active problem list, medication list, allergies, family history, social history, notes from last encounter, encounters, lab results, imaging and other available records.     I have personally reviewed the OARRS report for this patient. This report is scanned into the electronic medical record. I have considered the risks of abuse, dependence, addiction and diversion. It showed: Medical marijuana and gabapentin 100 mg Essence Samuel   OPIOID RISK ASSESSMENT SCORE 6/26  Aberrant behavior: None        Diagnostic studies:  The patient stated that she had an EMG done at [facility] and told that she has peripheral neuropathy but we do not have the report        Employment/disability/litigation: Retired  for the [employer]     Social History: , finished law school, quit drinking since 2019 denies smoking she is on medical marijuana        Review of Systems   HENT: Negative.     Eyes: Negative.    Respiratory: Negative.     Cardiovascular: Negative.    Gastrointestinal: Negative.    Endocrine: Negative.    Genitourinary: Negative.    Musculoskeletal:  Positive for myalgias.   Skin: Negative.    Neurological:  Positive for numbness.        Burning in the feet   Hematological: Negative.    Psychiatric/Behavioral: Negative.              Physical Exam  Vitals and nursing note reviewed.   Constitutional:       Appearance: Normal appearance.   HENT:      Head: Normocephalic and atraumatic.      Nose: Nose normal.   Eyes:      Extraocular Movements: Extraocular movements intact.      Conjunctiva/sclera: Conjunctivae normal.      Pupils: Pupils are equal, round, and reactive to light.   Cardiovascular:      Rate and Rhythm: Normal rate and regular rhythm.      Pulses: Normal pulses.      Heart sounds: Normal heart sounds.   Pulmonary:      Effort: Pulmonary effort is normal.      Breath sounds: Normal breath sounds.    Abdominal:      General: Abdomen is flat. Bowel sounds are normal.      Palpations: Abdomen is soft.   Skin:     General: Skin is warm.   Neurological:      General: No focal deficit present.      Mental Status: She is alert.      Sensory: Sensory deficit present.      Motor: Motor function is intact.      Coordination: Coordination is intact.      Gait: Gait is intact.      Deep Tendon Reflexes: Reflexes abnormal.      Reflex Scores:       Tricep reflexes are 4+ on the right side and 4+ on the left side.       Bicep reflexes are 4+ on the right side and 4+ on the left side.       Brachioradialis reflexes are 4+ on the right side and 4+ on the left side.       Patellar reflexes are 4+ on the right side and 4+ on the left side.       Achilles reflexes are 0 on the right side and 0 on the left side.     Comments: Loss of vibratory sensation in both feet   Psychiatric:         Mood and Affect: Mood normal.         Behavior: Behavior normal.                          Procedures:  *** the patient has had a ***% improvement in pain and function      Portions of record reviewed for pertinent issues: active problem list, medication list, allergies, family history, social history, notes from last encounter, encounters, lab results, imaging and other available records.      I have personally reviewed the OARRS report for this patient. This report is scanned into the electronic medical record. I have considered the risks of abuse, dependence, addiction and diversion. It showed: ***  OPIOID RISK ASSESSMENT SCORE ***/26  Opioid agreement: ***  Activities of daily living: ***  Adverse effects: ***  Analgesia: W/O: ***/10, W ***/10    Toxicology screen: ***  Aberrant behavior: ***  My patient has no underlying substance abuse or alcohol abuse and there's no mental health conditions contributing to the patient's pain.      Review of Systems     Physical Exam                 Plan  At least 50% of the visit was involved in the  discussion of the options for treatment. We discussed exercises, medication, interventional therapies and surgery. Healthy life style is essential with patient hard work to achieve the wellness. In addition; discussion with the patient and/or family about any of the diagnostic results, impressions and/or recommended diagnostic studies, prognosis, risks and benefits of treatment options, instructions for treatment and/or follow-up, importance of compliance with chosen treatment options, risk-factor reduction, and patient/family education.         Recommended Pool therapy, walking in the pool, at least 3x per week for 30 minutes  Continue self-directed physical therapy with at least daily exercises for minimum of 20-minute, brochure was handed to the patient  *** Smoking cessation  Healthy lifestyle and anti-inflammatory diet in addition to weight control discussed with the patient  Alternative chronic pain therapies was discussed, encouraged and information was handed  Return to Clinic ***     *Please note this report has been produced using speech recognition software and may contain errors related to that system including grammar, punctuation and spelling as well as words and phrases that may be inappropriate. If there are questions or concerns, please feel free to contact me to clarify.    Lily Moffett MD

## 2025-03-05 ENCOUNTER — APPOINTMENT (OUTPATIENT)
Dept: PAIN MEDICINE | Facility: CLINIC | Age: 60
End: 2025-03-05
Payer: MEDICARE

## 2025-04-30 DIAGNOSIS — E03.9 ACQUIRED HYPOTHYROIDISM: Primary | ICD-10-CM

## 2025-05-02 ENCOUNTER — HOSPITAL ENCOUNTER (OUTPATIENT)
Dept: LAB | Age: 60
Discharge: HOME | End: 2025-05-02
Payer: COMMERCIAL

## 2025-05-02 DIAGNOSIS — E03.9 ACQUIRED HYPOTHYROIDISM: ICD-10-CM

## 2025-05-02 LAB — TSH REFLEX: 1.84 UIU/ML (ref 0.44–3.86)

## 2025-05-02 PROCEDURE — 84443 ASSAY THYROID STIM HORMONE: CPT

## 2025-05-02 PROCEDURE — 36415 COLL VENOUS BLD VENIPUNCTURE: CPT

## 2025-05-04 ENCOUNTER — RESULTS FOLLOW-UP (OUTPATIENT)
Dept: PRIMARY CARE CLINIC | Age: 60
End: 2025-05-04

## 2025-05-04 RX ORDER — LEVOTHYROXINE SODIUM 88 UG/1
88 TABLET ORAL DAILY
Qty: 90 TABLET | Refills: 3 | Status: SHIPPED | OUTPATIENT
Start: 2025-05-04 | End: 2025-05-06 | Stop reason: SDUPTHER

## 2025-05-06 RX ORDER — LEVOTHYROXINE SODIUM 88 UG/1
88 TABLET ORAL DAILY
Qty: 90 TABLET | Refills: 3 | Status: SHIPPED | OUTPATIENT
Start: 2025-05-06

## 2025-05-19 DIAGNOSIS — F43.23 ADJUSTMENT DISORDER WITH MIXED ANXIETY AND DEPRESSED MOOD: ICD-10-CM

## 2025-05-19 RX ORDER — BUSPIRONE HYDROCHLORIDE 7.5 MG/1
TABLET ORAL
Qty: 270 TABLET | Refills: 3 | Status: SHIPPED | OUTPATIENT
Start: 2025-05-19

## 2025-05-19 NOTE — TELEPHONE ENCOUNTER
Comments:     Last Office Visit (last PCP visit):   11/14/2024    Next Visit Date:  Future Appointments   Date Time Provider Department Center   7/28/2025 10:30 AM Sheeba Pop MD [facility]       **If hasn't been seen in over a year OR hasn't followed up according to last diabetes/ADHD visit, make appointment for patient before sending refill to provider.    Rx requested:  Requested Prescriptions     Pending Prescriptions Disp Refills    busPIRone (BUSPAR) 7.5 MG tablet [Pharmacy Med Name: BUSPIRONE HCL TABS 7.5MG] 270 tablet 3     Sig: TAKE 1 TABLET THREE TIMES A DAY AS NEEDED FOR ANXIETY (DISCONTINUE 5 MG TABLET)

## 2025-06-15 DIAGNOSIS — L98.8 PERIORAL WRINKLES: ICD-10-CM

## 2025-06-16 RX ORDER — TRETINOIN 0.25 MG/G
CREAM TOPICAL
Qty: 1 EACH | Refills: 1 | Status: SHIPPED | OUTPATIENT
Start: 2025-06-16

## 2025-06-16 RX ORDER — LORATADINE 10 MG/1
10 TABLET ORAL DAILY
Qty: 90 TABLET | Refills: 3 | Status: SHIPPED | OUTPATIENT
Start: 2025-06-16

## 2025-06-25 RX ORDER — LORATADINE 10 MG/1
10 TABLET ORAL DAILY
Qty: 90 TABLET | Refills: 3 | OUTPATIENT
Start: 2025-06-25

## 2025-06-30 DIAGNOSIS — J45.30 MILD PERSISTENT ASTHMA WITHOUT COMPLICATION: ICD-10-CM

## 2025-06-30 RX ORDER — MONTELUKAST SODIUM 10 MG/1
10 TABLET ORAL NIGHTLY
Qty: 90 TABLET | Refills: 3 | Status: SHIPPED | OUTPATIENT
Start: 2025-06-30

## 2025-07-06 NOTE — PROGRESS NOTES
Therapy                            Cancellation/No-show Note    Date: 2022  Patient Name: Rosa Elena Duarte    : 1965  (56 y.o.)     MRN: [id number]    Account #: [de-identified]       Canceled Appointment: 5    Comments: For today's appointment patient:  [x]  Cancelled  []  Rescheduled appointment  []  No-show   []  Called pt to remind of next appointment     Reason given by patient:  []  Patient ill  []  Conflicting appointment  []  No transportation    []  Conflict with work  [x]  No reason given  []  Other:      [] Pt has future appointments scheduled, no follow up needed  [] Pt requests to be on hold. Reason:   If > 2 weeks please discuss with therapist.  [] Therapist to call pt for follow up     Signature:  NARAYAN Corbett 2022 11:47 AM English

## 2025-07-08 DIAGNOSIS — Z12.31 ENCOUNTER FOR SCREENING MAMMOGRAM FOR BREAST CANCER: ICD-10-CM

## 2025-08-01 DIAGNOSIS — J45.30 MILD PERSISTENT ASTHMA WITHOUT COMPLICATION: ICD-10-CM

## 2025-08-01 RX ORDER — ALBUTEROL SULFATE 90 UG/1
2 INHALANT RESPIRATORY (INHALATION) EVERY 4 HOURS PRN
Qty: 1 EACH | Refills: 5 | Status: SHIPPED | OUTPATIENT
Start: 2025-08-01

## 2025-08-08 ENCOUNTER — OFFICE VISIT (OUTPATIENT)
Dept: PRIMARY CARE CLINIC | Age: 60
End: 2025-08-08
Payer: COMMERCIAL

## 2025-08-08 VITALS
TEMPERATURE: 98.8 F | HEIGHT: 68 IN | SYSTOLIC BLOOD PRESSURE: 134 MMHG | OXYGEN SATURATION: 99 % | WEIGHT: 153 LBS | BODY MASS INDEX: 23.19 KG/M2 | HEART RATE: 70 BPM | DIASTOLIC BLOOD PRESSURE: 82 MMHG

## 2025-08-08 DIAGNOSIS — J01.00 ACUTE MAXILLARY SINUSITIS, RECURRENCE NOT SPECIFIED: ICD-10-CM

## 2025-08-08 DIAGNOSIS — J45.21 EXACERBATION OF INTERMITTENT ASTHMA, UNSPECIFIED ASTHMA SEVERITY: Primary | ICD-10-CM

## 2025-08-08 PROCEDURE — 99214 OFFICE O/P EST MOD 30 MIN: CPT | Performed by: INTERNAL MEDICINE

## 2025-08-08 RX ORDER — FLUTICASONE PROPIONATE AND SALMETEROL 250; 50 UG/1; UG/1
1 POWDER RESPIRATORY (INHALATION) EVERY 12 HOURS
Qty: 60 EACH | Refills: 3 | Status: CANCELLED | OUTPATIENT
Start: 2025-08-08

## 2025-08-08 RX ORDER — FLUTICASONE PROPIONATE 50 MCG
2 SPRAY, SUSPENSION (ML) NASAL DAILY
Qty: 16 G | Refills: 3 | Status: SHIPPED | OUTPATIENT
Start: 2025-08-08

## 2025-08-08 RX ORDER — PREDNISONE 20 MG/1
40 TABLET ORAL DAILY
Qty: 14 TABLET | Refills: 0 | Status: SHIPPED | OUTPATIENT
Start: 2025-08-08 | End: 2025-08-15

## 2025-08-08 RX ORDER — CODEINE PHOSPHATE AND GUAIFENESIN 10; 100 MG/5ML; MG/5ML
10 SOLUTION ORAL 2 TIMES DAILY PRN
Qty: 118 ML | Refills: 0 | Status: SHIPPED | OUTPATIENT
Start: 2025-08-08 | End: 2025-08-15

## 2025-08-08 SDOH — ECONOMIC STABILITY: FOOD INSECURITY: WITHIN THE PAST 12 MONTHS, YOU WORRIED THAT YOUR FOOD WOULD RUN OUT BEFORE YOU GOT MONEY TO BUY MORE.: NEVER TRUE

## 2025-08-08 SDOH — ECONOMIC STABILITY: FOOD INSECURITY: WITHIN THE PAST 12 MONTHS, THE FOOD YOU BOUGHT JUST DIDN'T LAST AND YOU DIDN'T HAVE MONEY TO GET MORE.: NEVER TRUE

## 2025-08-08 ASSESSMENT — PATIENT HEALTH QUESTIONNAIRE - PHQ9
1. LITTLE INTEREST OR PLEASURE IN DOING THINGS: NOT AT ALL
SUM OF ALL RESPONSES TO PHQ QUESTIONS 1-9: 0
2. FEELING DOWN, DEPRESSED OR HOPELESS: NOT AT ALL
SUM OF ALL RESPONSES TO PHQ QUESTIONS 1-9: 0

## 2025-08-08 ASSESSMENT — ENCOUNTER SYMPTOMS
SORE THROAT: 0
SINUS PAIN: 1
COUGH: 1
SHORTNESS OF BREATH: 1
SINUS PRESSURE: 1
WHEEZING: 0

## 2025-08-16 DIAGNOSIS — J45.21 EXACERBATION OF INTERMITTENT ASTHMA, UNSPECIFIED ASTHMA SEVERITY: ICD-10-CM

## 2025-08-16 RX ORDER — CODEINE PHOSPHATE AND GUAIFENESIN 10; 100 MG/5ML; MG/5ML
10 SOLUTION ORAL 2 TIMES DAILY PRN
Qty: 118 ML | Refills: 0 | Status: SHIPPED | OUTPATIENT
Start: 2025-08-16 | End: 2025-08-23

## 2025-09-04 ENCOUNTER — HOSPITAL ENCOUNTER (OUTPATIENT)
Dept: WOMENS IMAGING | Age: 60
Discharge: HOME | End: 2025-09-06
Payer: COMMERCIAL

## 2025-09-04 DIAGNOSIS — Z12.31 SCREENING MAMMOGRAM FOR BREAST CANCER: ICD-10-CM

## 2025-09-04 PROCEDURE — 77063 BREAST TOMOSYNTHESIS BI: CPT

## (undated) DEVICE — MANIPULATOR UTER INSTRUMENT ZUMI

## (undated) DEVICE — APPLICATOR MEDICATED 26 CC SOLUTION HI LT ORNG CHLORAPREP

## (undated) DEVICE — GOWN,AURORA,NONREINFORCED,LARGE: Brand: MEDLINE

## (undated) DEVICE — TUBING, SUCTION, 1/4" X 10', STRAIGHT: Brand: MEDLINE

## (undated) DEVICE — COUNTER NDL 40 COUNT HLD 70 FOAM BLK ADH W/ MAG

## (undated) DEVICE — COVER,TABLE,44X90,STERILE: Brand: MEDLINE

## (undated) DEVICE — MARKER SURG SKIN GENTIAN VLT REG TIP W/ 6IN RUL

## (undated) DEVICE — TRAP POLYP BALEEN

## (undated) DEVICE — DEVICE TRCR 12X9X3IN WHT CLSR DISP OMNICLOSE

## (undated) DEVICE — GAUZE,SPONGE,FLUFF,6"X6.75",STRL,10/TRAY: Brand: MEDLINE

## (undated) DEVICE — TOWEL,OR,DSP,ST,BLUE,STD,4/PK,20PK/CS: Brand: MEDLINE

## (undated) DEVICE — BRUSH ENDO CLN L90.5IN SHTH DIA1.7MM BRIST DIA5-7MM 2-6MM

## (undated) DEVICE — SINGLE PORT MANIFOLD: Brand: NEPTUNE 2

## (undated) DEVICE — SPONGE,LAP,18"X18",DLX,XR,ST,5/PK,40/PK: Brand: MEDLINE

## (undated) DEVICE — POUCH, INSTRUMENT, 3POCKET, INVISISHIELD: Brand: MEDLINE

## (undated) DEVICE — SNARE ENDOSCP AD L240CM LOOP W10MM SHTH DIA2.4MM RND INSUL

## (undated) DEVICE — Device

## (undated) DEVICE — GLOVE SURG SZ 9 THK91MIL LTX FREE SYN POLYISOPRENE ANTI

## (undated) DEVICE — Device: Brand: ENDO SMARTCAP

## (undated) DEVICE — SUTURE MCRYL SZ 4-0 L27IN ABSRB UD L19MM PS-2 1/2 CIR PRIM Y426H

## (undated) DEVICE — SUTURE ABSORBABLE BRAIDED 0 CT-1 8X27 IN UD VICRYL JJ41G

## (undated) DEVICE — COVER LT HNDL BLU PLAS

## (undated) DEVICE — PATIENT RETURN ELECTRODE, SINGLE-USE, NON CONTACT QUALITY MONITORING, ADULT, WITH 9 FT (2.7 M) CORD, FOR PATIENTS WEIGHING OVER 33LBS. (15KG): Brand: MEGADYNE

## (undated) DEVICE — Z DUPLICATE USE 2431315 SET INSUF TBNG HI FLO W/ SMK EVAC FOR PNEUMOCLEAR

## (undated) DEVICE — SYRINGE IRRIG 60ML SFT PLIABLE BLB EZ TO GRP 1 HND USE W/

## (undated) DEVICE — SUTURE VCRL SZ 0 L36IN ABSRB UD L36MM CT-1 1/2 CIR J946H

## (undated) DEVICE — GAUZE,SPONGE,4"X4",16PLY,XRAY,STRL,LF: Brand: MEDLINE

## (undated) DEVICE — TISSUE RETRIEVAL SYSTEM: Brand: INZII RETRIEVAL SYSTEM

## (undated) DEVICE — YANKAUER,BULB TIP,W/O VENT,RIGID,STERILE: Brand: MEDLINE

## (undated) DEVICE — LABEL MED MINI W/ MARKER

## (undated) DEVICE — SUTURE PROL SZ 0 L30IN NONABSORBABLE BLU L36MM CT-1 1/2 CIR 8424H

## (undated) DEVICE — GOWN,AURORA,NONRNF,XL,30/CS: Brand: MEDLINE

## (undated) DEVICE — DRAPE, LAVH, STERILE: Brand: MEDLINE

## (undated) DEVICE — ELECTRODE PT RET AD L9FT HI MOIST COND ADH HYDRGEL CORDED

## (undated) DEVICE — TROCAR: Brand: KII® SLEEVE

## (undated) DEVICE — PACK,BASIC IV,SIRUS: Brand: MEDLINE

## (undated) DEVICE — TUBE SET 96 MM 64 MM H2O PERISTALTIC STD AUX CHANNEL

## (undated) DEVICE — TROCAR: Brand: KII FIOS FIRST ENTRY

## (undated) DEVICE — TOTAL TRAY, DB, 100% SILI FOLEY, 16FR 10: Brand: MEDLINE

## (undated) DEVICE — KIT,ANTI FOG,W/SPONGE & FLUID,SOFT PACK: Brand: MEDLINE

## (undated) DEVICE — GLOVE SURG SZ 85 L12IN FNGR THK94MIL TRNSLUC YEL LTX

## (undated) DEVICE — PACK,LAPAROTOMY,NO GOWNS: Brand: MEDLINE

## (undated) DEVICE — TRAY PREP DRY W/ PREM GLV 2 APPL 6 SPNG 2 UNDPD 1 OVERWRAP

## (undated) DEVICE — ENDO CARRY-ON PROCEDURE KIT: Brand: ENDO CARRY-ON PROCEDURE KIT

## (undated) DEVICE — WARMER SCP 2 ANTIFOG LAP DISP

## (undated) DEVICE — NEEDLE INSUF L120MM ULT VERES ENDOPATH

## (undated) DEVICE — LINER PAD CONTOUR SUPER PEACH 7X14IN

## (undated) DEVICE — NEPTUNE E-SEP SMOKE EVACUATION PENCIL, COATED, 70MM BLADE, PUSH BUTTON SWITCH: Brand: NEPTUNE E-SEP

## (undated) DEVICE — ADHESIVE SKIN CLSR 0.7ML TOP DERMBND ADV

## (undated) DEVICE — INTENDED FOR TISSUE SEPARATION, AND OTHER PROCEDURES THAT REQUIRE A SHARP SURGICAL BLADE TO PUNCTURE OR CUT.: Brand: BARD-PARKER ® CARBON RIB-BACK BLADES

## (undated) DEVICE — DRAPE EQUIP TRNSPRT CONTAINMENT FOR BK TAB

## (undated) DEVICE — GLOVE ORANGE PI 7 1/2   MSG9075